# Patient Record
Sex: FEMALE | Race: WHITE | NOT HISPANIC OR LATINO
[De-identification: names, ages, dates, MRNs, and addresses within clinical notes are randomized per-mention and may not be internally consistent; named-entity substitution may affect disease eponyms.]

---

## 2017-04-22 ENCOUNTER — APPOINTMENT (OUTPATIENT)
Dept: FAMILY MEDICINE | Facility: CLINIC | Age: 57
End: 2017-04-22

## 2017-04-22 VITALS
TEMPERATURE: 97.8 F | HEART RATE: 80 BPM | HEIGHT: 67 IN | SYSTOLIC BLOOD PRESSURE: 120 MMHG | WEIGHT: 139 LBS | OXYGEN SATURATION: 98 % | BODY MASS INDEX: 21.82 KG/M2 | DIASTOLIC BLOOD PRESSURE: 70 MMHG | RESPIRATION RATE: 14 BRPM

## 2017-04-23 ENCOUNTER — FORM ENCOUNTER (OUTPATIENT)
Age: 57
End: 2017-04-23

## 2017-04-24 ENCOUNTER — APPOINTMENT (OUTPATIENT)
Dept: ULTRASOUND IMAGING | Facility: CLINIC | Age: 57
End: 2017-04-24

## 2017-04-24 ENCOUNTER — OUTPATIENT (OUTPATIENT)
Dept: OUTPATIENT SERVICES | Facility: HOSPITAL | Age: 57
LOS: 1 days | End: 2017-04-24
Payer: COMMERCIAL

## 2017-04-24 DIAGNOSIS — Z00.8 ENCOUNTER FOR OTHER GENERAL EXAMINATION: ICD-10-CM

## 2017-04-24 PROCEDURE — 76700 US EXAM ABDOM COMPLETE: CPT

## 2017-04-24 PROCEDURE — 76856 US EXAM PELVIC COMPLETE: CPT

## 2017-04-25 LAB
ALBUMIN SERPL ELPH-MCNC: 4.6 G/DL
ALP BLD-CCNC: 60 U/L
ALT SERPL-CCNC: 11 U/L
ANION GAP SERPL CALC-SCNC: 12 MMOL/L
APPEARANCE: CLEAR
AST SERPL-CCNC: 14 U/L
BASOPHILS # BLD AUTO: 0.04 K/UL
BASOPHILS NFR BLD AUTO: 0.6 %
BILIRUB SERPL-MCNC: 0.3 MG/DL
BILIRUBIN URINE: NEGATIVE
BLOOD URINE: NEGATIVE
BUN SERPL-MCNC: 16 MG/DL
CALCIUM SERPL-MCNC: 9.9 MG/DL
CHLORIDE SERPL-SCNC: 104 MMOL/L
CO2 SERPL-SCNC: 24 MMOL/L
COLOR: YELLOW
CREAT SERPL-MCNC: 0.7 MG/DL
EOSINOPHIL # BLD AUTO: 0.07 K/UL
EOSINOPHIL NFR BLD AUTO: 1 %
GLUCOSE QUALITATIVE U: NORMAL MG/DL
GLUCOSE SERPL-MCNC: 98 MG/DL
HBA1C MFR BLD HPLC: 5.3 %
HCT VFR BLD CALC: 42 %
HGB BLD-MCNC: 13.5 G/DL
IMM GRANULOCYTES NFR BLD AUTO: 0.3 %
KETONES URINE: NEGATIVE
LEUKOCYTE ESTERASE URINE: NEGATIVE
LYMPHOCYTES # BLD AUTO: 1.75 K/UL
LYMPHOCYTES NFR BLD AUTO: 25.2 %
MAN DIFF?: NORMAL
MCHC RBC-ENTMCNC: 30.3 PG
MCHC RBC-ENTMCNC: 32.1 GM/DL
MCV RBC AUTO: 94.4 FL
MONOCYTES # BLD AUTO: 0.51 K/UL
MONOCYTES NFR BLD AUTO: 7.3 %
NEUTROPHILS # BLD AUTO: 4.56 K/UL
NEUTROPHILS NFR BLD AUTO: 65.6 %
NITRITE URINE: NEGATIVE
PH URINE: 7
PLATELET # BLD AUTO: 446 K/UL
POTASSIUM SERPL-SCNC: 4.9 MMOL/L
PROT SERPL-MCNC: 7 G/DL
PROTEIN URINE: NEGATIVE MG/DL
RBC # BLD: 4.45 M/UL
RBC # FLD: 13.5 %
SODIUM SERPL-SCNC: 140 MMOL/L
SPECIFIC GRAVITY URINE: 1
TSH SERPL-ACNC: 1.16 UIU/ML
UROBILINOGEN URINE: NORMAL MG/DL
WBC # FLD AUTO: 6.95 K/UL

## 2017-05-15 ENCOUNTER — RX RENEWAL (OUTPATIENT)
Age: 57
End: 2017-05-15

## 2017-05-16 ENCOUNTER — TRANSCRIPTION ENCOUNTER (OUTPATIENT)
Age: 57
End: 2017-05-16

## 2017-08-02 ENCOUNTER — APPOINTMENT (OUTPATIENT)
Dept: FAMILY MEDICINE | Facility: CLINIC | Age: 57
End: 2017-08-02
Payer: COMMERCIAL

## 2017-08-02 VITALS
SYSTOLIC BLOOD PRESSURE: 102 MMHG | DIASTOLIC BLOOD PRESSURE: 64 MMHG | HEIGHT: 67 IN | BODY MASS INDEX: 23.78 KG/M2 | RESPIRATION RATE: 12 BRPM | WEIGHT: 151.5 LBS | HEART RATE: 73 BPM | OXYGEN SATURATION: 98 %

## 2017-08-02 PROCEDURE — 36415 COLL VENOUS BLD VENIPUNCTURE: CPT

## 2017-08-02 PROCEDURE — 99214 OFFICE O/P EST MOD 30 MIN: CPT | Mod: 25

## 2017-08-03 LAB
ALBUMIN SERPL ELPH-MCNC: 4.4 G/DL
ANION GAP SERPL CALC-SCNC: 17 MMOL/L
BASOPHILS # BLD AUTO: 0.05 K/UL
BASOPHILS NFR BLD AUTO: 0.6 %
BUN SERPL-MCNC: 17 MG/DL
CALCIUM SERPL-MCNC: 9.8 MG/DL
CHLORIDE SERPL-SCNC: 103 MMOL/L
CO2 SERPL-SCNC: 21 MMOL/L
CREAT SERPL-MCNC: 0.64 MG/DL
EOSINOPHIL # BLD AUTO: 0.08 K/UL
EOSINOPHIL NFR BLD AUTO: 1 %
GLUCOSE SERPL-MCNC: 79 MG/DL
HBA1C MFR BLD HPLC: 5.4 %
HCT VFR BLD CALC: 43 %
HGB BLD-MCNC: 14.2 G/DL
IMM GRANULOCYTES NFR BLD AUTO: 0.3 %
LYMPHOCYTES # BLD AUTO: 2.21 K/UL
LYMPHOCYTES NFR BLD AUTO: 28.5 %
MAN DIFF?: NORMAL
MCHC RBC-ENTMCNC: 31.3 PG
MCHC RBC-ENTMCNC: 33 GM/DL
MCV RBC AUTO: 94.7 FL
MONOCYTES # BLD AUTO: 0.58 K/UL
MONOCYTES NFR BLD AUTO: 7.5 %
NEUTROPHILS # BLD AUTO: 4.81 K/UL
NEUTROPHILS NFR BLD AUTO: 62.1 %
PHOSPHATE SERPL-MCNC: 3.1 MG/DL
PLATELET # BLD AUTO: 429 K/UL
POTASSIUM SERPL-SCNC: 4.7 MMOL/L
RBC # BLD: 4.54 M/UL
RBC # FLD: 13.3 %
SODIUM SERPL-SCNC: 141 MMOL/L
WBC # FLD AUTO: 7.75 K/UL

## 2017-08-04 ENCOUNTER — TRANSCRIPTION ENCOUNTER (OUTPATIENT)
Age: 57
End: 2017-08-04

## 2017-08-31 ENCOUNTER — APPOINTMENT (OUTPATIENT)
Dept: FAMILY MEDICINE | Facility: CLINIC | Age: 57
End: 2017-08-31
Payer: COMMERCIAL

## 2017-08-31 VITALS
DIASTOLIC BLOOD PRESSURE: 76 MMHG | WEIGHT: 155.38 LBS | SYSTOLIC BLOOD PRESSURE: 120 MMHG | BODY MASS INDEX: 24.39 KG/M2 | HEIGHT: 67 IN

## 2017-08-31 PROCEDURE — 11056 PARNG/CUTG B9 HYPRKR LES 2-4: CPT

## 2017-08-31 PROCEDURE — 99213 OFFICE O/P EST LOW 20 MIN: CPT | Mod: 25

## 2017-08-31 RX ORDER — KETOCONAZOLE 20 MG/G
2 CREAM TOPICAL
Qty: 15 | Refills: 0 | Status: DISCONTINUED | COMMUNITY
Start: 2016-10-04 | End: 2017-08-31

## 2017-08-31 RX ORDER — UREA 40 G/100G
40 CREAM TOPICAL
Qty: 85 | Refills: 0 | Status: DISCONTINUED | COMMUNITY
Start: 2017-03-09 | End: 2017-08-31

## 2017-09-07 ENCOUNTER — APPOINTMENT (OUTPATIENT)
Dept: FAMILY MEDICINE | Facility: CLINIC | Age: 57
End: 2017-09-07
Payer: COMMERCIAL

## 2017-09-07 ENCOUNTER — LABORATORY RESULT (OUTPATIENT)
Age: 57
End: 2017-09-07

## 2017-09-07 VITALS
WEIGHT: 156 LBS | BODY MASS INDEX: 24.48 KG/M2 | SYSTOLIC BLOOD PRESSURE: 122 MMHG | HEIGHT: 67 IN | DIASTOLIC BLOOD PRESSURE: 70 MMHG

## 2017-09-07 PROCEDURE — 11042 DBRDMT SUBQ TIS 1ST 20SQCM/<: CPT

## 2017-09-07 PROCEDURE — 99213 OFFICE O/P EST LOW 20 MIN: CPT | Mod: 25

## 2017-09-12 LAB — CORE LAB BIOPSY: NORMAL

## 2017-09-21 ENCOUNTER — APPOINTMENT (OUTPATIENT)
Dept: FAMILY MEDICINE | Facility: CLINIC | Age: 57
End: 2017-09-21
Payer: COMMERCIAL

## 2017-09-21 VITALS
WEIGHT: 155.38 LBS | HEART RATE: 107 BPM | SYSTOLIC BLOOD PRESSURE: 128 MMHG | HEIGHT: 67 IN | BODY MASS INDEX: 24.39 KG/M2 | OXYGEN SATURATION: 99 % | DIASTOLIC BLOOD PRESSURE: 80 MMHG

## 2017-09-21 PROCEDURE — 99213 OFFICE O/P EST LOW 20 MIN: CPT | Mod: 25

## 2017-09-21 PROCEDURE — 17110 DESTRUCTION B9 LES UP TO 14: CPT

## 2017-10-12 ENCOUNTER — APPOINTMENT (OUTPATIENT)
Dept: FAMILY MEDICINE | Facility: CLINIC | Age: 57
End: 2017-10-12

## 2018-01-04 ENCOUNTER — APPOINTMENT (OUTPATIENT)
Dept: FAMILY MEDICINE | Facility: CLINIC | Age: 58
End: 2018-01-04

## 2018-01-24 ENCOUNTER — APPOINTMENT (OUTPATIENT)
Dept: FAMILY MEDICINE | Facility: CLINIC | Age: 58
End: 2018-01-24
Payer: COMMERCIAL

## 2018-01-24 VITALS
HEART RATE: 80 BPM | BODY MASS INDEX: 24.72 KG/M2 | DIASTOLIC BLOOD PRESSURE: 80 MMHG | HEIGHT: 67 IN | TEMPERATURE: 97.9 F | OXYGEN SATURATION: 98 % | WEIGHT: 157.5 LBS | SYSTOLIC BLOOD PRESSURE: 122 MMHG

## 2018-01-24 PROCEDURE — 90688 IIV4 VACCINE SPLT 0.5 ML IM: CPT

## 2018-01-24 PROCEDURE — G0008: CPT

## 2018-01-24 PROCEDURE — 99213 OFFICE O/P EST LOW 20 MIN: CPT | Mod: 25

## 2018-01-24 RX ORDER — HYDROCORTISONE 25 MG/G
2.5 CREAM TOPICAL DAILY
Qty: 1 | Refills: 3 | Status: DISCONTINUED | COMMUNITY
Start: 2017-08-02 | End: 2018-01-24

## 2018-05-23 ENCOUNTER — APPOINTMENT (OUTPATIENT)
Dept: FAMILY MEDICINE | Facility: CLINIC | Age: 58
End: 2018-05-23
Payer: COMMERCIAL

## 2018-05-23 VITALS
HEIGHT: 67 IN | DIASTOLIC BLOOD PRESSURE: 82 MMHG | WEIGHT: 159.38 LBS | SYSTOLIC BLOOD PRESSURE: 124 MMHG | BODY MASS INDEX: 25.01 KG/M2

## 2018-05-23 PROCEDURE — 99214 OFFICE O/P EST MOD 30 MIN: CPT

## 2018-05-23 NOTE — HISTORY OF PRESENT ILLNESS
[FreeTextEntry8] : Patient diagnosed with nontoxic multinodular goiter by Dr. Valdez on the longer accepts her insurance will order a followup thyroid ultrasound.\par \par Recent mammogram and ultrasound show multiple breast cysts. Patient requests consultation with breast surgeon. Her current surgeon. No longer accepts her insurance \par \par Hot flashes continue in spite of Effexor\par \par Occasional right upper quadrant pain persists for least one year unchanged. No weight loss

## 2018-05-23 NOTE — PHYSICAL EXAM
[No Acute Distress] : no acute distress [Clear to Auscultation] : lungs were clear to auscultation bilaterally [Regular Rhythm] : with a regular rhythm [No Carotid Bruits] : no carotid bruits [Soft] : abdomen soft [No HSM] : no HSM

## 2018-05-23 NOTE — ASSESSMENT
[FreeTextEntry1] : Patient remained stable. Appropriate referrals made see me for a complete physical in the fall

## 2018-06-27 ENCOUNTER — NON-APPOINTMENT (OUTPATIENT)
Age: 58
End: 2018-06-27

## 2018-06-27 ENCOUNTER — APPOINTMENT (OUTPATIENT)
Dept: FAMILY MEDICINE | Facility: CLINIC | Age: 58
End: 2018-06-27
Payer: COMMERCIAL

## 2018-06-27 VITALS
WEIGHT: 154.38 LBS | OXYGEN SATURATION: 99 % | HEIGHT: 67 IN | HEART RATE: 90 BPM | DIASTOLIC BLOOD PRESSURE: 80 MMHG | SYSTOLIC BLOOD PRESSURE: 132 MMHG | BODY MASS INDEX: 24.23 KG/M2

## 2018-06-27 PROCEDURE — 93000 ELECTROCARDIOGRAM COMPLETE: CPT

## 2018-06-27 PROCEDURE — 99214 OFFICE O/P EST MOD 30 MIN: CPT | Mod: 25

## 2018-06-27 RX ORDER — VENLAFAXINE HYDROCHLORIDE 37.5 MG/1
37.5 CAPSULE, EXTENDED RELEASE ORAL
Qty: 30 | Refills: 3 | Status: DISCONTINUED | COMMUNITY
Start: 2017-08-02 | End: 2018-06-27

## 2018-06-27 RX ORDER — AMMONIUM LACTATE 12 %
12 CREAM (GRAM) TOPICAL TWICE DAILY
Qty: 1 | Refills: 3 | Status: DISCONTINUED | COMMUNITY
Start: 2017-09-07 | End: 2018-06-27

## 2018-06-27 NOTE — ASSESSMENT
[FreeTextEntry1] : [place] Ricardo type shingles. Treat with prednisone and famciclovir. Recheck 48 hours. Patient insisted on EKG to rule out angina

## 2018-06-27 NOTE — HISTORY OF PRESENT ILLNESS
[FreeTextEntry8] : 3 days of left-sided occipital pain radiating to the side of head left forehead and left malar area. Erythematous rash, left forehead, stopped at the midline. The pain is constant. Patient taking gabapentin for vulvodynia. No rash tip of nose left eye completely normal. Not painful

## 2018-06-29 ENCOUNTER — APPOINTMENT (OUTPATIENT)
Dept: FAMILY MEDICINE | Facility: CLINIC | Age: 58
End: 2018-06-29
Payer: COMMERCIAL

## 2018-06-29 VITALS
HEIGHT: 67 IN | OXYGEN SATURATION: 99 % | HEART RATE: 121 BPM | BODY MASS INDEX: 24.09 KG/M2 | TEMPERATURE: 98.3 F | DIASTOLIC BLOOD PRESSURE: 80 MMHG | SYSTOLIC BLOOD PRESSURE: 124 MMHG | WEIGHT: 153.5 LBS

## 2018-06-29 PROCEDURE — 99213 OFFICE O/P EST LOW 20 MIN: CPT | Mod: 25

## 2018-06-29 PROCEDURE — 17110 DESTRUCTION B9 LES UP TO 14: CPT

## 2018-06-29 NOTE — HISTORY OF PRESENT ILLNESS
[de-identified] : Classic shingles rash, left T1 distribution tip of nose is spared. Orbital erythema cornea normal. Some photophobia, but no eye pain, currently on Famvir and prednisone. Pain is minimal controlled with gabapentin

## 2018-06-29 NOTE — PLAN
[FreeTextEntry1] : Continue current meds patient request. Ophthalmologic: Evaluation referred to her ophthalmologist will be seen tomorrow.\par \par Wart left shoulder Approximately 5 mm x 3 mm. Cryotherapy x15 seconds good freeze ball. Wound care discussed

## 2018-07-19 ENCOUNTER — APPOINTMENT (OUTPATIENT)
Dept: SURGERY | Facility: CLINIC | Age: 58
End: 2018-07-19

## 2018-07-26 ENCOUNTER — APPOINTMENT (OUTPATIENT)
Dept: FAMILY MEDICINE | Facility: CLINIC | Age: 58
End: 2018-07-26
Payer: COMMERCIAL

## 2018-07-26 VITALS
TEMPERATURE: 97.5 F | DIASTOLIC BLOOD PRESSURE: 80 MMHG | BODY MASS INDEX: 24.33 KG/M2 | HEART RATE: 87 BPM | RESPIRATION RATE: 16 BRPM | OXYGEN SATURATION: 98 % | WEIGHT: 155 LBS | HEIGHT: 67 IN | SYSTOLIC BLOOD PRESSURE: 110 MMHG

## 2018-07-26 PROCEDURE — 99213 OFFICE O/P EST LOW 20 MIN: CPT

## 2018-07-26 NOTE — PHYSICAL EXAM
[No Acute Distress] : no acute distress [Normal Sclera/Conjunctiva] : normal sclera/conjunctiva [de-identified] : Faint morbilliform rash in left T1 distribution

## 2018-07-26 NOTE — HISTORY OF PRESENT ILLNESS
[de-identified] : Postherpetic neuralgia. Shingles rash has resolved about 90% and T1 distribution. Patient has occasional pain occasional itching has photophobia. Has seen the ophthalmologist twice. Examination of left eye and cornea. Today is within normal limits. Patient taking gabapentin 400 mg 3 times a day with some drowsiness. Refuses  Lyrica , which has had significant side effects in the past

## 2018-07-26 NOTE — ASSESSMENT
[FreeTextEntry1] : Postherpetic neuralgia. Will increase dose of gabapentin to 600 mg q.i.d. and lidocaine. Topical cream use of eye patch . Followup in one month or p.r.n.

## 2018-09-24 ENCOUNTER — RX RENEWAL (OUTPATIENT)
Age: 58
End: 2018-09-24

## 2018-10-17 ENCOUNTER — APPOINTMENT (OUTPATIENT)
Dept: FAMILY MEDICINE | Facility: CLINIC | Age: 58
End: 2018-10-17
Payer: COMMERCIAL

## 2018-10-17 VITALS
HEIGHT: 67 IN | OXYGEN SATURATION: 98 % | DIASTOLIC BLOOD PRESSURE: 70 MMHG | BODY MASS INDEX: 24.09 KG/M2 | TEMPERATURE: 98.4 F | SYSTOLIC BLOOD PRESSURE: 100 MMHG | WEIGHT: 153.5 LBS | HEART RATE: 96 BPM

## 2018-10-17 PROCEDURE — 99213 OFFICE O/P EST LOW 20 MIN: CPT

## 2018-10-17 RX ORDER — FAMCICLOVIR 500 MG/1
500 TABLET, FILM COATED ORAL 3 TIMES DAILY
Qty: 15 | Refills: 0 | Status: DISCONTINUED | COMMUNITY
Start: 2018-06-27 | End: 2018-10-17

## 2018-10-17 RX ORDER — PREDNISONE 20 MG/1
20 TABLET ORAL
Qty: 5 | Refills: 0 | Status: DISCONTINUED | COMMUNITY
Start: 2018-06-27 | End: 2018-10-17

## 2018-10-17 NOTE — PHYSICAL EXAM
[No Acute Distress] : no acute distress [Normal Oropharynx] : the oropharynx was normal [No Lymphadenopathy] : no lymphadenopathy [Clear to Auscultation] : lungs were clear to auscultation bilaterally [Regular Rhythm] : with a regular rhythm

## 2018-10-17 NOTE — REVIEW OF SYSTEMS
[Shortness Of Breath] : no shortness of breath [Wheezing] : no wheezing [Cough] : cough [Dyspnea on Exertion] : no dyspnea on exertion [Negative] : Gastrointestinal [FreeTextEntry8] : see H&P

## 2018-10-17 NOTE — HISTORY OF PRESENT ILLNESS
[FreeTextEntry8] : Dry cough for 2 weeks of shortness of breath. No fever. Cough is mild and does not awaken from sleep.\par \par Abnormal vaginal bleeding. Has appointment with gynecologist tomorrow.\par \par Postherpetic neuralgia, left V1 is basically resolved

## 2018-10-18 ENCOUNTER — APPOINTMENT (OUTPATIENT)
Dept: UROLOGY | Facility: CLINIC | Age: 58
End: 2018-10-18
Payer: COMMERCIAL

## 2018-10-18 VITALS
WEIGHT: 152 LBS | OXYGEN SATURATION: 99 % | HEIGHT: 67 IN | DIASTOLIC BLOOD PRESSURE: 82 MMHG | BODY MASS INDEX: 23.86 KG/M2 | TEMPERATURE: 97.2 F | HEART RATE: 112 BPM | SYSTOLIC BLOOD PRESSURE: 142 MMHG

## 2018-10-18 LAB
BILIRUB UR QL STRIP: NORMAL
CLARITY UR: CLEAR
COLLECTION METHOD: NORMAL
GLUCOSE UR-MCNC: NORMAL
HCG UR QL: 0.2 EU/DL
HGB UR QL STRIP.AUTO: NORMAL
KETONES UR-MCNC: 15
LEUKOCYTE ESTERASE UR QL STRIP: NORMAL
NITRITE UR QL STRIP: NORMAL
PH UR STRIP: 7
PROT UR STRIP-MCNC: NORMAL
SP GR UR STRIP: 1.02

## 2018-10-18 PROCEDURE — 51798 US URINE CAPACITY MEASURE: CPT

## 2018-10-18 PROCEDURE — 99205 OFFICE O/P NEW HI 60 MIN: CPT | Mod: 25

## 2018-10-18 PROCEDURE — 81003 URINALYSIS AUTO W/O SCOPE: CPT | Mod: QW

## 2018-10-24 LAB
APPEARANCE: CLEAR
BACTERIA UR CULT: NORMAL
BACTERIA: NEGATIVE
BILIRUBIN URINE: NEGATIVE
BLOOD URINE: NEGATIVE
COLOR: YELLOW
CORE LAB FLUID CYTOLOGY: NORMAL
GLUCOSE QUALITATIVE U: NEGATIVE MG/DL
KETONES URINE: ABNORMAL
LEUKOCYTE ESTERASE URINE: NEGATIVE
MICROSCOPIC-UA: NORMAL
NITRITE URINE: NEGATIVE
PH URINE: 7.5
PROTEIN URINE: NEGATIVE MG/DL
RED BLOOD CELLS URINE: 1 /HPF
SPECIFIC GRAVITY URINE: 1.01
SQUAMOUS EPITHELIAL CELLS: 1 /HPF
UROBILINOGEN URINE: NEGATIVE MG/DL
WHITE BLOOD CELLS URINE: 1 /HPF

## 2018-11-05 ENCOUNTER — APPOINTMENT (OUTPATIENT)
Dept: ULTRASOUND IMAGING | Facility: CLINIC | Age: 58
End: 2018-11-05
Payer: COMMERCIAL

## 2018-11-07 ENCOUNTER — FORM ENCOUNTER (OUTPATIENT)
Age: 58
End: 2018-11-07

## 2018-11-08 ENCOUNTER — OUTPATIENT (OUTPATIENT)
Dept: OUTPATIENT SERVICES | Facility: HOSPITAL | Age: 58
LOS: 1 days | End: 2018-11-08
Payer: COMMERCIAL

## 2018-11-08 ENCOUNTER — APPOINTMENT (OUTPATIENT)
Dept: ULTRASOUND IMAGING | Facility: CLINIC | Age: 58
End: 2018-11-08
Payer: COMMERCIAL

## 2018-11-08 DIAGNOSIS — N93.9 ABNORMAL UTERINE AND VAGINAL BLEEDING, UNSPECIFIED: ICD-10-CM

## 2018-11-08 DIAGNOSIS — Z00.8 ENCOUNTER FOR OTHER GENERAL EXAMINATION: ICD-10-CM

## 2018-11-08 DIAGNOSIS — N94.819 VULVODYNIA, UNSPECIFIED: ICD-10-CM

## 2018-11-08 PROCEDURE — 76856 US EXAM PELVIC COMPLETE: CPT

## 2018-11-08 PROCEDURE — 76856 US EXAM PELVIC COMPLETE: CPT | Mod: 26

## 2019-03-26 ENCOUNTER — FORM ENCOUNTER (OUTPATIENT)
Age: 59
End: 2019-03-26

## 2019-03-27 ENCOUNTER — APPOINTMENT (OUTPATIENT)
Dept: ULTRASOUND IMAGING | Facility: CLINIC | Age: 59
End: 2019-03-27
Payer: COMMERCIAL

## 2019-03-27 ENCOUNTER — OUTPATIENT (OUTPATIENT)
Dept: OUTPATIENT SERVICES | Facility: HOSPITAL | Age: 59
LOS: 1 days | End: 2019-03-27
Payer: COMMERCIAL

## 2019-03-27 DIAGNOSIS — Z00.8 ENCOUNTER FOR OTHER GENERAL EXAMINATION: ICD-10-CM

## 2019-03-27 DIAGNOSIS — E04.9 NONTOXIC GOITER, UNSPECIFIED: ICD-10-CM

## 2019-03-27 PROCEDURE — 76536 US EXAM OF HEAD AND NECK: CPT

## 2019-03-27 PROCEDURE — 76536 US EXAM OF HEAD AND NECK: CPT | Mod: 26

## 2019-04-25 ENCOUNTER — APPOINTMENT (OUTPATIENT)
Dept: FAMILY MEDICINE | Facility: CLINIC | Age: 59
End: 2019-04-25
Payer: COMMERCIAL

## 2019-04-25 VITALS
BODY MASS INDEX: 23.86 KG/M2 | HEART RATE: 92 BPM | HEIGHT: 67 IN | DIASTOLIC BLOOD PRESSURE: 80 MMHG | RESPIRATION RATE: 16 BRPM | SYSTOLIC BLOOD PRESSURE: 112 MMHG | OXYGEN SATURATION: 98 % | WEIGHT: 152 LBS

## 2019-04-25 PROCEDURE — 99214 OFFICE O/P EST MOD 30 MIN: CPT

## 2019-04-25 RX ORDER — LIDOCAINE AND PRILOCAINE 25; 25 MG/G; MG/G
2.5-2.5 CREAM TOPICAL
Qty: 2 | Refills: 3 | Status: DISCONTINUED | COMMUNITY
Start: 2018-07-26 | End: 2019-04-25

## 2019-04-25 RX ORDER — ASCORBIC ACID 500 MG
TABLET ORAL
Refills: 0 | Status: COMPLETED | COMMUNITY

## 2019-04-25 RX ORDER — CYCLOBENZAPRINE HYDROCHLORIDE 10 MG/1
10 TABLET, FILM COATED ORAL 3 TIMES DAILY
Qty: 90 | Refills: 2 | Status: DISCONTINUED | COMMUNITY
Start: 2018-10-18 | End: 2019-04-25

## 2019-04-25 RX ORDER — UBIDECARENONE/VIT E ACET 100MG-5
CAPSULE ORAL
Refills: 0 | Status: COMPLETED | COMMUNITY

## 2019-04-25 RX ORDER — MONTELUKAST 10 MG/1
10 TABLET, FILM COATED ORAL
Qty: 10 | Refills: 3 | Status: COMPLETED | COMMUNITY
Start: 2018-11-12 | End: 2019-04-25

## 2019-04-25 NOTE — HISTORY OF PRESENT ILLNESS
[de-identified] : Complains of dyspnea on exertion. No wheezing. No cough. No cardiac history. Occurs intermittently.\par \par Has been evaluated for vulvodynia again did not complete workup. Was told. Urinalysis was contaminated however, urinalysis is normal. She complains of frequent urination\par \par Postherpetic neuralgia has resolved

## 2019-04-25 NOTE — ASSESSMENT
[FreeTextEntry1] : Dyspnea on exertion, referred to cardiology.\par \par Nontoxic goiter, thyroid nonpalpable.\par \par Resolved. Trigeminal neuralgia.\par \par Chronic vulvodynia\par \par Check labs

## 2019-04-25 NOTE — PHYSICAL EXAM
[No Acute Distress] : no acute distress [Thyroid Normal, No Nodules] : the thyroid was normal and there were no nodules present [No Lymphadenopathy] : no lymphadenopathy [Clear to Auscultation] : lungs were clear to auscultation bilaterally [Regular Rhythm] : with a regular rhythm [No Murmur] : no murmur heard [No Edema] : there was no peripheral edema

## 2019-04-26 ENCOUNTER — TRANSCRIPTION ENCOUNTER (OUTPATIENT)
Age: 59
End: 2019-04-26

## 2019-04-26 LAB
25(OH)D3 SERPL-MCNC: 26.8 NG/ML
ALBUMIN SERPL ELPH-MCNC: 4.5 G/DL
ALP BLD-CCNC: 70 U/L
ALT SERPL-CCNC: 35 U/L
ANION GAP SERPL CALC-SCNC: 11 MMOL/L
APPEARANCE: CLEAR
AST SERPL-CCNC: 25 U/L
BASOPHILS # BLD AUTO: 0.06 K/UL
BASOPHILS NFR BLD AUTO: 0.6 %
BILIRUB SERPL-MCNC: 0.5 MG/DL
BILIRUBIN URINE: NEGATIVE
BLOOD URINE: NEGATIVE
BUN SERPL-MCNC: 16 MG/DL
CALCIUM SERPL-MCNC: 9.5 MG/DL
CHLORIDE SERPL-SCNC: 102 MMOL/L
CHOLEST SERPL-MCNC: 164 MG/DL
CHOLEST/HDLC SERPL: 2.7 RATIO
CO2 SERPL-SCNC: 25 MMOL/L
COLOR: NORMAL
CREAT SERPL-MCNC: 0.66 MG/DL
EOSINOPHIL # BLD AUTO: 0.05 K/UL
EOSINOPHIL NFR BLD AUTO: 0.5 %
ESTIMATED AVERAGE GLUCOSE: 103 MG/DL
GLUCOSE QUALITATIVE U: NEGATIVE
GLUCOSE SERPL-MCNC: 93 MG/DL
HBA1C MFR BLD HPLC: 5.2 %
HCT VFR BLD CALC: 44.8 %
HDLC SERPL-MCNC: 61 MG/DL
HGB BLD-MCNC: 14.4 G/DL
IMM GRANULOCYTES NFR BLD AUTO: 0.3 %
KETONES URINE: NORMAL
LDLC SERPL CALC-MCNC: 95 MG/DL
LEUKOCYTE ESTERASE URINE: NEGATIVE
LYMPHOCYTES # BLD AUTO: 2.17 K/UL
LYMPHOCYTES NFR BLD AUTO: 22.2 %
MAN DIFF?: NORMAL
MCHC RBC-ENTMCNC: 30.4 PG
MCHC RBC-ENTMCNC: 32.1 GM/DL
MCV RBC AUTO: 94.7 FL
MONOCYTES # BLD AUTO: 0.56 K/UL
MONOCYTES NFR BLD AUTO: 5.7 %
NEUTROPHILS # BLD AUTO: 6.91 K/UL
NEUTROPHILS NFR BLD AUTO: 70.7 %
NITRITE URINE: NEGATIVE
PH URINE: 5.5
PLATELET # BLD AUTO: 442 K/UL
POTASSIUM SERPL-SCNC: 4.2 MMOL/L
PROT SERPL-MCNC: 6.8 G/DL
PROTEIN URINE: NEGATIVE
RBC # BLD: 4.73 M/UL
RBC # FLD: 13 %
SODIUM SERPL-SCNC: 138 MMOL/L
SPECIFIC GRAVITY URINE: 1.02
TRIGL SERPL-MCNC: 41 MG/DL
TSH SERPL-ACNC: 2.47 UIU/ML
UROBILINOGEN URINE: NORMAL
WBC # FLD AUTO: 9.78 K/UL

## 2019-07-18 ENCOUNTER — APPOINTMENT (OUTPATIENT)
Dept: SURGERY | Facility: CLINIC | Age: 59
End: 2019-07-18
Payer: COMMERCIAL

## 2019-07-18 VITALS
WEIGHT: 160 LBS | HEIGHT: 67 IN | BODY MASS INDEX: 25.11 KG/M2 | RESPIRATION RATE: 16 BRPM | DIASTOLIC BLOOD PRESSURE: 80 MMHG | SYSTOLIC BLOOD PRESSURE: 131 MMHG | HEART RATE: 80 BPM | OXYGEN SATURATION: 97 %

## 2019-07-18 PROCEDURE — 99204 OFFICE O/P NEW MOD 45 MIN: CPT

## 2019-07-18 NOTE — HISTORY OF PRESENT ILLNESS
[FreeTextEntry1] : I had the pleasure of seeing STARLA HADDAD  in the office today for a new breast evaluation secondary to imaging demonstrating cyst vs nodules with recommendation of short term follow up imaging.\par \par She reports bilateral breast pain ( L>R).  She saw Dr. Jarrell (cardiologist) and underwent echocardiogram and states the test came back within normal limits.\par \par She denies dominant breast mass, skin changes or nipple discharge. She denies headaches, blurry vision, chest pain, SOB, abdominal pain, joint aches or difficult walking.\par \par G0.  Menses began at age 15.\par She denies personal history of malignancy.\par Family history is significant for maternal great aunt diagnosed with breast cancer at age 40, maternal cousin diagnosed with breast cancer at age 29, maternal cousin diagnosed with cancer at age 35 and maternal grandmother diagnosed with breast cancer at age 80.  Maternal grandmother diagnosed with colon cancer at age 88.\par \par Imaging:  [facility]\par 5/16/2019  MAMMO SCREENING ROGERIO BILAT\par Impression:  No mammographic evidence of malignancy.  Recommend routine screening.  BIRADS 2 benign\par \par 5/16/2019  BREAST ULTRASOUND BILATERAL COMPLETE\par Impression:  Probable benign findings bilaterally, right breast 8:00, left 5:00 and 5:30, as well as 11:00, for which short term interval follow up bilateral ultrasound is recommended in 6 months time.  BIRADS 3 Probably benign.\par \par We reviewed and discussed clinical breast exam and recent imaging.  She understands her clinical breast exam today is benign.  Imaging demonstrated multiple bilateral cysts and cysts vs nodules.  The three cyst vs nodules are probably benign but recommendation is for short term follow up bilateral breast ultrasound due in November 2019.  Script was provided and she is to return in 6 months if imaging is stable and benign.  She understands and agrees to plan.  All questions answered.

## 2019-07-18 NOTE — PHYSICAL EXAM

## 2019-07-18 NOTE — ASSESSMENT
[FreeTextEntry1] : 60 yo female with fibrocystic breast disease presents with BIRADS 3 ultrasound.  Ultrasound demonstrated bilateral breast cyst vs nodules and recommendation is for short term follow up ultrasound in November.  Internal review will also be requested.  We also discussed genetic testing due to strong family history of breast cancer and at this time she would like more time to think about it.  We also discussed lifestyle modifications for mastodynia.  Recommendation for follow up in 6 months as long as imaging is stable and benign. \par 1.  Lifestyle modifications for mastodynia\par 2.  Bilateral breast ultrasound due November 2019 to follow up cyst vs nodules\par 3.  Screening mammogram/sonogram 5/2020\par 4.  Followup in 6 months

## 2019-12-20 ENCOUNTER — APPOINTMENT (OUTPATIENT)
Dept: FAMILY MEDICINE | Facility: CLINIC | Age: 59
End: 2019-12-20
Payer: COMMERCIAL

## 2019-12-20 VITALS — TEMPERATURE: 98 F

## 2019-12-20 VITALS
HEART RATE: 79 BPM | DIASTOLIC BLOOD PRESSURE: 62 MMHG | BODY MASS INDEX: 24.96 KG/M2 | OXYGEN SATURATION: 99 % | SYSTOLIC BLOOD PRESSURE: 110 MMHG | WEIGHT: 159 LBS | HEIGHT: 67 IN | RESPIRATION RATE: 16 BRPM

## 2019-12-20 PROCEDURE — 99213 OFFICE O/P EST LOW 20 MIN: CPT | Mod: 25

## 2019-12-20 PROCEDURE — 90686 IIV4 VACC NO PRSV 0.5 ML IM: CPT

## 2019-12-20 PROCEDURE — 17110 DESTRUCTION B9 LES UP TO 14: CPT

## 2019-12-20 PROCEDURE — G0008: CPT

## 2019-12-20 NOTE — HISTORY OF PRESENT ILLNESS
[FreeTextEntry8] : Irritation and occasional spotting of blood intertriginous area of the rostral [place] physical exam shows some erythema skin is intact\par \par Mild pruritus of the labium majora patient has some urinary incontinence recommend Desitin and over-the-counter hydrocortisone 10\par \par Seborrheic keratosis left cleavage alcohol wipe lido and epi cautery and curettage to clean base

## 2020-01-16 ENCOUNTER — APPOINTMENT (OUTPATIENT)
Dept: SURGERY | Facility: CLINIC | Age: 60
End: 2020-01-16

## 2020-01-31 ENCOUNTER — APPOINTMENT (OUTPATIENT)
Dept: FAMILY MEDICINE | Facility: CLINIC | Age: 60
End: 2020-01-31
Payer: COMMERCIAL

## 2020-01-31 VITALS
HEART RATE: 96 BPM | WEIGHT: 159 LBS | BODY MASS INDEX: 24.96 KG/M2 | OXYGEN SATURATION: 98 % | RESPIRATION RATE: 16 BRPM | DIASTOLIC BLOOD PRESSURE: 80 MMHG | SYSTOLIC BLOOD PRESSURE: 110 MMHG | HEIGHT: 67 IN

## 2020-01-31 LAB
BILIRUB UR QL STRIP: NEGATIVE
CLARITY UR: CLEAR
COLLECTION METHOD: NORMAL
GLUCOSE UR-MCNC: NEGATIVE
HCG UR QL: 0.2 EU/DL
HGB UR QL STRIP.AUTO: NEGATIVE
KETONES UR-MCNC: NEGATIVE
LEUKOCYTE ESTERASE UR QL STRIP: NORMAL
NITRITE UR QL STRIP: NEGATIVE
PH UR STRIP: 6
PROT UR STRIP-MCNC: NEGATIVE
SP GR UR STRIP: 1

## 2020-01-31 PROCEDURE — 81003 URINALYSIS AUTO W/O SCOPE: CPT | Mod: QW

## 2020-01-31 PROCEDURE — 99213 OFFICE O/P EST LOW 20 MIN: CPT | Mod: 25

## 2020-01-31 NOTE — PLAN
[FreeTextEntry1] : Probable vaginal atrophy patient has GYN appointment tomorrow reconsider estrogen topical

## 2020-01-31 NOTE — HISTORY OF PRESENT ILLNESS
[FreeTextEntry8] : 2 days of pink staining on toilet paper with vaginal irritation.  No urgency chronic dysuria.  Patient offered estrogen vaginal cream in the past she refused due to intolerance has appointment with GYN tomorrow\par \par Urinalysis trace leukocytes no blood

## 2020-02-02 LAB
APPEARANCE: CLEAR
BACTERIA UR CULT: NORMAL
BILIRUBIN URINE: NEGATIVE
BLOOD URINE: NEGATIVE
COLOR: COLORLESS
GLUCOSE QUALITATIVE U: NEGATIVE
KETONES URINE: NEGATIVE
LEUKOCYTE ESTERASE URINE: NEGATIVE
NITRITE URINE: NEGATIVE
PH URINE: 5.5
PROTEIN URINE: NEGATIVE
SPECIFIC GRAVITY URINE: 1
UROBILINOGEN URINE: NORMAL

## 2020-02-21 ENCOUNTER — APPOINTMENT (OUTPATIENT)
Dept: FAMILY MEDICINE | Facility: CLINIC | Age: 60
End: 2020-02-21
Payer: COMMERCIAL

## 2020-02-21 VITALS
SYSTOLIC BLOOD PRESSURE: 136 MMHG | DIASTOLIC BLOOD PRESSURE: 80 MMHG | OXYGEN SATURATION: 96 % | BODY MASS INDEX: 24.96 KG/M2 | HEIGHT: 67 IN | RESPIRATION RATE: 16 BRPM | WEIGHT: 159 LBS | HEART RATE: 100 BPM

## 2020-02-21 DIAGNOSIS — R19.5 OTHER FECAL ABNORMALITIES: ICD-10-CM

## 2020-02-21 DIAGNOSIS — N94.818 OTHER VULVODYNIA: ICD-10-CM

## 2020-02-21 DIAGNOSIS — R05 COUGH: ICD-10-CM

## 2020-02-21 DIAGNOSIS — Z92.29 PERSONAL HISTORY OF OTHER DRUG THERAPY: ICD-10-CM

## 2020-02-21 DIAGNOSIS — B07.0 PLANTAR WART: ICD-10-CM

## 2020-02-21 DIAGNOSIS — J06.9 ACUTE UPPER RESPIRATORY INFECTION, UNSPECIFIED: ICD-10-CM

## 2020-02-21 DIAGNOSIS — N92.0 EXCESSIVE AND FREQUENT MENSTRUATION WITH REGULAR CYCLE: ICD-10-CM

## 2020-02-21 DIAGNOSIS — Z87.898 PERSONAL HISTORY OF OTHER SPECIFIED CONDITIONS: ICD-10-CM

## 2020-02-21 PROCEDURE — 99214 OFFICE O/P EST MOD 30 MIN: CPT

## 2020-02-21 NOTE — ASSESSMENT
[FreeTextEntry1] : Possible endometrial cancer will obtain transabdominal uterine ultrasound.  If thickened stripe must follow-up with gynecologist.  If normal then MRI of pelvis ordered to evaluate right lower quadrant discomfort and cervix

## 2020-02-21 NOTE — HISTORY OF PRESENT ILLNESS
[de-identified] : 3 episodes of vaginal spotting in the past year.  Patient has seen 2 different gynecologists.  Will not allow internal exam due to dysparunia.  She is  0 para 0 has not had a Pap test in 15 years however has not been sexually active either continues to have some vague right lower quadrant discomfort

## 2020-02-24 ENCOUNTER — FORM ENCOUNTER (OUTPATIENT)
Age: 60
End: 2020-02-24

## 2020-02-25 ENCOUNTER — APPOINTMENT (OUTPATIENT)
Dept: ULTRASOUND IMAGING | Facility: CLINIC | Age: 60
End: 2020-02-25
Payer: COMMERCIAL

## 2020-02-25 ENCOUNTER — OUTPATIENT (OUTPATIENT)
Dept: OUTPATIENT SERVICES | Facility: HOSPITAL | Age: 60
LOS: 1 days | End: 2020-02-25
Payer: COMMERCIAL

## 2020-02-25 DIAGNOSIS — N93.9 ABNORMAL UTERINE AND VAGINAL BLEEDING, UNSPECIFIED: ICD-10-CM

## 2020-02-25 PROCEDURE — 76856 US EXAM PELVIC COMPLETE: CPT | Mod: 26

## 2020-02-25 PROCEDURE — 76856 US EXAM PELVIC COMPLETE: CPT

## 2020-02-27 ENCOUNTER — FORM ENCOUNTER (OUTPATIENT)
Age: 60
End: 2020-02-27

## 2020-02-28 ENCOUNTER — APPOINTMENT (OUTPATIENT)
Dept: ULTRASOUND IMAGING | Facility: CLINIC | Age: 60
End: 2020-02-28
Payer: COMMERCIAL

## 2020-02-28 ENCOUNTER — APPOINTMENT (OUTPATIENT)
Dept: FAMILY MEDICINE | Facility: CLINIC | Age: 60
End: 2020-02-28
Payer: COMMERCIAL

## 2020-02-28 ENCOUNTER — OUTPATIENT (OUTPATIENT)
Dept: OUTPATIENT SERVICES | Facility: HOSPITAL | Age: 60
LOS: 1 days | End: 2020-02-28
Payer: COMMERCIAL

## 2020-02-28 VITALS
OXYGEN SATURATION: 96 % | HEART RATE: 90 BPM | SYSTOLIC BLOOD PRESSURE: 130 MMHG | WEIGHT: 150.38 LBS | BODY MASS INDEX: 23.6 KG/M2 | DIASTOLIC BLOOD PRESSURE: 70 MMHG | HEIGHT: 67 IN

## 2020-02-28 DIAGNOSIS — Z00.8 ENCOUNTER FOR OTHER GENERAL EXAMINATION: ICD-10-CM

## 2020-02-28 PROCEDURE — 76705 ECHO EXAM OF ABDOMEN: CPT

## 2020-02-28 PROCEDURE — 76705 ECHO EXAM OF ABDOMEN: CPT | Mod: 26

## 2020-02-28 PROCEDURE — 99214 OFFICE O/P EST MOD 30 MIN: CPT

## 2020-02-28 NOTE — HISTORY OF PRESENT ILLNESS
[FreeTextEntry8] : 2 days of nausea lack of appetite 3 loose stools per day and right lower quadrant pain.  Drinking only liquids no chills or sweats\par \par No apparent distress abdomen is soft however tenderness over McBurney's point no rebound\par \par Possible early appendicitis.  CBC sed rate and Ultrasound of the Appendix Ordered stat\par \par Patient also has thickened endometrial stripe with abnormal vaginal bleeding.  She is following up with gynecologist

## 2020-02-29 LAB
BASOPHILS # BLD AUTO: 0.09 K/UL
BASOPHILS NFR BLD AUTO: 0.8 %
EOSINOPHIL # BLD AUTO: 0.04 K/UL
EOSINOPHIL NFR BLD AUTO: 0.4 %
ERYTHROCYTE [SEDIMENTATION RATE] IN BLOOD BY WESTERGREN METHOD: 2 MM/HR
HCT VFR BLD CALC: 49 %
HGB BLD-MCNC: 15.8 G/DL
IMM GRANULOCYTES NFR BLD AUTO: 0.3 %
LYMPHOCYTES # BLD AUTO: 2.14 K/UL
LYMPHOCYTES NFR BLD AUTO: 19.8 %
MAN DIFF?: NORMAL
MCHC RBC-ENTMCNC: 30.2 PG
MCHC RBC-ENTMCNC: 32.2 GM/DL
MCV RBC AUTO: 93.7 FL
MONOCYTES # BLD AUTO: 0.82 K/UL
MONOCYTES NFR BLD AUTO: 7.6 %
NEUTROPHILS # BLD AUTO: 7.71 K/UL
NEUTROPHILS NFR BLD AUTO: 71.1 %
PLATELET # BLD AUTO: 546 K/UL
RBC # BLD: 5.23 M/UL
RBC # FLD: 12.5 %
WBC # FLD AUTO: 10.83 K/UL

## 2020-03-04 ENCOUNTER — FORM ENCOUNTER (OUTPATIENT)
Age: 60
End: 2020-03-04

## 2020-03-05 ENCOUNTER — OUTPATIENT (OUTPATIENT)
Dept: OUTPATIENT SERVICES | Facility: HOSPITAL | Age: 60
LOS: 1 days | End: 2020-03-05
Payer: SELF-PAY

## 2020-03-05 ENCOUNTER — APPOINTMENT (OUTPATIENT)
Dept: MRI IMAGING | Facility: CLINIC | Age: 60
End: 2020-03-05
Payer: COMMERCIAL

## 2020-03-05 DIAGNOSIS — Z00.8 ENCOUNTER FOR OTHER GENERAL EXAMINATION: ICD-10-CM

## 2020-03-05 DIAGNOSIS — N39.9 DISORDER OF URINARY SYSTEM, UNSPECIFIED: ICD-10-CM

## 2020-03-05 PROCEDURE — 72197 MRI PELVIS W/O & W/DYE: CPT | Mod: 26

## 2020-03-05 PROCEDURE — A9585: CPT

## 2020-03-05 PROCEDURE — 72197 MRI PELVIS W/O & W/DYE: CPT

## 2020-03-10 ENCOUNTER — APPOINTMENT (OUTPATIENT)
Dept: SURGERY | Facility: CLINIC | Age: 60
End: 2020-03-10

## 2020-03-10 ENCOUNTER — APPOINTMENT (OUTPATIENT)
Dept: BREAST CENTER | Facility: CLINIC | Age: 60
End: 2020-03-10
Payer: COMMERCIAL

## 2020-03-10 VITALS
TEMPERATURE: 97.4 F | HEIGHT: 67 IN | WEIGHT: 150 LBS | SYSTOLIC BLOOD PRESSURE: 126 MMHG | DIASTOLIC BLOOD PRESSURE: 76 MMHG | BODY MASS INDEX: 23.54 KG/M2 | HEART RATE: 68 BPM | OXYGEN SATURATION: 99 %

## 2020-03-10 PROCEDURE — 99214 OFFICE O/P EST MOD 30 MIN: CPT

## 2020-03-11 NOTE — ASSESSMENT
[FreeTextEntry1] : 60 yo female with fibrocystic breast disease presents with BIRADS 3 ultrasound.  Ultrasound demonstrated bilateral breast cyst vs nodules and recommendation is for short term follow up ultrasound in November.  Followup  imaging demonstrated stable probable benign left breast nodules.  Her clinical breast exam is benign.  Recommendation for follow up in 6 months and continue annual screening mammogram and sonogram.  \par 1.  Screening mammogram/sonogram 5/2020\par 2.  Followup in 6 months

## 2020-03-11 NOTE — HISTORY OF PRESENT ILLNESS
[FreeTextEntry1] : I had the pleasure of seeing STARLA HADDAD  in the office today review short term follow up imaging secondary to imaging demonstrating cyst vs nodules.\par \par She reports bilateral breast pain ( L>R).  She saw Dr. Jarrell (cardiologist) and underwent echocardiogram and states the test came back within normal limits.\par \par She denies dominant breast mass, skin changes or nipple discharge. She denies headaches, blurry vision, chest pain, SOB, abdominal pain, joint aches or difficult walking.\par \par G0.  Menses began at age 15.\par She denies personal history of malignancy.\par Family history is significant for maternal great aunt diagnosed with breast cancer at age 40, maternal cousin diagnosed with breast cancer at age 29, maternal cousin diagnosed with cancer at age 35 and maternal grandmother diagnosed with breast cancer at age 80.  Maternal grandmother diagnosed with colon cancer at age 88.\par \par Imaging:  [facility]\par 5/16/2019  MAMMO SCREENING ROGERIO BILAT\par Impression:  No mammographic evidence of malignancy.  Recommend routine screening.  BIRADS 2 benign\par \par 5/16/2019  BREAST ULTRASOUND BILATERAL COMPLETE\par Impression:  Probable benign findings bilaterally, right breast 8:00, left 5:00 and 5:30, as well as 11:00, for which short term interval follow up bilateral ultrasound is recommended in 6 months time.  BIRADS 3 Probably benign.\par \par 12/17/2019  Breast ultrasound bilateral complete\par Impression:  Stable probable benign left breast nodules.  Left beast cysts.  Right breast cysts.  Otherwise negative bilateral breast ultrasound.  Recommendation:  Six month follow up bilateral beast ultrasound and mammogram are recommended to correspond to the patients routine annual exam.  BIRADS 3 Probably benign findings.\par \par We reviewed and discussed clinical breast exam and recent followup imaging.  She understands imaging demonstrated stable probable benign left breast nodules.  Her clinical breast exam is benign.  Recommendation for follow up in 6 months and continue annual screening mammogram and sonogram.  She understands and agrees to plan.  All questions answered.

## 2020-03-11 NOTE — PHYSICAL EXAM
[Normocephalic] : normocephalic [Atraumatic] : atraumatic [EOMI] : extra ocular movement intact [PERRL] : pupils equal, round and reactive to light [Sclera nonicteric] : sclera nonicteric [Supple] : supple [No Supraclavicular Adenopathy] : no supraclavicular adenopathy [Examined in the supine and seated position] : examined in the supine and seated position [No dominant masses] : no dominant masses in right breast  [No dominant masses] : no dominant masses left breast [No Nipple Retraction] : no left nipple retraction [No Nipple Discharge] : no left nipple discharge [Breast Nipple Inversion] : nipples not inverted [Breast Nipple Retraction] : nipples not retracted [Breast Nipple Flattening] : nipples not flattened [Breast Nipple Fissures] : nipples not fissured [Breast Abnormal Lactation (Galactorrhea)] : no galactorrhea [Breast Abnormal Secretion Bloody Fluid] : no bloody discharge [Breast Abnormal Secretion Serous Fluid] : no serous discharge [Breast Abnormal Secretion Opalescent Fluid] : no milky discharge [No Axillary Lymphadenopathy] : no left axillary lymphadenopathy [No Edema] : no edema [No Rashes] : no rashes [No Ulceration] : no ulceration [de-identified] : No supraclavicular or axillary adenopathy. No dominant masses, normal to palpation. Everted nipple without discharge. No skin changes.\par \par  [de-identified] : No supraclavicular or axillary adenopathy. No dominant masses, normal to palpation. Everted nipple without discharge. No skin changes.\par \par

## 2020-03-16 ENCOUNTER — APPOINTMENT (OUTPATIENT)
Dept: OBGYN | Facility: CLINIC | Age: 60
End: 2020-03-16

## 2020-03-25 ENCOUNTER — APPOINTMENT (OUTPATIENT)
Dept: OBGYN | Facility: CLINIC | Age: 60
End: 2020-03-25
Payer: COMMERCIAL

## 2020-03-25 VITALS
SYSTOLIC BLOOD PRESSURE: 122 MMHG | BODY MASS INDEX: 22.91 KG/M2 | WEIGHT: 146 LBS | HEIGHT: 67 IN | DIASTOLIC BLOOD PRESSURE: 80 MMHG

## 2020-03-25 DIAGNOSIS — Z78.9 OTHER SPECIFIED HEALTH STATUS: ICD-10-CM

## 2020-03-25 PROCEDURE — 99205 OFFICE O/P NEW HI 60 MIN: CPT

## 2020-03-25 NOTE — DISCUSSION/SUMMARY
[FreeTextEntry1] : 59 year old postmenopausal  0, whose last menstrual period was 2017, is here as a new patient.\par \par Ms. HADDAD reports some spotting that occurred this past weekend.  She admits that she had some brown discharge in 2018 and some pink-tinged staining in 2019 but never sought medical attention.\par \par We discussed the issues associated with postmenopausal bleeding including the possibility of a cervical and/or uterine malignancy.  Ms. HADDAD admits that due to debilitating vulvodynia, she has not been able to tolerate a pelvic/speculum exam in over 30 years.  Her last Pap smear was in .  She cannot have intercourse or any "form of touch without suffering for months later."  She has seen Dr. Langley in the past but did not have a "good experience."  \par \par We spoke about the role of a transvaginal ultrasound to check the endometrial thickness.  She verbalized understanding but declined as she had a transabdominal sonogram on 2020, the limitations of which were explained.  The imaging revealed a uterus that was 8.9 x 4.0 x 5.6 cm.  The endometrium was thickened at 13 mm and the myometrium was homogeneous with a left fundal fibroid measuring 3.9 x 2.5 x 4.4 cm.  There was also a 2.3 x 2.0 x 2.7 cm right sided leiomyoma.    The right ovary measured 1.7 x 1.5 x 2.1 cm and the left ovary measured 1.7 x 2.7 x 2.2 cm.  There were no adnexal masses or any free fluid appreciated.\par \par She underwent a pelvic MRI on 3/05/2020 which demonstrated a 9.1 x 5.2 x 3.4 cm uterus with two pedunculated fibroids that were 3.2 and 2.5 cm, respectively.  The endometrium was 10 mm, thickened, with minimal heterogeneous enhancement.  The right ovary is "within normal limits with apparent pedunculated 2.5 cm simple cyst with proteinaceous material and/or chronic hemorrhage noted in retrospect on prior ultrasound 2018."  We talked about the significance, implications, limitations of the findings.\par \par The importance of a Pap smear and endometrial sampling was underscored.  She refused examination today and is desirous of a definitive procedure.  Contact information for gynecological oncologists, Dr. Rodrigues and alberto Carrasquillo\par \par The patient had inquiries about management of vulvodynia.  Over 60 minutes was spent in face-to-face consultation regarding her issues.\par \par All of her concerns were addressed, questions answered and reassurance was given.

## 2020-03-25 NOTE — HISTORY OF PRESENT ILLNESS
[___ Year(s) Ago] : [unfilled] year(s) ago [Good] : being in good health [Healthy Diet] : a healthy diet [Regular Exercise] : regular exercise [Last Pap Smear ___] : last Papanicolaou cytology done [unfilled] [Last Mammogram ___] : last mammogram done [unfilled] [Last Colonoscopy ___] : last colonoscopy done [unfilled] [Postmenopausal] : is postmenopausal [Up to Date] : up to date with ~his/her~ STD screening [Hot Flashes] : hot flashes [Anxiety] : anxiety [Definite:  ___ (Date)] : the last menstrual period was [unfilled] [Menarche Age: ____] : age at menarche was [unfilled] [Post-Menopause, No Sxs] : post-menopausal, currently without symptoms [Monogamous] : is monogamous [Male ___] : [unfilled] male [Burning] : burning [Stinging] : stinging [Soreness] : soreness [Weight Concerns] : no concerns with her weight [Menstrual Problems] : reports normal menses [Fever] : no fever [Nausea] : no nausea [Vomiting] : no vomiting [Diarrhea] : no diarrhea [Vaginal Bleeding] : no vaginal bleeding [Pelvic Pressure] : no pelvic pressure [Dysuria] : no dysuria [Itching] : no itching [Mass] : no mass [Lesion] : no lesion [Discharge] : no discharge [Localized Pain] : no localized pain [Mass (___cm)] : no palpable mass [Diffused Pain] : no diffused pain [Nipple Discharge] : no nipple discharge [Skin Color Change] : no skin color change [Night Sweats] : no night sweats [Vaginal Itching] : no vaginal itching [Dyspareunia] : no dyspareunia [Mood Changes] : no mood changes [Experiencing Menopausal Sxs] : not experiencing menopausal symptoms [Sexually Active] : is not sexually active [Contraception] : does not use contraception

## 2020-06-02 ENCOUNTER — APPOINTMENT (OUTPATIENT)
Dept: GYNECOLOGIC ONCOLOGY | Facility: CLINIC | Age: 60
End: 2020-06-02
Payer: COMMERCIAL

## 2020-06-02 VITALS
HEIGHT: 67 IN | WEIGHT: 144 LBS | BODY MASS INDEX: 22.6 KG/M2 | DIASTOLIC BLOOD PRESSURE: 78 MMHG | SYSTOLIC BLOOD PRESSURE: 116 MMHG

## 2020-06-02 DIAGNOSIS — R92.8 OTHER ABNORMAL AND INCONCLUSIVE FINDINGS ON DIAGNOSTIC IMAGING OF BREAST: ICD-10-CM

## 2020-06-02 DIAGNOSIS — Z80.52 FAMILY HISTORY OF MALIGNANT NEOPLASM OF BLADDER: ICD-10-CM

## 2020-06-02 PROCEDURE — 99204 OFFICE O/P NEW MOD 45 MIN: CPT

## 2020-06-02 NOTE — HISTORY OF PRESENT ILLNESS
[FreeTextEntry1] : Ms. Ruvalcaba is a nulligravida postmenopausal 60 year old female, with LMP reportedly in 12/2017, referred by Dr. Kincaid for postmenopausal bleeding.  \par Until recently she has not seen a gynecologist in 27 years.  She has significant history of severe vulvodynia and resultant refusal of pelvic exams.\par She notes intermittent postmenopausal spotting for greater than 2 years. Occurred once in 2018, once in 2019 then returned in Jan 2020 and is now occurring almost every day. \par \par 11/8/18- Pelvic US- transabdominal views only\par   Uterus 7.2 x 4.3 x 4.3 cm, with 2 fibroids noted\par   EMS- 5mm\par   Bilateral ovaries WNL\par \par 2/25/2020- Pelvic US- TA views only\par   Uterus- 8.9 x 4 x 5.6 cm, the myometrium is homogenous.  There is a left fundal leiomyoma measuring 3.9 x 2.5 x 4.4 cm, and another right-sided leiomyoma measures 2.3 x 2 x 2.7 cm\par   EMS- 13 mm\par   RTO/LTO- WNL\par \par 3/5/2020- Pelvic MRI\par   Uterus- 9.1 x 5.2 x 3.4 cm, several leiomyomata including 2 pedunculated left- sided leiomyoma to measuring 3.2 x and 2.5 cm, respectively as well as a fundal subserosal leiomyoma measuring 2.8 cm\par   EMS- 10 mm, thickened with minimal heterogenous enhancement\par   RTO- WNL with a apparent pedunculated 2.5 cm simple cyst with proteinaceous material and/or chronic hemorrhage noted in retrospect on prior US 11/8/18.  \par   LTO- WNL\par   No pelvic lymphadenopathy \par  \par PMH: Vulvodynia (gabapentin), Anxiety \par PSH: lumpectomy, multiple vulvar surgeries due to vulvodynia (Bartholin gland, hymenal tissue/scar tissue removed) under care of Dr. Jefferson Downs at [place]. \par Family history of cancer: father- mesothelioma, brother- bladder cancer, maternal grandmother with breast and colon cancer, maternal great aunt with breast, maternal second cousin with breast cancer.\par She has not had any genetic testing.\par  \par She reports chronic right hip pain.  \par She denies pelvic pain/pressure. She denies bloating, abdominal distention or change in bowel or urinary habits.  She denies recent weight changes.  She denies nausea/vomiting.  \par She has vaginal bleeding, but denies all other associated signs and symptoms.  \par She is here alone today to discuss further management. \par \par HM:\par Pap- n/a\par Mammo/ SBE/ CBE- followed by Dr. Briscoe (diffuse cystic mastopathy)\par EGD- 10/2016- hiatal hernia\par Colonoscopy/EGD- 10/2016- negative per patient\par \par Referred by (GYN) Dr. Kincaid \par PCP: Dr. Enciso

## 2020-06-02 NOTE — PROCEDURE
[Postmenopausal Bleeding] : postmenopausal bleeding [Endometrial Biopsy] : an endometrial biopsy [Verbal Consent] : verbal consent was obtained prior to the procedure and is detailed in the patient's record [Patient] : the patient [Site Verification] : site verification performed. [Betadine] : betadine [Time Out] : Time Out Procedure:The following was confirmed prior to the procedure: Correct patient identity, correct site, agreement on the procedure to be done and correct patient position. [Specimen: ___] : Specimen: [unfilled] [Yes] : the specimen was sent to pathology [No Complications] : none [Post procedure instructions and information given] : post procedure instructions and information given and reviewed with patient. [Tolerated Well] : the patient tolerated the procedure well [1] : 1 [FreeTextEntry1] : tenaculum and #11 blade used however was unable to enter cervical canal; patient actually tolerated entire exam very well.

## 2020-06-02 NOTE — REVIEW OF SYSTEMS
[Negative] : Musculoskeletal [Abn Vag Bleeding] : abnormal vaginal bleeding [FreeTextEntry3] : anxiety [FreeTextEntry4] : vulvodynia

## 2020-06-02 NOTE — PHYSICAL EXAM
[Normal] : Bimanual Exam: Normal [de-identified] : no lesions, normal introitus [de-identified] : extraslim speculum used [de-identified] : nulliparous epithelialized os; no blood in vault, no cervical lesions, PAP performed.

## 2020-06-02 NOTE — PAST MEDICAL HISTORY
[Postmenopausal] : The patient is postmenopausal [Menopause Age____] : age at menopause was [unfilled] [Total Preg ___] : G[unfilled] [Menarche Age ____] : age at menarche was [unfilled]

## 2020-06-02 NOTE — OB HISTORY
[Total Preg ___] : : [unfilled] [Menarche Age ____] : age at menarche was [unfilled] [Menopause  Age ____] : menopause occurred at age [unfilled]

## 2020-06-02 NOTE — LETTER BODY
[Dear  ___] : Dear  [unfilled], [I had the pleasure of evaluating your patient, [unfilled] for ___] : I had the pleasure of evaluating your patient, [unfilled] for [unfilled]. [Attached please find my note.] : Attached please find my note. [FreeTextEntry2] : She will be scheduled for surgery in the near future and I will keep you updated on her progress. Thank you very much for referring this [facility] patient.\par \par  [FreeTextEntry1] : pap/hpv

## 2020-06-03 LAB — HPV HIGH+LOW RISK DNA PNL CVX: NOT DETECTED

## 2020-06-04 LAB — CYTOLOGY CVX/VAG DOC THIN PREP: ABNORMAL

## 2020-06-08 ENCOUNTER — APPOINTMENT (OUTPATIENT)
Dept: DISASTER EMERGENCY | Facility: CLINIC | Age: 60
End: 2020-06-08

## 2020-06-09 ENCOUNTER — TRANSCRIPTION ENCOUNTER (OUTPATIENT)
Age: 60
End: 2020-06-09

## 2020-06-09 LAB — SARS-COV-2 N GENE NPH QL NAA+PROBE: NOT DETECTED

## 2020-06-10 ENCOUNTER — APPOINTMENT (OUTPATIENT)
Dept: GYNECOLOGIC ONCOLOGY | Facility: HOSPITAL | Age: 60
End: 2020-06-10

## 2020-06-10 ENCOUNTER — RESULT REVIEW (OUTPATIENT)
Age: 60
End: 2020-06-10

## 2020-06-10 ENCOUNTER — OUTPATIENT (OUTPATIENT)
Dept: OUTPATIENT SERVICES | Facility: HOSPITAL | Age: 60
LOS: 1 days | End: 2020-06-10
Payer: COMMERCIAL

## 2020-06-10 VITALS — TEMPERATURE: 100 F

## 2020-06-10 VITALS
RESPIRATION RATE: 18 BRPM | DIASTOLIC BLOOD PRESSURE: 71 MMHG | SYSTOLIC BLOOD PRESSURE: 140 MMHG | HEART RATE: 82 BPM | OXYGEN SATURATION: 99 % | TEMPERATURE: 98 F

## 2020-06-10 DIAGNOSIS — N93.9 ABNORMAL UTERINE AND VAGINAL BLEEDING, UNSPECIFIED: ICD-10-CM

## 2020-06-10 DIAGNOSIS — N88.2 STRICTURE AND STENOSIS OF CERVIX UTERI: ICD-10-CM

## 2020-06-10 DIAGNOSIS — N95.0 POSTMENOPAUSAL BLEEDING: ICD-10-CM

## 2020-06-10 DIAGNOSIS — N90.7 VULVAR CYST: Chronic | ICD-10-CM

## 2020-06-10 DIAGNOSIS — Z98.890 OTHER SPECIFIED POSTPROCEDURAL STATES: Chronic | ICD-10-CM

## 2020-06-10 LAB
BASOPHILS # BLD AUTO: 0.07 K/UL — SIGNIFICANT CHANGE UP (ref 0–0.2)
BASOPHILS NFR BLD AUTO: 0.8 % — SIGNIFICANT CHANGE UP (ref 0–2)
EOSINOPHIL # BLD AUTO: 0.04 K/UL — SIGNIFICANT CHANGE UP (ref 0–0.5)
EOSINOPHIL NFR BLD AUTO: 0.5 % — SIGNIFICANT CHANGE UP (ref 0–6)
HCT VFR BLD CALC: 44.6 % — SIGNIFICANT CHANGE UP (ref 34.5–45)
HGB BLD-MCNC: 14.6 G/DL — SIGNIFICANT CHANGE UP (ref 11.5–15.5)
IMM GRANULOCYTES NFR BLD AUTO: 0.2 % — SIGNIFICANT CHANGE UP (ref 0–1.5)
LYMPHOCYTES # BLD AUTO: 1.94 K/UL — SIGNIFICANT CHANGE UP (ref 1–3.3)
LYMPHOCYTES # BLD AUTO: 23.2 % — SIGNIFICANT CHANGE UP (ref 13–44)
MCHC RBC-ENTMCNC: 30.4 PG — SIGNIFICANT CHANGE UP (ref 27–34)
MCHC RBC-ENTMCNC: 32.7 GM/DL — SIGNIFICANT CHANGE UP (ref 32–36)
MCV RBC AUTO: 92.9 FL — SIGNIFICANT CHANGE UP (ref 80–100)
MONOCYTES # BLD AUTO: 0.56 K/UL — SIGNIFICANT CHANGE UP (ref 0–0.9)
MONOCYTES NFR BLD AUTO: 6.7 % — SIGNIFICANT CHANGE UP (ref 2–14)
NEUTROPHILS # BLD AUTO: 5.73 K/UL — SIGNIFICANT CHANGE UP (ref 1.8–7.4)
NEUTROPHILS NFR BLD AUTO: 68.6 % — SIGNIFICANT CHANGE UP (ref 43–77)
NRBC # BLD: 0 /100 WBCS — SIGNIFICANT CHANGE UP (ref 0–0)
PLATELET # BLD AUTO: 306 K/UL — SIGNIFICANT CHANGE UP (ref 150–400)
RBC # BLD: 4.8 M/UL — SIGNIFICANT CHANGE UP (ref 3.8–5.2)
RBC # FLD: 12.7 % — SIGNIFICANT CHANGE UP (ref 10.3–14.5)
WBC # BLD: 8.36 K/UL — SIGNIFICANT CHANGE UP (ref 3.8–10.5)
WBC # FLD AUTO: 8.36 K/UL — SIGNIFICANT CHANGE UP (ref 3.8–10.5)

## 2020-06-10 PROCEDURE — 85027 COMPLETE CBC AUTOMATED: CPT

## 2020-06-10 PROCEDURE — 58558 HYSTEROSCOPY BIOPSY: CPT

## 2020-06-10 PROCEDURE — 88305 TISSUE EXAM BY PATHOLOGIST: CPT | Mod: 26

## 2020-06-10 PROCEDURE — 88342 IMHCHEM/IMCYTCHM 1ST ANTB: CPT

## 2020-06-10 PROCEDURE — 88342 IMHCHEM/IMCYTCHM 1ST ANTB: CPT | Mod: 26

## 2020-06-10 PROCEDURE — 88341 IMHCHEM/IMCYTCHM EA ADD ANTB: CPT | Mod: 26

## 2020-06-10 PROCEDURE — 88305 TISSUE EXAM BY PATHOLOGIST: CPT

## 2020-06-10 PROCEDURE — 88341 IMHCHEM/IMCYTCHM EA ADD ANTB: CPT

## 2020-06-10 RX ORDER — LIDOCAINE HCL 20 MG/ML
0.2 VIAL (ML) INJECTION ONCE
Refills: 0 | Status: DISCONTINUED | OUTPATIENT
Start: 2020-06-10 | End: 2020-06-25

## 2020-06-10 RX ORDER — SODIUM CHLORIDE 9 MG/ML
3 INJECTION INTRAMUSCULAR; INTRAVENOUS; SUBCUTANEOUS EVERY 8 HOURS
Refills: 0 | Status: DISCONTINUED | OUTPATIENT
Start: 2020-06-10 | End: 2020-06-25

## 2020-06-10 NOTE — BRIEF OPERATIVE NOTE - NSICDXBRIEFPROCEDURE_GEN_ALL_CORE_FT
PROCEDURES:  Hysteroscopy with dilation and curettage of uterus 10-Wallace-2020 13:21:24  Nadia Higginbotham  Exam under anesthesia, vagina 10-Wallace-2020 13:21:00  Nadia Higginbotham

## 2020-06-10 NOTE — BRIEF OPERATIVE NOTE - OPERATION/FINDINGS
Approximately 8cm, slightly anteverted uterus. Adnexa nonpalpable bilaterally. No gross cervical or vaginal disease. Epithelized external cervical os. The uterine cavity was visualized via hysteroscopy and noted to contain copious tissue, obscuring the bilateral ostia. Good distention of the endometrial cavity was achieved with normal saline.  Dilation and curettage performed with tissue obtained.

## 2020-06-10 NOTE — ASU DISCHARGE PLAN (ADULT/PEDIATRIC) - CARE PROVIDER_API CALL
Brynn Montoya  GYNECOLOGIC ONCOLOGY  [address] [place] [id number]  Phone: [phone]  Fax: [phone]  Established Patient  Follow Up Time: 2 weeks

## 2020-06-10 NOTE — H&P PST ADULT - HISTORY OF PRESENT ILLNESS
Patient is a 60 year old menopausal female with past medical history thyroid nodules( Under surveillance). Pt reports irregular vaginal bleeding over last several months now Scheduled for D&C, diagnostic hysteroscopy with anesthesia with Dr. Montoya      ** Covid-19 PCR- negative

## 2020-06-10 NOTE — ASU DISCHARGE PLAN (ADULT/PEDIATRIC) - NURSING INSTRUCTIONS
Next dose of Tylenol will be on or after 0730PM ,today/tonight and every 6 hours afterwards for pain management, do not take any Tylenol containing products until this time. Your first dose of Tylenol was given at 0130PM. Do not exceed more than 4000mg of Tylenol in one 24 hour setting.

## 2020-06-10 NOTE — H&P PST ADULT - NSICDXPASTSURGICALHX_GEN_ALL_CORE_FT
PAST SURGICAL HISTORY:  History of lumpectomy left breast 1998 PAST SURGICAL HISTORY:  History of lumpectomy left breast 1998 ("benign")    Vulvar cyst Vulvartplast  x4

## 2020-06-10 NOTE — PRE-ANESTHESIA EVALUATION ADULT - NSANTHBMIRD_ENT_A_CORE
Patient would like to schedule appointment for IUD placement. Please call when available. We have permission to leave a voicemail if she does not answer.   No

## 2020-06-10 NOTE — H&P PST ADULT - NSICDXPASTMEDICALHX_GEN_ALL_CORE_FT
PAST MEDICAL HISTORY:  No pertinent past medical history PAST MEDICAL HISTORY:  Anxiety     Thyroid nodule Under survelliance

## 2020-06-10 NOTE — ASU DISCHARGE PLAN (ADULT/PEDIATRIC) - ASU DC SPECIAL INSTRUCTIONSFT
Please take ibuprofen and acetaminophen as needed for pain. Maintain a regular diet as tolerated, regular activity as tolerated, no heavy lifting for first two weeks.      Nothing per vagina: no intercourse, tampons or douching.      Call your provider if you experience fevers, chills, worsening abdominal pain, inability to urinate or worsening vaginal bleeding.    Follow up with Dr. Montoya in 2 weeks.

## 2020-06-13 PROBLEM — F41.9 ANXIETY DISORDER, UNSPECIFIED: Chronic | Status: ACTIVE | Noted: 2020-06-10

## 2020-06-13 PROBLEM — E04.1 NONTOXIC SINGLE THYROID NODULE: Chronic | Status: ACTIVE | Noted: 2020-06-10

## 2020-06-15 LAB
CANCER AG125 SERPL-ACNC: 14 U/ML
CEA SERPL-MCNC: 1.6 NG/ML

## 2020-06-16 ENCOUNTER — OUTPATIENT (OUTPATIENT)
Dept: OUTPATIENT SERVICES | Facility: HOSPITAL | Age: 60
LOS: 1 days | End: 2020-06-16
Payer: COMMERCIAL

## 2020-06-16 VITALS
HEART RATE: 114 BPM | HEIGHT: 67.5 IN | TEMPERATURE: 98 F | RESPIRATION RATE: 16 BRPM | OXYGEN SATURATION: 98 % | DIASTOLIC BLOOD PRESSURE: 80 MMHG | WEIGHT: 143.08 LBS | SYSTOLIC BLOOD PRESSURE: 124 MMHG

## 2020-06-16 DIAGNOSIS — C54.1 MALIGNANT NEOPLASM OF ENDOMETRIUM: ICD-10-CM

## 2020-06-16 DIAGNOSIS — N90.7 VULVAR CYST: Chronic | ICD-10-CM

## 2020-06-16 DIAGNOSIS — Z98.890 OTHER SPECIFIED POSTPROCEDURAL STATES: Chronic | ICD-10-CM

## 2020-06-16 LAB
ANION GAP SERPL CALC-SCNC: 15 MMO/L — HIGH (ref 7–14)
APPEARANCE UR: CLEAR — SIGNIFICANT CHANGE UP
BILIRUB UR-MCNC: NEGATIVE — SIGNIFICANT CHANGE UP
BLD GP AB SCN SERPL QL: NEGATIVE — SIGNIFICANT CHANGE UP
BLOOD UR QL VISUAL: SIGNIFICANT CHANGE UP
BUN SERPL-MCNC: 17 MG/DL — SIGNIFICANT CHANGE UP (ref 7–23)
CALCIUM SERPL-MCNC: 9.3 MG/DL — SIGNIFICANT CHANGE UP (ref 8.4–10.5)
CHLORIDE SERPL-SCNC: 102 MMOL/L — SIGNIFICANT CHANGE UP (ref 98–107)
CO2 SERPL-SCNC: 24 MMOL/L — SIGNIFICANT CHANGE UP (ref 22–31)
COLOR SPEC: SIGNIFICANT CHANGE UP
CREAT SERPL-MCNC: 0.71 MG/DL — SIGNIFICANT CHANGE UP (ref 0.5–1.3)
GLUCOSE SERPL-MCNC: 64 MG/DL — LOW (ref 70–99)
GLUCOSE UR-MCNC: NEGATIVE — SIGNIFICANT CHANGE UP
HBA1C BLD-MCNC: 5.4 % — SIGNIFICANT CHANGE UP (ref 4–5.6)
HCT VFR BLD CALC: 46.5 % — HIGH (ref 34.5–45)
HGB BLD-MCNC: 15 G/DL — SIGNIFICANT CHANGE UP (ref 11.5–15.5)
KETONES UR-MCNC: NEGATIVE — SIGNIFICANT CHANGE UP
LEUKOCYTE ESTERASE UR-ACNC: NEGATIVE — SIGNIFICANT CHANGE UP
MCHC RBC-ENTMCNC: 30.2 PG — SIGNIFICANT CHANGE UP (ref 27–34)
MCHC RBC-ENTMCNC: 32.3 % — SIGNIFICANT CHANGE UP (ref 32–36)
MCV RBC AUTO: 93.6 FL — SIGNIFICANT CHANGE UP (ref 80–100)
NITRITE UR-MCNC: NEGATIVE — SIGNIFICANT CHANGE UP
NRBC # FLD: 0 K/UL — SIGNIFICANT CHANGE UP (ref 0–0)
PH UR: 6 — SIGNIFICANT CHANGE UP (ref 5–8)
PLATELET # BLD AUTO: 495 K/UL — HIGH (ref 150–400)
PMV BLD: 11.8 FL — SIGNIFICANT CHANGE UP (ref 7–13)
POTASSIUM SERPL-MCNC: 3.9 MMOL/L — SIGNIFICANT CHANGE UP (ref 3.5–5.3)
POTASSIUM SERPL-SCNC: 3.9 MMOL/L — SIGNIFICANT CHANGE UP (ref 3.5–5.3)
PROT UR-MCNC: NEGATIVE — SIGNIFICANT CHANGE UP
RBC # BLD: 4.97 M/UL — SIGNIFICANT CHANGE UP (ref 3.8–5.2)
RBC # FLD: 13 % — SIGNIFICANT CHANGE UP (ref 10.3–14.5)
RH IG SCN BLD-IMP: POSITIVE — SIGNIFICANT CHANGE UP
SODIUM SERPL-SCNC: 141 MMOL/L — SIGNIFICANT CHANGE UP (ref 135–145)
SP GR SPEC: 1.01 — SIGNIFICANT CHANGE UP (ref 1–1.04)
UROBILINOGEN FLD QL: NORMAL — SIGNIFICANT CHANGE UP
WBC # BLD: 10.78 K/UL — HIGH (ref 3.8–10.5)
WBC # FLD AUTO: 10.78 K/UL — HIGH (ref 3.8–10.5)

## 2020-06-16 PROCEDURE — 93010 ELECTROCARDIOGRAM REPORT: CPT

## 2020-06-16 RX ORDER — SODIUM CHLORIDE 9 MG/ML
1000 INJECTION, SOLUTION INTRAVENOUS
Refills: 0 | Status: DISCONTINUED | OUTPATIENT
Start: 2020-06-22 | End: 2020-06-22

## 2020-06-16 NOTE — H&P PST ADULT - NSICDXPASTMEDICALHX_GEN_ALL_CORE_FT
PAST MEDICAL HISTORY:  Anxiety     Thyroid nodule Under survelliance PAST MEDICAL HISTORY:  Anxiety     Endometrial ca     Fibromyalgia     Thyroid nodule Under survelliance

## 2020-06-16 NOTE — H&P PST ADULT - HISTORY OF PRESENT ILLNESS
Patient is a 60 year old menopausal female with past medical history thyroid nodules( Under surveillance). Pt reports irregular vaginal bleeding over last several months now Scheduled for D&C, diagnostic hysteroscopy with anesthesia with Dr. Montoya      ** Covid-19 PCR- negative Pt is a 60 yr old female scheduled for Robotic Hysterectomy B/L salpingo oophorectomy Staging with Node Dissection Poss Lap Cysto with Dr Montoya tentatively 6/22/20. Pt had D&C 6/10 after c/o of abnormal vaginal bleeding for 2 months - positive for Ca. Pt continues to bleed slightly - c/o of occasional right flank pain,   COVID test appt 6/19/20

## 2020-06-16 NOTE — H&P PST ADULT - NSICDXPROBLEM_GEN_ALL_CORE_FT
PROBLEM DIAGNOSES  Problem: Endometrial ca  Assessment and Plan: Pt scheduled for surgery and preop instructions including instructions for taking Famotidine and for Chlorhexidine use in showering on the day of surgery, given verbally and with use of  written materials, and patient confirming understanding of such instructions using  teach back   method.  Pt to have COVID test 6/19/20 -

## 2020-06-16 NOTE — H&P PST ADULT - MUSCULOSKELETAL COMMENTS
Pt hx cervical stenosis - c/o of pain with certain motions - hx of right TMJ pain and "cracking " of joint when opening jaw

## 2020-06-16 NOTE — H&P PST ADULT - NSICDXPASTSURGICALHX_GEN_ALL_CORE_FT
PAST SURGICAL HISTORY:  History of lumpectomy left breast 1998 ("benign")    Vulvar cyst Vulvartplast  x4

## 2020-06-17 ENCOUNTER — APPOINTMENT (OUTPATIENT)
Dept: GYNECOLOGIC ONCOLOGY | Facility: CLINIC | Age: 60
End: 2020-06-17
Payer: COMMERCIAL

## 2020-06-17 DIAGNOSIS — N88.2 STRICTURE AND STENOSIS OF CERVIX UTERI: ICD-10-CM

## 2020-06-17 PROBLEM — C54.1 MALIGNANT NEOPLASM OF ENDOMETRIUM: Chronic | Status: ACTIVE | Noted: 2020-06-16

## 2020-06-17 PROBLEM — M79.7 FIBROMYALGIA: Chronic | Status: ACTIVE | Noted: 2020-06-16

## 2020-06-17 LAB
CULTURE RESULTS: SIGNIFICANT CHANGE UP
SPECIMEN SOURCE: SIGNIFICANT CHANGE UP

## 2020-06-17 PROCEDURE — 99215 OFFICE O/P EST HI 40 MIN: CPT | Mod: 95

## 2020-06-18 PROBLEM — N88.2 CERVICAL STENOSIS (UTERINE CERVIX): Status: RESOLVED | Noted: 2020-06-02 | Resolved: 2020-06-18

## 2020-06-19 ENCOUNTER — APPOINTMENT (OUTPATIENT)
Dept: CT IMAGING | Facility: CLINIC | Age: 60
End: 2020-06-19
Payer: COMMERCIAL

## 2020-06-19 ENCOUNTER — APPOINTMENT (OUTPATIENT)
Dept: DISASTER EMERGENCY | Facility: CLINIC | Age: 60
End: 2020-06-19

## 2020-06-19 ENCOUNTER — OUTPATIENT (OUTPATIENT)
Dept: OUTPATIENT SERVICES | Facility: HOSPITAL | Age: 60
LOS: 1 days | End: 2020-06-19
Payer: COMMERCIAL

## 2020-06-19 DIAGNOSIS — N90.7 VULVAR CYST: Chronic | ICD-10-CM

## 2020-06-19 DIAGNOSIS — Z98.890 OTHER SPECIFIED POSTPROCEDURAL STATES: Chronic | ICD-10-CM

## 2020-06-19 DIAGNOSIS — C54.1 MALIGNANT NEOPLASM OF ENDOMETRIUM: ICD-10-CM

## 2020-06-19 LAB — SARS-COV-2 N GENE NPH QL NAA+PROBE: NOT DETECTED

## 2020-06-19 PROCEDURE — 74177 CT ABD & PELVIS W/CONTRAST: CPT

## 2020-06-19 PROCEDURE — 74177 CT ABD & PELVIS W/CONTRAST: CPT | Mod: 26

## 2020-06-19 NOTE — ASU PATIENT PROFILE, ADULT - BLOOD TRANSFUSION, PREVIOUS, PROFILE
no H/O cystoscopy  10 years ago  History of lobectomy of lung  left upper lobe 2015  History of lung surgery  wedge removed 2013

## 2020-06-21 ENCOUNTER — TRANSCRIPTION ENCOUNTER (OUTPATIENT)
Age: 60
End: 2020-06-21

## 2020-06-22 ENCOUNTER — APPOINTMENT (OUTPATIENT)
Dept: GYNECOLOGIC ONCOLOGY | Facility: HOSPITAL | Age: 60
End: 2020-06-22

## 2020-06-22 ENCOUNTER — INPATIENT (INPATIENT)
Facility: HOSPITAL | Age: 60
LOS: 0 days | Discharge: HOME | End: 2020-06-23
Attending: OBSTETRICS & GYNECOLOGY | Admitting: OBSTETRICS & GYNECOLOGY
Payer: COMMERCIAL

## 2020-06-22 ENCOUNTER — RESULT REVIEW (OUTPATIENT)
Age: 60
End: 2020-06-22

## 2020-06-22 VITALS
HEART RATE: 81 BPM | RESPIRATION RATE: 16 BRPM | WEIGHT: 143.08 LBS | DIASTOLIC BLOOD PRESSURE: 70 MMHG | HEIGHT: 67.5 IN | OXYGEN SATURATION: 98 % | TEMPERATURE: 99 F | SYSTOLIC BLOOD PRESSURE: 120 MMHG

## 2020-06-22 DIAGNOSIS — C54.1 MALIGNANT NEOPLASM OF ENDOMETRIUM: ICD-10-CM

## 2020-06-22 DIAGNOSIS — N90.7 VULVAR CYST: Chronic | ICD-10-CM

## 2020-06-22 DIAGNOSIS — Z98.890 OTHER SPECIFIED POSTPROCEDURAL STATES: Chronic | ICD-10-CM

## 2020-06-22 LAB
BASOPHILS # BLD AUTO: 0.05 K/UL — SIGNIFICANT CHANGE UP (ref 0–0.2)
BASOPHILS NFR BLD AUTO: 0.2 % — SIGNIFICANT CHANGE UP (ref 0–2)
EOSINOPHIL # BLD AUTO: 0 K/UL — SIGNIFICANT CHANGE UP (ref 0–0.5)
EOSINOPHIL NFR BLD AUTO: 0 % — SIGNIFICANT CHANGE UP (ref 0–6)
GLUCOSE BLDC GLUCOMTR-MCNC: 96 MG/DL — SIGNIFICANT CHANGE UP (ref 70–99)
HCT VFR BLD CALC: 43.6 % — SIGNIFICANT CHANGE UP (ref 34.5–45)
HGB BLD-MCNC: 14.2 G/DL — SIGNIFICANT CHANGE UP (ref 11.5–15.5)
IMM GRANULOCYTES NFR BLD AUTO: 0.6 % — SIGNIFICANT CHANGE UP (ref 0–1.5)
LYMPHOCYTES # BLD AUTO: 0.75 K/UL — LOW (ref 1–3.3)
LYMPHOCYTES # BLD AUTO: 3.2 % — LOW (ref 13–44)
MCHC RBC-ENTMCNC: 30.3 PG — SIGNIFICANT CHANGE UP (ref 27–34)
MCHC RBC-ENTMCNC: 32.6 % — SIGNIFICANT CHANGE UP (ref 32–36)
MCV RBC AUTO: 93.2 FL — SIGNIFICANT CHANGE UP (ref 80–100)
MONOCYTES # BLD AUTO: 0.69 K/UL — SIGNIFICANT CHANGE UP (ref 0–0.9)
MONOCYTES NFR BLD AUTO: 2.9 % — SIGNIFICANT CHANGE UP (ref 2–14)
NEUTROPHILS # BLD AUTO: 21.81 K/UL — HIGH (ref 1.8–7.4)
NEUTROPHILS NFR BLD AUTO: 93.1 % — HIGH (ref 43–77)
NRBC # FLD: 0 K/UL — SIGNIFICANT CHANGE UP (ref 0–0)
PLATELET # BLD AUTO: 378 K/UL — SIGNIFICANT CHANGE UP (ref 150–400)
PMV BLD: 11.7 FL — SIGNIFICANT CHANGE UP (ref 7–13)
RBC # BLD: 4.68 M/UL — SIGNIFICANT CHANGE UP (ref 3.8–5.2)
RBC # FLD: 13.1 % — SIGNIFICANT CHANGE UP (ref 10.3–14.5)
RH IG SCN BLD-IMP: POSITIVE — SIGNIFICANT CHANGE UP
WBC # BLD: 23.45 K/UL — HIGH (ref 3.8–10.5)
WBC # FLD AUTO: 23.45 K/UL — HIGH (ref 3.8–10.5)

## 2020-06-22 PROCEDURE — 88342 IMHCHEM/IMCYTCHM 1ST ANTB: CPT | Mod: 26

## 2020-06-22 PROCEDURE — 88331 PATH CONSLTJ SURG 1 BLK 1SPC: CPT | Mod: 26

## 2020-06-22 PROCEDURE — 88309 TISSUE EXAM BY PATHOLOGIST: CPT | Mod: 26

## 2020-06-22 PROCEDURE — 88307 TISSUE EXAM BY PATHOLOGIST: CPT | Mod: 26

## 2020-06-22 PROCEDURE — 88342 IMHCHEM/IMCYTCHM 1ST ANTB: CPT | Mod: 26,59

## 2020-06-22 PROCEDURE — 88341 IMHCHEM/IMCYTCHM EA ADD ANTB: CPT | Mod: 26

## 2020-06-22 PROCEDURE — 88305 TISSUE EXAM BY PATHOLOGIST: CPT | Mod: 26,59

## 2020-06-22 PROCEDURE — 88305 TISSUE EXAM BY PATHOLOGIST: CPT | Mod: 26

## 2020-06-22 PROCEDURE — 88112 CYTOPATH CELL ENHANCE TECH: CPT | Mod: 26

## 2020-06-22 PROCEDURE — 88341 IMHCHEM/IMCYTCHM EA ADD ANTB: CPT | Mod: 26,59

## 2020-06-22 RX ORDER — OXYCODONE HYDROCHLORIDE 5 MG/1
1 TABLET ORAL
Qty: 5 | Refills: 0
Start: 2020-06-22

## 2020-06-22 RX ORDER — HYDROMORPHONE HYDROCHLORIDE 2 MG/ML
0.5 INJECTION INTRAMUSCULAR; INTRAVENOUS; SUBCUTANEOUS
Refills: 0 | Status: DISCONTINUED | OUTPATIENT
Start: 2020-06-22 | End: 2020-06-23

## 2020-06-22 RX ORDER — GABAPENTIN 400 MG/1
400 CAPSULE ORAL EVERY 12 HOURS
Refills: 0 | Status: DISCONTINUED | OUTPATIENT
Start: 2020-06-22 | End: 2020-06-23

## 2020-06-22 RX ORDER — SODIUM CHLORIDE 9 MG/ML
1000 INJECTION, SOLUTION INTRAVENOUS
Refills: 0 | Status: DISCONTINUED | OUTPATIENT
Start: 2020-06-22 | End: 2020-06-23

## 2020-06-22 RX ORDER — GABAPENTIN 400 MG/1
1 CAPSULE ORAL
Qty: 0 | Refills: 0 | DISCHARGE

## 2020-06-22 RX ORDER — ACETAMINOPHEN 500 MG
975 TABLET ORAL EVERY 6 HOURS
Refills: 0 | Status: DISCONTINUED | OUTPATIENT
Start: 2020-06-22 | End: 2020-06-23

## 2020-06-22 RX ORDER — HEPARIN SODIUM 5000 [USP'U]/ML
5000 INJECTION INTRAVENOUS; SUBCUTANEOUS EVERY 8 HOURS
Refills: 0 | Status: DISCONTINUED | OUTPATIENT
Start: 2020-06-22 | End: 2020-06-23

## 2020-06-22 RX ORDER — IBUPROFEN 200 MG
1 TABLET ORAL
Qty: 0 | Refills: 0 | DISCHARGE

## 2020-06-22 RX ORDER — L.ACIDOPH/B.ANIMALIS/B.LONGUM 15B CELL
1 CAPSULE ORAL
Qty: 0 | Refills: 0 | DISCHARGE

## 2020-06-22 RX ORDER — IBUPROFEN 200 MG
600 TABLET ORAL EVERY 6 HOURS
Refills: 0 | Status: DISCONTINUED | OUTPATIENT
Start: 2020-06-22 | End: 2020-06-23

## 2020-06-22 RX ORDER — ACETAMINOPHEN 500 MG
2 TABLET ORAL
Qty: 0 | Refills: 0 | DISCHARGE

## 2020-06-22 RX ADMIN — HYDROMORPHONE HYDROCHLORIDE 0.5 MILLIGRAM(S): 2 INJECTION INTRAMUSCULAR; INTRAVENOUS; SUBCUTANEOUS at 22:35

## 2020-06-22 RX ADMIN — SODIUM CHLORIDE 125 MILLILITER(S): 9 INJECTION, SOLUTION INTRAVENOUS at 21:50

## 2020-06-22 NOTE — BRIEF OPERATIVE NOTE - NSICDXBRIEFPROCEDURE_GEN_ALL_CORE_FT
PROCEDURES:  Robot-assisted laparoscopic total hysterectomy with bilateral salpingo-oophorectomy (BSO), sentinel lymph node biopsy, and cystoscopy using da Jorge Si 22-Jun-2020 21:20:28  [place]

## 2020-06-22 NOTE — CHART NOTE - NSCHARTNOTEFT_GEN_A_CORE
PA GynOnc Post Op Note    Pt seen and examined at bedside. Pt still in PACU. Pt resting comfortably.  Pt denies fever, chills, chest pain, SOB, nausea, vomiting, lightheadedness, dizziness.  Pt is without compliants    T(F): 98.2 (06-22-20 @ 21:10), Max: 98.6 (06-22-20 @ 09:45)  HR: 82 (06-22-20 @ 23:15) (72 - 93)  BP: 115/79 (06-22-20 @ 23:15) (115/79 - 128/76)  RR: 16 (06-22-20 @ 23:15) (14 - 17)  SpO2: 96% (06-22-20 @ 23:15) (95% - 100%)  Wt(kg): --  I&O's Detail    22 Jun 2020 07:01  -  22 Jun 2020 23:57  --------------------------------------------------------  IN:    lactated ringers.: 500 mL  Total IN: 500 mL    OUT:  Total OUT: 0 mL    Total NET: 500 mL      Physical Exam:  Constitutional: WDWN female, NAD AxOx3  Skin: warm and dry, no breakdowns noted  Chest: s1s2+, RRR, clear to auscultation bilaterally, no w/r/r    Abdomen: soft, nondistended, no guarding, no rebound, hypoactive bowel sounds,  Appropriate tenderness noted   Incisional site:  vertical dressing clean, dry, intact.   Extremities: no lower extremity edema or calf tenderness bilaterally; intermittent compression stockings in place     LABS:                        14.2   23.45 )-----------( 378      ( 22 Jun 2020 23:10 )             43.6       a/p: This 60y female, s/p     CV: hemodynamically stable; H/H stable  PUL: adequate on RA  GI: NPO at present, tolerating   : Pt is DTV 230am    ID: afebrile, WBC stable,   DVT prophylaxis: SQ Heparin, Venodynes  Pain Management: controlled  continue IV Fluids  pt's home medications added  pt to stay overnight  d/w gyn onc team    [facility], PAC  #[id number] spectra PA GynOn Post Op Note    Pt seen and examined at bedside. Pt still in PACU. Pt resting comfortably.  Pt denies fever, chills, chest pain, SOB, nausea, vomiting, lightheadedness, dizziness.  Pt is without compliants    T(F): 98.2 (06-22-20 @ 21:10), Max: 98.6 (06-22-20 @ 09:45)  HR: 82 (06-22-20 @ 23:15) (72 - 93)  BP: 115/79 (06-22-20 @ 23:15) (115/79 - 128/76)  RR: 16 (06-22-20 @ 23:15) (14 - 17)  SpO2: 96% (06-22-20 @ 23:15) (95% - 100%)  Wt(kg): --  I&O's Detail    22 Jun 2020 07:01  -  22 Jun 2020 23:57  --------------------------------------------------------  IN:    lactated ringers.: 500 mL  Total IN: 500 mL    OUT:  Total OUT: 0 mL    Total NET: 500 mL      Physical Exam:  Constitutional: WDWN female, NAD AxOx3  Skin: warm and dry, no breakdowns noted  Chest: s1s2+, RRR, clear to auscultation bilaterally, no w/r/r    Abdomen: soft, nondistended, no guarding, no rebound, hypoactive bowel sounds,  Appropriate tenderness noted   Incisional site:  4 scope sites all clean, dry, dressing intact.   Extremities: no lower extremity edema or calf tenderness bilaterally; intermittent compression stockings in place     LABS:                        14.2   23.45 )-----------( 378      ( 22 Jun 2020 23:10 )             43.6       a/p: This 60y female, s/p Robotic Assisted TLH, BSO, SLNMD, removal of right paratubal cyst, Cystoscopy. Pt stable    CV: hemodynamically stable; H/H stable  PUL: adequate on RA  GI: NPO at present, tolerating   : Pt is DTV 230am    ID: afebrile, WBC stable,   DVT prophylaxis: SQ Heparin, Venodynes  Pain Management: controlled  continue IV Fluids  pt's home medications added  pt to stay overnight  d/w gyn onc team    [place], KEVIN  #[id number] spectra PA GynOn Post Op Note    Pt seen and examined at bedside. Pt still in PACU. Pt resting comfortably.  Pt denies fever, chills, chest pain, SOB, nausea, vomiting, lightheadedness, dizziness.  Pt is without complaints    T(F): 98.2 (06-22-20 @ 21:10), Max: 98.6 (06-22-20 @ 09:45)  HR: 82 (06-22-20 @ 23:15) (72 - 93)  BP: 115/79 (06-22-20 @ 23:15) (115/79 - 128/76)  RR: 16 (06-22-20 @ 23:15) (14 - 17)  SpO2: 96% (06-22-20 @ 23:15) (95% - 100%)  Wt(kg): --  I&O's Detail    22 Jun 2020 07:01  -  22 Jun 2020 23:57  --------------------------------------------------------  IN:    lactated ringers.: 500 mL  Total IN: 500 mL    OUT:  Total OUT: 0 mL    Total NET: 500 mL      Physical Exam:  Constitutional: WDWN female, NAD AxOx3  Skin: warm and dry, no breakdowns noted  Chest: s1s2+, RRR, clear to auscultation bilaterally, no w/r/r    Abdomen: soft, nondistended, no guarding, no rebound, hypoactive bowel sounds,  Appropriate tenderness noted   Incisional site:  4 scope sites all clean, dry, dressing intact.   Extremities: no lower extremity edema or calf tenderness bilaterally; intermittent compression stockings in place     LABS:                        14.2   23.45 )-----------( 378      ( 22 Jun 2020 23:10 )             43.6       a/p: This 60y female, s/p Robotic Assisted TLH, BSO, SLNMD, removal of right paratubal cyst, Cystoscopy. Pt stable    CV: hemodynamically stable; H/H stable  PUL: adequate on RA  GI: NPO at present, tolerating   : Pt is DTV 230am    ID: afebrile, WBC stable,   DVT prophylaxis: SQ Heparin, Venodynes  Pain Management: controlled  continue IV Fluids  pt's home medications added  pt to stay overnight  d/w gyn onc team    [place], PAC  #[id number] spectra

## 2020-06-22 NOTE — BRIEF OPERATIVE NOTE - OPERATION/FINDINGS
Exam under anesthesia: small midposition uterus, mobile  Abdominal survey: clear liver edge. No gross intraperitoneal disease. Normal-appearing appendix  Pelvis: Torsed R paratubal cyst adherent to sidewall, removed with distal portion of R tube. Diffuse dense retroperitoneal fibrosis. Pedunculated and subserosal fibroids. Normal ovaries bilaterally Exam under anesthesia: small midposition uterus, mobile  Abdominal survey: clear liver edge. No gross intraperitoneal disease. Normal-appearing appendix  Pelvis: Torsion of R paratubal cyst adherent to sidewall, removed with distal portion of R tube. Diffuse dense retroperitoneal fibrosis. Pedunculated and subserosal fibroids. Normal ovaries bilaterally

## 2020-06-22 NOTE — ASU DISCHARGE PLAN (ADULT/PEDIATRIC) - ASU DC SPECIAL INSTRUCTIONSFT
Postoperative Instructions        Pain control      For pain control, take the followin. Motrin 600mg four times a day, take with food  2. Add Tylenol 975 four times a day, alternated with motrin  3. Add oxycodone as needed for severe pain not managed well by motrin and tylenol. A prescription has been sent to your pharmacy on file.     Postoperative restrictions    Do not drive or make important decisions for 24 hours after anesthesia. You may feel drowsy for 24 hours and should have a responsible adult with you during that time. Nothing in the vagina (tampons, sexual intercourse), No tub baths, pools or hot tubs for 8 weeks (showers are ok!). No lifting anything heavier than 15 lbs, no strenuous exercise for 8 weeks after surgery. Do not pull or cut any stitches that you see around your incision.            Vaginal bleeding    Spotting and intermittent passage of blood clots per vagina is normal in first few weeks after surgery. If you are soaking 1 pad per hour, that is not normal and you should notify Dr. Montoya' office and seek medical attention right away.        Vaginal discharge    Vaginal discharge (all colors) is normal after vaginal surgery. If you’ve had vaginal surgery, you have sutures in your vagina which take 3 months to fully absorb. You may have vaginal discharge during this time. This is normal.      Signs of Infection  Some postoperative pain and discomfort is normal. However, if you experience any of the following, you may be developing an infection and should be seen by your doctor: pain that does not get better with the oral medications listed above, fever greater than 100.4F, foul smelling discharge coming from the surgical site, nausea and vomiting that does not stop (especially if you are unable to tolerate oral intake), or inability to urinate. If you experience any of these symptoms, call your doctor's office to be seen right away.    Follow Up  Call Dr. Montoya' office to schedule a postoperative appointment in 2 weeks. The results from the procedure will be discussed with you at that time.

## 2020-06-22 NOTE — BRIEF OPERATIVE NOTE - SPECIMENS
R and L pelvic sentinal lymph nodes; fibroids; distal R tube and ovary and paratubal cyst; Uterus, cervix, L tube and ovary, and proximal R fallopian tube R and L pelvic sentinel lymph nodes; fibroid; distal R tube and paratubal cyst; Uterus, cervix, tubes and ovaries

## 2020-06-22 NOTE — ASU DISCHARGE PLAN (ADULT/PEDIATRIC) - CARE PROVIDER_API CALL
Brynn Montoya  GYNECOLOGIC ONCOLOGY  [address] [place] [address]  Phone: [phone]  Fax: [phone]  Follow Up Time: 2 weeks

## 2020-06-22 NOTE — ASU DISCHARGE PLAN (ADULT/PEDIATRIC) - PROCEDURE
Robotic-Assisted Total Laparoscopic Hysterectomy, Bilateral Salpingo-oophorectomy, Sentinal Lymph Node Mapping and Dissection Robotic-Assisted Total Laparoscopic Hysterectomy, Bilateral Salpingo-oophorectomy, [place] Lymph Node Mapping and Dissection, Cystoscopy

## 2020-06-23 ENCOUNTER — TRANSCRIPTION ENCOUNTER (OUTPATIENT)
Age: 60
End: 2020-06-23

## 2020-06-23 VITALS
RESPIRATION RATE: 17 BRPM | SYSTOLIC BLOOD PRESSURE: 136 MMHG | HEART RATE: 77 BPM | OXYGEN SATURATION: 97 % | DIASTOLIC BLOOD PRESSURE: 86 MMHG

## 2020-06-23 DIAGNOSIS — C54.1 MALIGNANT NEOPLASM OF ENDOMETRIUM: ICD-10-CM

## 2020-06-23 LAB
BASOPHILS NFR SPEC: 0 % — SIGNIFICANT CHANGE UP (ref 0–2)
BLASTS # FLD: 0 % — SIGNIFICANT CHANGE UP (ref 0–0)
EOSINOPHIL NFR FLD: 0 % — SIGNIFICANT CHANGE UP (ref 0–6)
GIANT PLATELETS BLD QL SMEAR: PRESENT — SIGNIFICANT CHANGE UP
LYMPHOCYTES NFR SPEC AUTO: 2.6 % — LOW (ref 13–44)
METAMYELOCYTES # FLD: 0 % — SIGNIFICANT CHANGE UP (ref 0–1)
MONOCYTES NFR BLD: 1.8 % — LOW (ref 2–9)
MYELOCYTES NFR BLD: 0 % — SIGNIFICANT CHANGE UP (ref 0–0)
NEUTROPHIL AB SER-ACNC: 95.6 % — HIGH (ref 43–77)
NEUTS BAND # BLD: 0 % — SIGNIFICANT CHANGE UP (ref 0–6)
OTHER - HEMATOLOGY %: 0 — SIGNIFICANT CHANGE UP
PLATELET COUNT - ESTIMATE: NORMAL — SIGNIFICANT CHANGE UP
POLYCHROMASIA BLD QL SMEAR: SLIGHT — SIGNIFICANT CHANGE UP
PROMYELOCYTES # FLD: 0 % — SIGNIFICANT CHANGE UP (ref 0–0)
VARIANT LYMPHS # BLD: 0 % — SIGNIFICANT CHANGE UP

## 2020-06-23 RX ORDER — HYDROMORPHONE HYDROCHLORIDE 2 MG/ML
0.5 INJECTION INTRAMUSCULAR; INTRAVENOUS; SUBCUTANEOUS
Qty: 3 | Refills: 0
Start: 2020-06-23

## 2020-06-23 RX ORDER — OXYCODONE HYDROCHLORIDE 5 MG/1
5 TABLET ORAL EVERY 4 HOURS
Refills: 0 | Status: DISCONTINUED | OUTPATIENT
Start: 2020-06-23 | End: 2020-06-23

## 2020-06-23 RX ORDER — ONDANSETRON 8 MG/1
1 TABLET, FILM COATED ORAL
Qty: 5 | Refills: 0
Start: 2020-06-23

## 2020-06-23 RX ADMIN — GABAPENTIN 400 MILLIGRAM(S): 400 CAPSULE ORAL at 02:13

## 2020-06-23 RX ADMIN — Medication 1 MILLIGRAM(S): at 06:31

## 2020-06-23 RX ADMIN — Medication 975 MILLIGRAM(S): at 06:30

## 2020-06-23 RX ADMIN — Medication 975 MILLIGRAM(S): at 00:15

## 2020-06-23 NOTE — DISCHARGE NOTE PROVIDER - NSDCMRMEDTOKEN_GEN_ALL_CORE_FT
gabapentin 400 mg oral capsule: 1 cap(s) orally 2 times a day  HYDROmorphone 2 mg oral tablet: 0.5 tab(s) orally every 6 hours, As Needed - for severe pain MDD:4   LORazepam 1 mg oral tablet: 1 tab(s) orally once a day, As Needed  Motrin 600 mg oral tablet: 1 tab(s) orally every 6 hours  Tylenol 500 mg oral tablet: 2 tab(s) orally every 6 hours  Zofran 4 mg oral tablet: 1 tab(s) orally every 8 hours, As Needed -for nausea MDD:4

## 2020-06-23 NOTE — DISCHARGE NOTE NURSING/CASE MANAGEMENT/SOCIAL WORK - PATIENT PORTAL LINK FT
You can access the FollowMyHealth Patient Portal offered by [facility] by registering at the following website: [email or web address]. By joining [facility]’s FollowMyHealth portal, you will also be able to view your health information using other applications (apps) compatible with our system.

## 2020-06-23 NOTE — PROGRESS NOTE ADULT - SUBJECTIVE AND OBJECTIVE BOX
Gyn ONC Progress Note HD#2 POD#1    Patient seen and examined at bedside. She notes slight burning pain with urination, but overall reports that her pain (both abdominal and vulvar) is well controlled.  She is ambulating and tolerating oral intake (ate a sandwich overnight) but has not yet passed flatus.  Pt denies fever, chills, chest pain, SOB, nausea, vomiting, lightheadedness, dizziness.      Objective:  T(F): 97.6 (06-23-20 @ 00:00), Max: 98.6 (06-22-20 @ 09:45)  HR: 77 (06-23-20 @ 01:38) (72 - 98)  BP: 136/86 (06-23-20 @ 01:38) (115/79 - 136/86)  RR: 17 (06-23-20 @ 01:38) (14 - 17)  SpO2: 97% (06-23-20 @ 01:38) (95% - 100%)      I&O's Summary  22 Jun 2020 07:01  -  23 Jun 2020 07:00  --------------------------------------------------------  IN: 740 mL / OUT: 250 mL / NET: 490 mL      CAPILLARY BLOOD GLUCOSE  POCT Blood Glucose.: 96 mg/dL (22 Jun 2020 10:06)      MEDICATIONS  (STANDING):  acetaminophen   Tablet .. 975 milliGRAM(s) Oral every 6 hours  gabapentin 400 milliGRAM(s) Oral every 12 hours  heparin   Injectable 5000 Unit(s) SubCutaneous every 8 hours  lactated ringers. 1000 milliLiter(s) (125 mL/Hr) IV Continuous <Continuous>    MEDICATIONS  (PRN):  ibuprofen  Tablet. 600 milliGRAM(s) Oral every 6 hours PRN Mild Pain (1 - 3)  oxyCODONE    IR 5 milliGRAM(s) Oral every 4 hours PRN Moderate Pain (4 - 6)      Physical Exam:  Constitutional: NAD, A+O x3  CV: RRR  Lungs: Clear to auscultation bilaterally  Abdomen: Soft, nondistended, no guarding, no rebound tenderness. +Bowel sounds.   Incision: 4 port site incisions clean, dry, intact  Extremities: No lower extremity edema or calf tenderness bilaterally; Venodynes in place

## 2020-06-23 NOTE — PROGRESS NOTE ADULT - ASSESSMENT
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 Ms. Ruvalcaba is currently stable, with adequate pain control and recovering well postoperatively, meeting all appropriate milestones.

## 2020-06-23 NOTE — DISCHARGE NOTE PROVIDER - NSDCFUADDINST_GEN_ALL_CORE_FT
Please take ibuprofen and acetaminophen as needed for pain. A prescription for Dilaudid was sent to your pharmacy for severe breakthrough pain, you may take 1/2 tablet (1mg) every 6 hours, as needed. A prescription for Zofran was also sent to your pharmacy for nausea, as needed.     Maintain a regular diet as tolerated, regular activity as tolerated, no heavy lifting for 8 weeks.    Nothing per vagina for 8 weeks: no intercourse, tampons or douching.      Call Dr. Montoya if you experience fevers, chills, worsening abdominal pain, inability to urinate or worsening vaginal bleeding.    Follow up with Dr. Montoya at your scheduled appointment on July 14th at 11:30AM.

## 2020-06-23 NOTE — DISCHARGE NOTE PROVIDER - HOSPITAL COURSE
59 y/o s/p RA-TLH, BSO, SLND, excision of right paratubal cyst for FIGO grade II endometrial carcinoma on 6/22/2020. Please see operative note for details.  The patients Villarreal catheter was removed intraoperatively, and she voided. The patient was transferred to the floor post-op in stable condition with PO medication for pain control, and with a regular diet. Hct: 46.5->43.6. On the day of discharge the patient is afebrile and hemodynamically stable. She is ambulating without assistance, voiding spontaneously, and tolerating regular diet. He pain is well controlled on oral medication. She is cleared for discharge with instructions for appropriate follow up. Patient has an appointment scheduled with Dr. Montoya on 7/14 at 11:30AM.

## 2020-06-23 NOTE — DISCHARGE NOTE PROVIDER - NSDCACTIVITY_GEN_ALL_CORE
Do not drive or operate machinery/Do not make important decisions/Stairs allowed/No heavy lifting/straining/Walking - Outdoors allowed/Walking - Indoors allowed/Showering allowed

## 2020-06-23 NOTE — DISCHARGE NOTE PROVIDER - PROVIDER TOKENS
PROVIDER:[TOKEN:[[id number]],SCHEDULEDAPPT:[07/14/2020],SCHEDULEDAPPTTIME:[11:30 AM],ESTABLISHEDPATIENT:[T]]

## 2020-06-23 NOTE — DISCHARGE NOTE PROVIDER - NSDCFUSCHEDAPPT_GEN_ALL_CORE_FT
STARLA HADDAD ; 07/14/2020 ; [facility] Gynonc [address] ; 09/15/2020 ; [facility] Gen Surg [address] [facility]

## 2020-06-23 NOTE — DISCHARGE NOTE PROVIDER - REASON FOR ADMISSION
Sent [employer] s/p RA-TLH, BSO, SLND, excision of right paratubal cyst for FIGO grade II endometrial carcinoma on 6/22/2020

## 2020-06-23 NOTE — DISCHARGE NOTE PROVIDER - CARE PROVIDER_API CALL
Brynn Montoya  GYNECOLOGIC ONCOLOGY  [address] [place] [id number]  Phone: [phone]  Fax: [phone]  Established Patient  Scheduled Appointment: 07/14/2020 11:30 AM

## 2020-06-23 NOTE — DISCHARGE NOTE NURSING/CASE MANAGEMENT/SOCIAL WORK - NSDCPNINST_GEN_ALL_CORE
If you experience any nausea, vomiting or diarrhea, a temperature of 100.4 or higher- chills or sweating, redness, bleeding, or pus at incision site, pain that is not controlled with medication prescribed by MD- report symptoms to the MD. Do not take baths- shower only- do not scrub insertion site or take bandage off; you may let the water and soap run over bandage- no creams or powders near incision. No driving while taking pain medication, it causes drowsiness & constipation. Continue a regular diet and drink water daily to promote hydration. No heavy lifting, pulling or pushing heavy objects. Nothing per vagina for 8 weeks: no intercourse, tampons or douching. Schedule follow up apt. with MD within a week.

## 2020-06-23 NOTE — PROGRESS NOTE ADULT - PROBLEM SELECTOR PLAN 1
Neuro: Continue oral Motrin, Acetaminophen, Oxycodone and home Gabapentin PRN for pain. Patient with PMHx of Anxiety, continue home Lorazepam PRN.   CV: Hemodynamically stable.   Pulm: Saturating well on RA. Increase incentive spirometry.  GI: Continue regular diet, no issues overnight.   : Voiding spontaneously.   Heme: Continue HSQ and Venodynes for DVT prophylaxis. Encourage ambulation.  Dispo: Continue routine postoperative care. Anticipate discharge home today.     Nadia Higginbotham PGY-2 Neuro: Continue oral Motrin, Acetaminophen, home Gabapentin PRN for pain.  Pt had dilaudid for pain relief in pacu with good effect; she declines oxycodone as it causes hallucinations.  Rx: dilaudid 1mg 5 tabs for home. Patient with PMHx of Anxiety, continue home Lorazepam PRN.    CV: Hemodynamically stable.   Pulm: Saturating well on RA. Increase incentive spirometry.  GI: Continue regular diet, no issues overnight. Pt requesting zofran prn for home in case of nausea (not nauseated now) will rx zofran 4mg ODT 5 tabs for home  : Voiding spontaneously.   Heme: Continue HSQ and Venodynes for DVT prophylaxis. Encourage ambulation.  Dispo: Discharge home    Nadia Higginbotham PGY-2    Agree with above.  MD Mireya

## 2020-06-25 ENCOUNTER — APPOINTMENT (OUTPATIENT)
Dept: GYNECOLOGIC ONCOLOGY | Facility: CLINIC | Age: 60
End: 2020-06-25

## 2020-07-01 LAB
NON-GYNECOLOGICAL CYTOLOGY STUDY: SIGNIFICANT CHANGE UP
SURGICAL PATHOLOGY STUDY: SIGNIFICANT CHANGE UP

## 2020-07-14 ENCOUNTER — APPOINTMENT (OUTPATIENT)
Dept: GYNECOLOGIC ONCOLOGY | Facility: CLINIC | Age: 60
End: 2020-07-14
Payer: COMMERCIAL

## 2020-07-14 DIAGNOSIS — Z87.42 PERSONAL HISTORY OF OTHER DISEASES OF THE FEMALE GENITAL TRACT: ICD-10-CM

## 2020-07-14 DIAGNOSIS — N83.201 UNSPECIFIED OVARIAN CYST, RIGHT SIDE: ICD-10-CM

## 2020-07-14 DIAGNOSIS — R93.89 ABNORMAL FINDINGS ON DIAGNOSTIC IMAGING OF OTHER SPECIFIED BODY STRUCTURES: ICD-10-CM

## 2020-07-14 PROCEDURE — 99024 POSTOP FOLLOW-UP VISIT: CPT

## 2020-07-14 RX ORDER — CLOTRIMAZOLE AND BETAMETHASONE DIPROPIONATE 10; .5 MG/G; MG/G
1-0.05 CREAM TOPICAL TWICE DAILY
Qty: 1 | Refills: 3 | Status: DISCONTINUED | COMMUNITY
Start: 2019-12-20 | End: 2020-07-14

## 2020-08-12 ENCOUNTER — OUTPATIENT (OUTPATIENT)
Dept: OUTPATIENT SERVICES | Facility: HOSPITAL | Age: 60
LOS: 1 days | Discharge: HOME | End: 2020-08-12
Payer: COMMERCIAL

## 2020-08-12 DIAGNOSIS — Z98.890 OTHER SPECIFIED POSTPROCEDURAL STATES: Chronic | ICD-10-CM

## 2020-08-12 DIAGNOSIS — N90.7 VULVAR CYST: Chronic | ICD-10-CM

## 2020-08-12 DIAGNOSIS — C44.40 UNSPECIFIED MALIGNANT NEOPLASM OF SKIN OF SCALP AND NECK: ICD-10-CM

## 2020-08-13 ENCOUNTER — APPOINTMENT (OUTPATIENT)
Dept: HEMATOLOGY ONCOLOGY | Facility: CLINIC | Age: 60
End: 2020-08-13
Payer: COMMERCIAL

## 2020-08-13 PROCEDURE — 96040M: CUSTOM

## 2020-08-19 ENCOUNTER — APPOINTMENT (OUTPATIENT)
Dept: FAMILY MEDICINE | Facility: CLINIC | Age: 60
End: 2020-08-19
Payer: COMMERCIAL

## 2020-08-19 VITALS
WEIGHT: 140 LBS | HEIGHT: 67 IN | OXYGEN SATURATION: 98 % | DIASTOLIC BLOOD PRESSURE: 86 MMHG | HEART RATE: 100 BPM | BODY MASS INDEX: 21.97 KG/M2 | SYSTOLIC BLOOD PRESSURE: 122 MMHG

## 2020-08-19 PROCEDURE — 99213 OFFICE O/P EST LOW 20 MIN: CPT

## 2020-08-19 NOTE — PLAN
[FreeTextEntry1] : Start Miralax with Dulcolax to clear bowel. Disimpact at home. \par Continue with higher fiber diet, include vegetables, increase fluids and daily Metamucil. \par FU with gynecology

## 2020-08-19 NOTE — PHYSICAL EXAM
[No Respiratory Distress] : no respiratory distress  [Normal] : soft, non-tender, non-distended, no masses palpated, no HSM and normal bowel sounds

## 2020-08-19 NOTE — HISTORY OF PRESENT ILLNESS
[FreeTextEntry8] : Constipated for past week and no BM. She had hysterectomy two months ago and has avoided pushing with BM as she was worried about straining sutures from hysterectomy. Took Dulcolax, Metamucil and Pepto Bismol with no relief. No abdominal pain. \par She had normal check up three weeks after surgery. She has had slight sanguinous discharge vaginally for past month. Not sexually active. She has notified gynecology of this.\par Patient noted elevated WBC from her hospital blood work. She is worried about this.

## 2020-08-21 LAB
BASOPHILS # BLD AUTO: 0.07 K/UL
BASOPHILS NFR BLD AUTO: 0.6 %
EOSINOPHIL # BLD AUTO: 0.03 K/UL
EOSINOPHIL NFR BLD AUTO: 0.3 %
HCT VFR BLD CALC: 47.3 %
HGB BLD-MCNC: 15.6 G/DL
IMM GRANULOCYTES NFR BLD AUTO: 0.4 %
LYMPHOCYTES # BLD AUTO: 1.59 K/UL
LYMPHOCYTES NFR BLD AUTO: 14.1 %
MAN DIFF?: NORMAL
MCHC RBC-ENTMCNC: 31 PG
MCHC RBC-ENTMCNC: 33 GM/DL
MCV RBC AUTO: 93.8 FL
MONOCYTES # BLD AUTO: 0.9 K/UL
MONOCYTES NFR BLD AUTO: 8 %
NEUTROPHILS # BLD AUTO: 8.66 K/UL
NEUTROPHILS NFR BLD AUTO: 76.6 %
PLATELET # BLD AUTO: 514 K/UL
RBC # BLD: 5.04 M/UL
RBC # FLD: 13 %
WBC # FLD AUTO: 11.29 K/UL

## 2020-08-25 ENCOUNTER — APPOINTMENT (OUTPATIENT)
Dept: GYNECOLOGIC ONCOLOGY | Facility: CLINIC | Age: 60
End: 2020-08-25
Payer: COMMERCIAL

## 2020-08-25 VITALS
DIASTOLIC BLOOD PRESSURE: 80 MMHG | SYSTOLIC BLOOD PRESSURE: 125 MMHG | BODY MASS INDEX: 21.5 KG/M2 | WEIGHT: 137 LBS | HEIGHT: 67 IN

## 2020-08-25 PROCEDURE — 99024 POSTOP FOLLOW-UP VISIT: CPT

## 2020-09-15 ENCOUNTER — APPOINTMENT (OUTPATIENT)
Dept: SURGERY | Facility: CLINIC | Age: 60
End: 2020-09-15
Payer: COMMERCIAL

## 2020-09-15 VITALS
BODY MASS INDEX: 21.97 KG/M2 | SYSTOLIC BLOOD PRESSURE: 114 MMHG | HEIGHT: 67 IN | OXYGEN SATURATION: 100 % | DIASTOLIC BLOOD PRESSURE: 73 MMHG | TEMPERATURE: 98.1 F | HEART RATE: 73 BPM | WEIGHT: 140 LBS

## 2020-09-15 PROCEDURE — 99214 OFFICE O/P EST MOD 30 MIN: CPT

## 2020-09-15 NOTE — HISTORY OF PRESENT ILLNESS
[FreeTextEntry1] : I had the pleasure of seeing STARLA HADDAD  in the office today for a follow up visit.\par \par She reports bilateral breast pain ( L>R).  She saw Dr. Jarrell (cardiologist) and underwent echocardiogram and states the test came back within normal limits.\par \par She denies dominant breast mass, skin changes or nipple discharge. She denies headaches, blurry vision, chest pain, SOB, abdominal pain, joint aches or difficult walking.\par \par G0.  Menses began at age 15.\par She denies personal history of malignancy.\par Family history is significant for maternal great aunt diagnosed with breast cancer at age 40, maternal cousin diagnosed with breast cancer at age 29, maternal cousin diagnosed with cancer at age 35 and maternal grandmother diagnosed with breast cancer at age 80.  Maternal grandmother diagnosed with colon cancer at age 88.\par \par Imaging:  [facility]  Radiology\par 5/16/2019  MAMMO SCREENING ROGERIO BILAT\par Impression:  No mammographic evidence of malignancy.  Recommend routine screening.  BIRADS 2 benign\par \par 5/16/2019  BREAST ULTRASOUND BILATERAL COMPLETE\par Impression:  Probable benign findings bilaterally, right breast 8:00, left 5:00 and 5:30, as well as 11:00, for which short term interval follow up bilateral ultrasound is recommended in 6 months time.  BIRADS 3 Probably benign.\par \par 12/17/2019  Breast ultrasound bilateral complete\par Impression:  Stable probable benign left breast nodules.  Left beast cysts.  Right breast cysts.  Otherwise negative bilateral breast ultrasound.  Recommendation:  Six month follow up bilateral beast ultrasound and mammogram are recommended to correspond to the patients routine annual exam.  BIRADS 3 Probably benign findings.\par \par We reviewed clinical exam and her exam is benign today.  She is scheduled for her screening mammogram and ultrasound next week at [facility] and she is to call the office the next day to review results.  She states she is healing well from her recent surgery due to her newly diagnosed endometrial cancer and is followed by Dr. Montoya.  Recommendation for follow up in 6 months if imaging is stable and benign.  \par \par She understands and agrees to plan.

## 2020-09-15 NOTE — ASSESSMENT
[FreeTextEntry1] : 59 yo female with fibrocystic breast disease presents followup exam.  She recently diagnosed with endometrial cancer and underwent surgery with Dr. Montoay.  She is overdue for her mammogram and sonogram and is scheduled next week at [facility].  Recommendation for:\par 1.  Screening mammogram/sonogram due now\par 2.  Followup in 6 months if imaging is stable and benign

## 2020-09-15 NOTE — PHYSICAL EXAM
[Atraumatic] : atraumatic [Normocephalic] : normocephalic [EOMI] : extra ocular movement intact [Sclera nonicteric] : sclera nonicteric [PERRL] : pupils equal, round and reactive to light [Supple] : supple [No Supraclavicular Adenopathy] : no supraclavicular adenopathy [Examined in the supine and seated position] : examined in the supine and seated position [No dominant masses] : no dominant masses in right breast  [No dominant masses] : no dominant masses left breast [No Nipple Discharge] : no right nipple discharge [No Nipple Retraction] : no left nipple retraction [No Axillary Lymphadenopathy] : no left axillary lymphadenopathy [No Edema] : no edema [No Rashes] : no rashes [No Ulceration] : no ulceration [Breast Nipple Flattening] : nipples not flattened [Breast Nipple Retraction] : nipples not retracted [Breast Nipple Inversion] : nipples not inverted [Breast Abnormal Lactation (Galactorrhea)] : no galactorrhea [Breast Nipple Fissures] : nipples not fissured [Breast Abnormal Secretion Serous Fluid] : no serous discharge [Breast Abnormal Secretion Opalescent Fluid] : no milky discharge [Breast Abnormal Secretion Bloody Fluid] : no bloody discharge [de-identified] : No supraclavicular or axillary adenopathy. No dominant masses, normal to palpation. Everted nipple without discharge. No skin changes.\par \par  [de-identified] : No supraclavicular or axillary adenopathy. No dominant masses, normal to palpation. Everted nipple without discharge. No skin changes.\par \par

## 2020-09-29 ENCOUNTER — APPOINTMENT (OUTPATIENT)
Dept: GYNECOLOGIC ONCOLOGY | Facility: CLINIC | Age: 60
End: 2020-09-29

## 2020-10-09 ENCOUNTER — APPOINTMENT (OUTPATIENT)
Dept: FAMILY MEDICINE | Facility: CLINIC | Age: 60
End: 2020-10-09
Payer: COMMERCIAL

## 2020-10-09 VITALS
SYSTOLIC BLOOD PRESSURE: 124 MMHG | WEIGHT: 143 LBS | HEIGHT: 67 IN | OXYGEN SATURATION: 98 % | DIASTOLIC BLOOD PRESSURE: 80 MMHG | TEMPERATURE: 98 F | BODY MASS INDEX: 22.44 KG/M2 | HEART RATE: 90 BPM

## 2020-10-09 PROCEDURE — 99214 OFFICE O/P EST MOD 30 MIN: CPT | Mod: 25

## 2020-10-09 PROCEDURE — G0008: CPT

## 2020-10-09 PROCEDURE — 90686 IIV4 VACC NO PRSV 0.5 ML IM: CPT

## 2020-10-09 NOTE — HISTORY OF PRESENT ILLNESS
[de-identified] : Status post AL/BSO for endometrial cancer now experiencing hot flashes.  In addition her mood is depressed due to vulvodynia which is exacerbating chronic unhappiness in her marriage.  We will give therapeutic trial of Effexor 37.5 may increase to 75 mg\par \par Leukocytosis with thrombocytopenia will recheck CBC patient denies infection fevers etc.\par \par Patient has occasional urge incontinence suggest as needed Sudafed watch for palpitations

## 2020-10-09 NOTE — ASSESSMENT
[FreeTextEntry1] : Fax to for hot flashes and depression.  Marital counseling offered patient declined\par \par Check CBC

## 2020-10-11 LAB
25(OH)D3 SERPL-MCNC: 27.8 NG/ML
ALBUMIN SERPL ELPH-MCNC: 4.8 G/DL
ALP BLD-CCNC: 92 U/L
ALT SERPL-CCNC: 44 U/L
ANION GAP SERPL CALC-SCNC: 14 MMOL/L
AST SERPL-CCNC: 29 U/L
BASOPHILS # BLD AUTO: 0.09 K/UL
BASOPHILS NFR BLD AUTO: 1 %
BILIRUB SERPL-MCNC: 0.6 MG/DL
BUN SERPL-MCNC: 20 MG/DL
CALCIUM SERPL-MCNC: 9.6 MG/DL
CHLORIDE SERPL-SCNC: 102 MMOL/L
CO2 SERPL-SCNC: 24 MMOL/L
CREAT SERPL-MCNC: 0.7 MG/DL
EOSINOPHIL # BLD AUTO: 0.08 K/UL
EOSINOPHIL NFR BLD AUTO: 0.9 %
ESTIMATED AVERAGE GLUCOSE: 105 MG/DL
GLUCOSE SERPL-MCNC: 94 MG/DL
HBA1C MFR BLD HPLC: 5.3 %
HCT VFR BLD CALC: 46.4 %
HGB BLD-MCNC: 14.9 G/DL
IMM GRANULOCYTES NFR BLD AUTO: 0.2 %
LYMPHOCYTES # BLD AUTO: 2.24 K/UL
LYMPHOCYTES NFR BLD AUTO: 24.5 %
MAN DIFF?: NORMAL
MCHC RBC-ENTMCNC: 30.6 PG
MCHC RBC-ENTMCNC: 32.1 GM/DL
MCV RBC AUTO: 95.3 FL
MONOCYTES # BLD AUTO: 0.71 K/UL
MONOCYTES NFR BLD AUTO: 7.8 %
NEUTROPHILS # BLD AUTO: 6.02 K/UL
NEUTROPHILS NFR BLD AUTO: 65.6 %
PLATELET # BLD AUTO: 491 K/UL
POTASSIUM SERPL-SCNC: 4.6 MMOL/L
PROT SERPL-MCNC: 6.9 G/DL
RBC # BLD: 4.87 M/UL
RBC # FLD: 13.1 %
SODIUM SERPL-SCNC: 141 MMOL/L
TSH SERPL-ACNC: 1.42 UIU/ML
WBC # FLD AUTO: 9.16 K/UL

## 2020-10-12 PROBLEM — Z86.018 HISTORY OF UTERINE LEIOMYOMA: Status: RESOLVED | Noted: 2020-03-25 | Resolved: 2020-10-12

## 2020-10-13 ENCOUNTER — APPOINTMENT (OUTPATIENT)
Dept: GYNECOLOGIC ONCOLOGY | Facility: CLINIC | Age: 60
End: 2020-10-13
Payer: COMMERCIAL

## 2020-10-13 DIAGNOSIS — Z86.018 PERSONAL HISTORY OF OTHER BENIGN NEOPLASM: ICD-10-CM

## 2020-10-13 DIAGNOSIS — N93.9 ABNORMAL UTERINE AND VAGINAL BLEEDING, UNSPECIFIED: ICD-10-CM

## 2020-10-13 DIAGNOSIS — R10.31 RIGHT LOWER QUADRANT PAIN: ICD-10-CM

## 2020-10-13 PROCEDURE — 99212 OFFICE O/P EST SF 10 MIN: CPT

## 2020-10-16 LAB
APPEARANCE: CLEAR
BILIRUBIN URINE: NEGATIVE
BLOOD URINE: NEGATIVE
COLOR: COLORLESS
GLUCOSE QUALITATIVE U: NEGATIVE
KETONES URINE: NEGATIVE
LEUKOCYTE ESTERASE URINE: NEGATIVE
NITRITE URINE: NEGATIVE
PH URINE: 7
PROTEIN URINE: NEGATIVE
SPECIFIC GRAVITY URINE: 1
UROBILINOGEN URINE: NORMAL

## 2020-11-17 ENCOUNTER — APPOINTMENT (OUTPATIENT)
Dept: GYNECOLOGIC ONCOLOGY | Facility: CLINIC | Age: 60
End: 2020-11-17
Payer: COMMERCIAL

## 2020-11-17 VITALS
DIASTOLIC BLOOD PRESSURE: 75 MMHG | WEIGHT: 144 LBS | SYSTOLIC BLOOD PRESSURE: 115 MMHG | BODY MASS INDEX: 22.6 KG/M2 | HEART RATE: 89 BPM | HEIGHT: 67 IN

## 2020-11-17 PROCEDURE — 99213 OFFICE O/P EST LOW 20 MIN: CPT

## 2020-12-08 ENCOUNTER — APPOINTMENT (OUTPATIENT)
Dept: GYNECOLOGIC ONCOLOGY | Facility: CLINIC | Age: 60
End: 2020-12-08
Payer: COMMERCIAL

## 2020-12-08 VITALS — SYSTOLIC BLOOD PRESSURE: 142 MMHG | HEART RATE: 114 BPM | DIASTOLIC BLOOD PRESSURE: 87 MMHG

## 2020-12-08 PROCEDURE — 99213 OFFICE O/P EST LOW 20 MIN: CPT | Mod: 25

## 2020-12-08 PROCEDURE — 99072 ADDL SUPL MATRL&STAF TM PHE: CPT

## 2020-12-08 PROCEDURE — 57100 BIOPSY VAGINAL MUCOSA SIMPLE: CPT

## 2020-12-16 LAB — CORE LAB BIOPSY: NORMAL

## 2020-12-22 ENCOUNTER — NON-APPOINTMENT (OUTPATIENT)
Age: 60
End: 2020-12-22

## 2020-12-23 ENCOUNTER — APPOINTMENT (OUTPATIENT)
Dept: NUCLEAR MEDICINE | Facility: CLINIC | Age: 60
End: 2020-12-23

## 2020-12-24 ENCOUNTER — APPOINTMENT (OUTPATIENT)
Dept: CT IMAGING | Facility: CLINIC | Age: 60
End: 2020-12-24
Payer: COMMERCIAL

## 2020-12-24 ENCOUNTER — RESULT REVIEW (OUTPATIENT)
Age: 60
End: 2020-12-24

## 2020-12-24 ENCOUNTER — OUTPATIENT (OUTPATIENT)
Dept: OUTPATIENT SERVICES | Facility: HOSPITAL | Age: 60
LOS: 1 days | End: 2020-12-24
Payer: COMMERCIAL

## 2020-12-24 DIAGNOSIS — N90.7 VULVAR CYST: Chronic | ICD-10-CM

## 2020-12-24 DIAGNOSIS — Z00.8 ENCOUNTER FOR OTHER GENERAL EXAMINATION: ICD-10-CM

## 2020-12-24 DIAGNOSIS — Z98.890 OTHER SPECIFIED POSTPROCEDURAL STATES: Chronic | ICD-10-CM

## 2020-12-24 DIAGNOSIS — C54.1 MALIGNANT NEOPLASM OF ENDOMETRIUM: ICD-10-CM

## 2020-12-24 PROCEDURE — 74177 CT ABD & PELVIS W/CONTRAST: CPT | Mod: 26

## 2020-12-24 PROCEDURE — 71260 CT THORAX DX C+: CPT

## 2020-12-24 PROCEDURE — 71260 CT THORAX DX C+: CPT | Mod: 26

## 2020-12-24 PROCEDURE — 74177 CT ABD & PELVIS W/CONTRAST: CPT

## 2020-12-29 ENCOUNTER — NON-APPOINTMENT (OUTPATIENT)
Age: 60
End: 2020-12-29

## 2020-12-31 ENCOUNTER — APPOINTMENT (OUTPATIENT)
Dept: RADIATION ONCOLOGY | Facility: CLINIC | Age: 60
End: 2020-12-31
Payer: COMMERCIAL

## 2020-12-31 VITALS
BODY MASS INDEX: 22.44 KG/M2 | RESPIRATION RATE: 16 BRPM | HEIGHT: 67 IN | HEART RATE: 86 BPM | SYSTOLIC BLOOD PRESSURE: 135 MMHG | DIASTOLIC BLOOD PRESSURE: 80 MMHG | WEIGHT: 143 LBS

## 2020-12-31 PROCEDURE — 99072 ADDL SUPL MATRL&STAF TM PHE: CPT

## 2020-12-31 PROCEDURE — 99205 OFFICE O/P NEW HI 60 MIN: CPT

## 2020-12-31 NOTE — DISEASE MANAGEMENT
[Pathological] : TNM Stage: p [IA] : IA [FreeTextEntry4] : recurrent [TTNM] : 1 [NTNM] : 0 [MTNM] : 0

## 2020-12-31 NOTE — PHYSICAL EXAM
[Nondistended] : nondistended [Abdomen Tenderness] : non-tender [Normal] : no palpable adenopathy [Femoral Lymph Nodes Enlarged Bilaterally] : femoral [Inguinal Lymph Nodes Enlarged Bilaterally] : inguinal [de-identified] : well healed surgical scars.  [de-identified] : deferred to next vist as per pt request

## 2020-12-31 NOTE — HISTORY OF PRESENT ILLNESS
[FreeTextEntry1] : The patient is 59 yo nulligravida postmenopausal female with locally recurrent endometrial cancer. \par She presented with postmenopausal bleeding in 12/2020. She had not seen a gynecologist in 27 years and has significant history of severe vulvodynia and resultant refusal of pelvic exams.\par She noted intermittent postmenopausal spotting for greater than 2 years. Occurred once in 2018, once in 2019 then returned in Jan 2020 and is now occurring almost every day. 2/25/2020- Pelvic US- TA views only showed uterus- 8.9 x 4 x 5.6 cm, the myometrium is homogenous. There was a left fundal leiomyoma measuring 3.9 x 2.5 x 4.4 cm, and another right-sided leiomyoma measures 2.3 x 2 x 2.7 cm EMS- 13 mm RTO/LTO- WNL\par 3/5/2020- Pelvic MRI revealed uterus- 9.1 x 5.2 x 3.4 cm, several leiomyomata including 2 pedunculated left- sided leiomyoma to measuring 3.2 x and 2.5 cm, respectively as well as a fundal subserosal leiomyoma measuring 2.8 cm EMS- 10 mm, thickened with minimal heterogenous enhancement RTO- WNL with a apparent pedunculated 2.5 cm simple cyst with proteinaceous material and/or chronic hemorrhage noted in retrospect on prior US 11/8/18.  LTO- WNL No pelvic lymphadenopathy.\par Diagnostic D&C done 6/12/2020 showed grade 2 endometrial cancer with clear cell features. Robotic TLH/BSO  done on 6/22/2020. Pathology revealed 3.5 cm endometrioid adenocarcinoma without myometrial invasion and 4 negative pelvic lymph nodes. The  pelvic washings were negative for malignant cells. MMR: loss of nuclear expression of MLH1/PMS2; methylation detected, indicative of sporadic mutation. No adjuvant therapy was indicated.\par \par She had a postop checkup due to complaints of vaginal spotting on 12/8/2020. A 3 cm vaginal cuff lesion was noted and biopsy was performed and a friable mass was seen. Pathology showed a clear cell carcinoma. She continues to have spotting and vaginal bleeding on a daily basis, intermittent vulvodynia. Very anxious about the diagnosis.She was deemed not a surgical candidate and presents today to discuss the role of radiation treatment in her care with her  on the telephone.

## 2020-12-31 NOTE — REVIEW OF SYSTEMS
[Recent Change In Weight] : ~T recent weight change [Dry Eyes] : dryness of the eyes [Palpitations] : palpitations [Joint Pain] : joint pain [Insomnia] : insomnia [Anxiety] : anxiety [Depression] : depression [Negative] : Allergic/Immunologic [Suicidal] : not suicidal [FreeTextEntry2] : wt loss 15lb [FreeTextEntry3] : cataracts [FreeTextEntry4] : TMJ history [FreeTextEntry5] : probably due to anxiety

## 2020-12-31 NOTE — VITALS
[Maximal Pain Intensity: 8/10] : 8/10 [Least Pain Intensity: 0/10] : 0/10 [OTC] : OTC [Adjuvant (neuroleptics)] : adjuvant (neuroleptics) [Date: ____________] : Patient's last distress assessment performed on [unfilled]. [9 - Distress Level] : Distress Level: 9 [90: Able to carry normal activity; minor signs or symptoms of disease.] : 90: Able to carry normal activity; minor signs or symptoms of disease.

## 2020-12-31 NOTE — OB/GYN HISTORY
[Menopause Age: ____] : patient was [unfilled] years old at menopause [___] : Total Pregnancies: [unfilled] [History of Birth Control Pills] : Patient has no history of taking birth control pills [History of Hormone Replacement Therapy] : no history of hormone replacement therapy

## 2021-01-05 PROCEDURE — 77332 RADIATION TREATMENT AID(S): CPT

## 2021-01-05 PROCEDURE — 99072 ADDL SUPL MATRL&STAF TM PHE: CPT

## 2021-01-05 PROCEDURE — 77263 THER RADIOLOGY TX PLNG CPLX: CPT

## 2021-01-07 PROCEDURE — 77301 RADIOTHERAPY DOSE PLAN IMRT: CPT

## 2021-01-07 PROCEDURE — 77338 DESIGN MLC DEVICE FOR IMRT: CPT

## 2021-01-07 PROCEDURE — 77300 RADIATION THERAPY DOSE PLAN: CPT

## 2021-01-08 PROCEDURE — 77331 SPECIAL RADIATION DOSIMETRY: CPT

## 2021-01-08 PROCEDURE — 99072 ADDL SUPL MATRL&STAF TM PHE: CPT

## 2021-01-14 PROCEDURE — G6002: CPT

## 2021-01-14 PROCEDURE — G6015: CPT

## 2021-01-14 PROCEDURE — 99072 ADDL SUPL MATRL&STAF TM PHE: CPT

## 2021-01-14 PROCEDURE — 77427 RADIATION TX MANAGEMENT X5: CPT

## 2021-01-15 PROCEDURE — G6002: CPT

## 2021-01-15 PROCEDURE — G6015: CPT

## 2021-01-19 ENCOUNTER — NON-APPOINTMENT (OUTPATIENT)
Age: 61
End: 2021-01-19

## 2021-01-19 VITALS
BODY MASS INDEX: 22.76 KG/M2 | HEART RATE: 80 BPM | HEIGHT: 67 IN | SYSTOLIC BLOOD PRESSURE: 130 MMHG | WEIGHT: 145 LBS | RESPIRATION RATE: 16 BRPM | DIASTOLIC BLOOD PRESSURE: 80 MMHG

## 2021-01-19 PROCEDURE — G6002: CPT

## 2021-01-19 PROCEDURE — G6015: CPT

## 2021-01-19 PROCEDURE — 99072 ADDL SUPL MATRL&STAF TM PHE: CPT

## 2021-01-19 NOTE — REVIEW OF SYSTEMS
[Hematuria: Grade 0] : Hematuria: Grade 0 [Urinary Incontinence: Grade 0] : Urinary Incontinence: Grade 0  [Urinary Retention: Grade 0] : Urinary Retention: Grade 0 [Urinary Tract Pain: Grade 1 - Mild pain] : Urinary Tract Pain: Grade 1 - Mild pain [Urinary Urgency: Grade 0] : Urinary Urgency: Grade 0 [Urinary Frequency: Grade 0] : Urinary Frequency: Grade 0 [Ejaculation Disorder: Grade 0] : Ejaculation Disorder: Grade 0 [Erectile Dysfunction: Grade 0] : Erectile Dysfunction: Grade 0

## 2021-01-19 NOTE — VITALS
[Maximal Pain Intensity: 1/10] : 1/10 [NoTreatment Scheduled] : no treatment scheduled [90: Able to carry normal activity; minor signs or symptoms of disease.] : 90: Able to carry normal activity; minor signs or symptoms of disease.

## 2021-01-19 NOTE — HISTORY OF PRESENT ILLNESS
[FreeTextEntry1] : The patient is 61 yo nulligravida postmenopausal female with locally recurrent endometrial cancer. \par She presented with postmenopausal bleeding in 12/2020. She had not seen a gynecologist in 27 years and has significant history of severe vulvodynia and resultant refusal of pelvic exams.\par She noted intermittent postmenopausal spotting for greater than 2 years. Occurred once in 2018, once in 2019 then returned in Jan 2020 and is now occurring almost every day. 2/25/2020- Pelvic US- TA views only showed uterus- 8.9 x 4 x 5.6 cm, the myometrium is homogenous. There was a left fundal leiomyoma measuring 3.9 x 2.5 x 4.4 cm, and another right-sided leiomyoma measures 2.3 x 2 x 2.7 cm EMS- 13 mm RTO/LTO- WNL\par 3/5/2020- Pelvic MRI revealed uterus- 9.1 x 5.2 x 3.4 cm, several leiomyomata including 2 pedunculated left- sided leiomyoma to measuring 3.2 x and 2.5 cm, respectively as well as a fundal subserosal leiomyoma measuring 2.8 cm EMS- 10 mm, thickened with minimal heterogenous enhancement RTO- WNL with a apparent pedunculated 2.5 cm simple cyst with proteinaceous material and/or chronic hemorrhage noted in retrospect on prior US 11/8/18.  LTO- WNL No pelvic lymphadenopathy.\par Diagnostic D&C done 6/12/2020 showed grade 2 endometrial cancer with clear cell features. Robotic TLH/BSO  done on 6/22/2020. Pathology revealed 3.5 cm endometrioid adenocarcinoma without myometrial invasion and 4 negative pelvic lymph nodes. The  pelvic washings were negative for malignant cells. MMR: loss of nuclear expression of MLH1/PMS2; methylation detected, indicative of sporadic mutation. No adjuvant therapy was indicated.\par \par She had a postop checkup due to complaints of vaginal spotting on 12/8/2020. A 3 cm vaginal cuff lesion was noted and biopsy was performed and a friable mass was seen. Pathology showed a clear cell carcinoma. She continues to have spotting and vaginal bleeding on a daily basis, intermittent vulvodynia. Very anxious about the diagnosis.She was deemed not a surgical candidate and presents today to discuss the role of radiation treatment in her care with her  on the telephone.\par \par She presents today for an OTV 3/28 fx. Reports slight dysuria. Anxious and not sleeping well. Using Melatonin.

## 2021-01-19 NOTE — DISEASE MANAGEMENT
[Pathological] : TNM Stage: p [IA] : IA [FreeTextEntry4] : recurrent [TTNM] : 1 [NTNM] : 0 [MTNM] : 0 [de-identified] : 033 [de-identified] : 3659 [de-identified] : Pelvis

## 2021-01-20 PROCEDURE — G6015: CPT

## 2021-01-20 PROCEDURE — G6002: CPT

## 2021-01-21 PROCEDURE — G6015: CPT

## 2021-01-21 PROCEDURE — 99072 ADDL SUPL MATRL&STAF TM PHE: CPT

## 2021-01-21 PROCEDURE — G6002: CPT

## 2021-01-22 PROCEDURE — G6015: CPT

## 2021-01-22 PROCEDURE — 99072 ADDL SUPL MATRL&STAF TM PHE: CPT

## 2021-01-22 PROCEDURE — 77427 RADIATION TX MANAGEMENT X5: CPT

## 2021-01-22 PROCEDURE — 77336 RADIATION PHYSICS CONSULT: CPT

## 2021-01-22 PROCEDURE — G6002: CPT

## 2021-01-25 PROCEDURE — G6002: CPT

## 2021-01-25 PROCEDURE — 99072 ADDL SUPL MATRL&STAF TM PHE: CPT

## 2021-01-25 PROCEDURE — G6015: CPT

## 2021-01-26 ENCOUNTER — NON-APPOINTMENT (OUTPATIENT)
Age: 61
End: 2021-01-26

## 2021-01-26 VITALS
DIASTOLIC BLOOD PRESSURE: 72 MMHG | SYSTOLIC BLOOD PRESSURE: 112 MMHG | HEART RATE: 88 BPM | RESPIRATION RATE: 18 BRPM | HEIGHT: 67 IN | WEIGHT: 145 LBS | BODY MASS INDEX: 22.76 KG/M2

## 2021-01-26 PROCEDURE — G6002: CPT

## 2021-01-26 PROCEDURE — G6015: CPT

## 2021-01-26 PROCEDURE — 99072 ADDL SUPL MATRL&STAF TM PHE: CPT

## 2021-01-26 NOTE — DISEASE MANAGEMENT
[Pathological] : TNM Stage: p [IA] : IA [FreeTextEntry4] : recurrent [TTNM] : 1 [NTNM] : 0 [MTNM] : 0 [de-identified] : 0593 [de-identified] : 0256 [de-identified] : Pelvis

## 2021-01-26 NOTE — HISTORY OF PRESENT ILLNESS
[FreeTextEntry1] : The patient is 61 yo nulligravida postmenopausal female with locally recurrent endometrial cancer. \par She presented with postmenopausal bleeding in 12/2020. She had not seen a gynecologist in 27 years and has significant history of severe vulvodynia and resultant refusal of pelvic exams.\par She noted intermittent postmenopausal spotting for greater than 2 years. Occurred once in 2018, once in 2019 then returned in Jan 2020 and is now occurring almost every day. 2/25/2020- Pelvic US- TA views only showed uterus- 8.9 x 4 x 5.6 cm, the myometrium is homogenous. There was a left fundal leiomyoma measuring 3.9 x 2.5 x 4.4 cm, and another right-sided leiomyoma measures 2.3 x 2 x 2.7 cm EMS- 13 mm RTO/LTO- WNL\par 3/5/2020- Pelvic MRI revealed uterus- 9.1 x 5.2 x 3.4 cm, several leiomyomata including 2 pedunculated left- sided leiomyoma to measuring 3.2 x and 2.5 cm, respectively as well as a fundal subserosal leiomyoma measuring 2.8 cm EMS- 10 mm, thickened with minimal heterogenous enhancement RTO- WNL with a apparent pedunculated 2.5 cm simple cyst with proteinaceous material and/or chronic hemorrhage noted in retrospect on prior US 11/8/18.  LTO- WNL No pelvic lymphadenopathy.\par Diagnostic D&C done 6/12/2020 showed grade 2 endometrial cancer with clear cell features. Robotic TLH/BSO  done on 6/22/2020. Pathology revealed 3.5 cm endometrioid adenocarcinoma without myometrial invasion and 4 negative pelvic lymph nodes. The  pelvic washings were negative for malignant cells. MMR: loss of nuclear expression of MLH1/PMS2; methylation detected, indicative of sporadic mutation. No adjuvant therapy was indicated.\par \par She had a postop checkup due to complaints of vaginal spotting on 12/8/2020. A 3 cm vaginal cuff lesion was noted and biopsy was performed and a friable mass was seen. Pathology showed a clear cell carcinoma. She continues to have spotting and vaginal bleeding on a daily basis, intermittent vulvodynia. Very anxious about the diagnosis.She was deemed not a surgical candidate and presents today to discuss the role of radiation treatment in her care with her  on the telephone.\par \par She presents today for an OTV 8/28 fx. Reports slight dysuria at times. Incontinence noted but as per patient she feels it may be improving. Anxious and not sleeping well. Using Melatonin with little relief. Not using pyrimidine as not necessary. Denies GI or skin complaints.

## 2021-01-27 PROCEDURE — G6015: CPT

## 2021-01-27 PROCEDURE — G6002: CPT

## 2021-01-27 PROCEDURE — 99072 ADDL SUPL MATRL&STAF TM PHE: CPT

## 2021-01-28 PROCEDURE — G6002: CPT

## 2021-01-28 PROCEDURE — G6015: CPT

## 2021-01-29 PROCEDURE — G6002: CPT

## 2021-01-29 PROCEDURE — G6015: CPT

## 2021-01-29 PROCEDURE — 77336 RADIATION PHYSICS CONSULT: CPT

## 2021-01-29 PROCEDURE — 77427 RADIATION TX MANAGEMENT X5: CPT

## 2021-01-29 PROCEDURE — 99072 ADDL SUPL MATRL&STAF TM PHE: CPT

## 2021-02-02 ENCOUNTER — NON-APPOINTMENT (OUTPATIENT)
Age: 61
End: 2021-02-02

## 2021-02-02 VITALS
HEIGHT: 67 IN | WEIGHT: 144 LBS | DIASTOLIC BLOOD PRESSURE: 60 MMHG | SYSTOLIC BLOOD PRESSURE: 115 MMHG | RESPIRATION RATE: 16 BRPM | BODY MASS INDEX: 22.6 KG/M2 | HEART RATE: 74 BPM

## 2021-02-02 PROCEDURE — G6002: CPT

## 2021-02-02 PROCEDURE — G6015: CPT

## 2021-02-02 PROCEDURE — 99072 ADDL SUPL MATRL&STAF TM PHE: CPT

## 2021-02-02 NOTE — DISEASE MANAGEMENT
[Pathological] : TNM Stage: p [IA] : IA [FreeTextEntry4] : recurrent [TTNM] : 1 [NTNM] : 0 [MTNM] : 0 [de-identified] : 6721 [de-identified] : 8157 [de-identified] : Pelvis

## 2021-02-02 NOTE — REVIEW OF SYSTEMS
[Genital Edema: Grade 0] : Genital Edema: Grade 0 [Vaginal Stricture: Grade 0] : Vaginal Stricture: Grade 0 [Vaginal Infection: Grade 0] : Vaginal Infection: Grade 0

## 2021-02-02 NOTE — HISTORY OF PRESENT ILLNESS
[FreeTextEntry1] : The patient is 61 yo nulligravida postmenopausal female with locally recurrent endometrial cancer. \par She presented with postmenopausal bleeding in 12/2020. She had not seen a gynecologist in 27 years and has significant history of severe vulvodynia and resultant refusal of pelvic exams.\par She noted intermittent postmenopausal spotting for greater than 2 years. Occurred once in 2018, once in 2019 then returned in Jan 2020 and is now occurring almost every day. 2/25/2020- Pelvic US- TA views only showed uterus- 8.9 x 4 x 5.6 cm, the myometrium is homogenous. There was a left fundal leiomyoma measuring 3.9 x 2.5 x 4.4 cm, and another right-sided leiomyoma measures 2.3 x 2 x 2.7 cm EMS- 13 mm RTO/LTO- WNL\par 3/5/2020- Pelvic MRI revealed uterus- 9.1 x 5.2 x 3.4 cm, several leiomyomata including 2 pedunculated left- sided leiomyoma to measuring 3.2 x and 2.5 cm, respectively as well as a fundal subserosal leiomyoma measuring 2.8 cm EMS- 10 mm, thickened with minimal heterogenous enhancement RTO- WNL with a apparent pedunculated 2.5 cm simple cyst with proteinaceous material and/or chronic hemorrhage noted in retrospect on prior US 11/8/18.  LTO- WNL No pelvic lymphadenopathy.\par Diagnostic D&C done 6/12/2020 showed grade 2 endometrial cancer with clear cell features. Robotic TLH/BSO  done on 6/22/2020. Pathology revealed 3.5 cm endometrioid adenocarcinoma without myometrial invasion and 4 negative pelvic lymph nodes. The  pelvic washings were negative for malignant cells. MMR: loss of nuclear expression of MLH1/PMS2; methylation detected, indicative of sporadic mutation. No adjuvant therapy was indicated.\par \par She had a postop checkup due to complaints of vaginal spotting on 12/8/2020. A 3 cm vaginal cuff lesion was noted and biopsy was performed and a friable mass was seen. Pathology showed a clear cell carcinoma. She continues to have spotting and vaginal bleeding on a daily basis, intermittent vulvodynia. Very anxious about the diagnosis.She was deemed not a surgical candidate and presents today to discuss the role of radiation treatment in her care with her  on the telephone.\par \par She presents today for an OTV 12/28 fx. She reports mild skin pruritus in vulva. + hemmorhoids. Nausea when eats too much fiber.  + mucus vaginal dc. No diarrhea or cramps. Mild dysuria and has not needed to take pyridium.

## 2021-02-03 PROCEDURE — G6015: CPT

## 2021-02-03 PROCEDURE — 99072 ADDL SUPL MATRL&STAF TM PHE: CPT

## 2021-02-03 PROCEDURE — G6002: CPT

## 2021-02-04 PROCEDURE — G6015: CPT

## 2021-02-04 PROCEDURE — G6002: CPT

## 2021-02-04 PROCEDURE — 99072 ADDL SUPL MATRL&STAF TM PHE: CPT

## 2021-02-05 PROCEDURE — 99072 ADDL SUPL MATRL&STAF TM PHE: CPT

## 2021-02-05 PROCEDURE — G6002: CPT

## 2021-02-05 PROCEDURE — G6015: CPT

## 2021-02-08 PROCEDURE — 99072 ADDL SUPL MATRL&STAF TM PHE: CPT

## 2021-02-08 PROCEDURE — G6002: CPT

## 2021-02-08 PROCEDURE — G6015: CPT

## 2021-02-08 PROCEDURE — 77427 RADIATION TX MANAGEMENT X5: CPT

## 2021-02-08 PROCEDURE — 77336 RADIATION PHYSICS CONSULT: CPT

## 2021-02-09 ENCOUNTER — NON-APPOINTMENT (OUTPATIENT)
Age: 61
End: 2021-02-09

## 2021-02-09 VITALS
SYSTOLIC BLOOD PRESSURE: 110 MMHG | HEART RATE: 74 BPM | WEIGHT: 143 LBS | BODY MASS INDEX: 22.44 KG/M2 | RESPIRATION RATE: 18 BRPM | DIASTOLIC BLOOD PRESSURE: 74 MMHG | HEIGHT: 67 IN

## 2021-02-09 PROCEDURE — 99072 ADDL SUPL MATRL&STAF TM PHE: CPT

## 2021-02-09 PROCEDURE — 36415 COLL VENOUS BLD VENIPUNCTURE: CPT

## 2021-02-09 PROCEDURE — G6015: CPT

## 2021-02-09 PROCEDURE — G6002: CPT

## 2021-02-09 NOTE — PHYSICAL EXAM
[Normal] : normoactive bowel sounds, soft and nontender, no hepatosplenomegaly or masses appreciated [Normal] : normal external genitalia [Normal External Genitalia] : normal external genitalia

## 2021-02-09 NOTE — HISTORY OF PRESENT ILLNESS
[FreeTextEntry1] : The patient is 59 yo nulligravida postmenopausal female with locally recurrent endometrial cancer. \par She presented with postmenopausal bleeding in 12/2020. She had not seen a gynecologist in 27 years and has significant history of severe vulvodynia and resultant refusal of pelvic exams.\par She noted intermittent postmenopausal spotting for greater than 2 years. Occurred once in 2018, once in 2019 then returned in Jan 2020 and is now occurring almost every day. 2/25/2020- Pelvic US- TA views only showed uterus- 8.9 x 4 x 5.6 cm, the myometrium is homogenous. There was a left fundal leiomyoma measuring 3.9 x 2.5 x 4.4 cm, and another right-sided leiomyoma measures 2.3 x 2 x 2.7 cm EMS- 13 mm RTO/LTO- WNL\par 3/5/2020- Pelvic MRI revealed uterus- 9.1 x 5.2 x 3.4 cm, several leiomyomata including 2 pedunculated left- sided leiomyoma to measuring 3.2 x and 2.5 cm, respectively as well as a fundal subserosal leiomyoma measuring 2.8 cm EMS- 10 mm, thickened with minimal heterogenous enhancement RTO- WNL with a apparent pedunculated 2.5 cm simple cyst with proteinaceous material and/or chronic hemorrhage noted in retrospect on prior US 11/8/18.  LTO- WNL No pelvic lymphadenopathy.\par Diagnostic D&C done 6/12/2020 showed grade 2 endometrial cancer with clear cell features. Robotic TLH/BSO  done on 6/22/2020. Pathology revealed 3.5 cm endometrioid adenocarcinoma without myometrial invasion and 4 negative pelvic lymph nodes. The  pelvic washings were negative for malignant cells. MMR: loss of nuclear expression of MLH1/PMS2; methylation detected, indicative of sporadic mutation. No adjuvant therapy was indicated.\par \par She had a postop checkup due to complaints of vaginal spotting on 12/8/2020. A 3 cm vaginal cuff lesion was noted and biopsy was performed and a friable mass was seen. Pathology showed a clear cell carcinoma. She continues to have spotting and vaginal bleeding on a daily basis, intermittent vulvodynia. Very anxious about the diagnosis.She was deemed not a surgical candidate and presents today to discuss the role of radiation treatment in her care with her  on the telephone.\par \par She presents today for an OTV 17/28 fx. She reports continued mild skin pruritus in vulva. + hemmorhoids. Nausea when eats too much fiber.  + mucus vaginal dc. No diarrhea or cramps. Mild dysuria and has not needed to take pyridium. Slight vaginal bleeding has subsided.

## 2021-02-09 NOTE — DISEASE MANAGEMENT
[Pathological] : TNM Stage: p [IA] : IA [FreeTextEntry4] : recurrent [TTNM] : 1 [NTNM] : 0 [MTNM] : 0 [de-identified] : 6661 [de-identified] : 9276 [de-identified] : Pelvis

## 2021-02-10 PROCEDURE — G6015: CPT

## 2021-02-10 PROCEDURE — G6002: CPT

## 2021-02-11 PROCEDURE — G6002: CPT

## 2021-02-11 PROCEDURE — 99072 ADDL SUPL MATRL&STAF TM PHE: CPT

## 2021-02-11 PROCEDURE — G6015: CPT

## 2021-02-12 PROCEDURE — G6015: CPT

## 2021-02-12 PROCEDURE — G6002: CPT

## 2021-02-12 PROCEDURE — 99072 ADDL SUPL MATRL&STAF TM PHE: CPT

## 2021-02-16 ENCOUNTER — NON-APPOINTMENT (OUTPATIENT)
Age: 61
End: 2021-02-16

## 2021-02-16 VITALS
SYSTOLIC BLOOD PRESSURE: 110 MMHG | RESPIRATION RATE: 18 BRPM | BODY MASS INDEX: 22.44 KG/M2 | HEIGHT: 67 IN | DIASTOLIC BLOOD PRESSURE: 74 MMHG | HEART RATE: 74 BPM | WEIGHT: 143 LBS

## 2021-02-16 PROCEDURE — G6002: CPT

## 2021-02-16 PROCEDURE — G6015: CPT

## 2021-02-16 PROCEDURE — 77427 RADIATION TX MANAGEMENT X5: CPT

## 2021-02-16 PROCEDURE — 77336 RADIATION PHYSICS CONSULT: CPT

## 2021-02-16 PROCEDURE — 99072 ADDL SUPL MATRL&STAF TM PHE: CPT

## 2021-02-16 NOTE — DISEASE MANAGEMENT
[Pathological] : TNM Stage: p [IA] : IA [FreeTextEntry4] : recurrent [TTNM] : 1 [NTNM] : 0 [MTNM] : 0 [de-identified] : 2420 [de-identified] : 5448 [de-identified] : Pelvis

## 2021-02-16 NOTE — PHYSICAL EXAM
[Normal] : normoactive bowel sounds, soft and nontender, no hepatosplenomegaly or masses appreciated [Normal] : normal external genitalia [Normal External Genitalia] : normal external genitalia  [de-identified] : no erythema of the vulva. faint erythema vagina without desquamation

## 2021-02-16 NOTE — REVIEW OF SYSTEMS
[Diarrhea: Grade 1 - Increase of <4 stools per day over baseline; mild increase in ostomy output compared to baseline] : Diarrhea: Grade 1 - Increase of <4 stools per day over baseline; mild increase in ostomy output compared to baseline [Fatigue: Grade 1 - Fatigue relieved by rest] : Fatigue: Grade 1 - Fatigue relieved by rest [Urinary Tract Pain: Grade 1 - Mild pain] : Urinary Tract Pain: Grade 1 - Mild pain [Pruritus: Grade 1 - Mild or localized; topical intervention indicated] : Pruritus: Grade 1 - Mild or localized; topical intervention indicated

## 2021-02-16 NOTE — HISTORY OF PRESENT ILLNESS
[FreeTextEntry1] : The patient is 61 yo nulligravida postmenopausal female with locally recurrent endometrial cancer. \par She presented with postmenopausal bleeding in 12/2020. She had not seen a gynecologist in 27 years and has significant history of severe vulvodynia and resultant refusal of pelvic exams.\par She noted intermittent postmenopausal spotting for greater than 2 years. Occurred once in 2018, once in 2019 then returned in Jan 2020 and is now occurring almost every day. 2/25/2020- Pelvic US- TA views only showed uterus- 8.9 x 4 x 5.6 cm, the myometrium is homogenous. There was a left fundal leiomyoma measuring 3.9 x 2.5 x 4.4 cm, and another right-sided leiomyoma measures 2.3 x 2 x 2.7 cm EMS- 13 mm RTO/LTO- WNL\par 3/5/2020- Pelvic MRI revealed uterus- 9.1 x 5.2 x 3.4 cm, several leiomyomata including 2 pedunculated left- sided leiomyoma to measuring 3.2 x and 2.5 cm, respectively as well as a fundal subserosal leiomyoma measuring 2.8 cm EMS- 10 mm, thickened with minimal heterogenous enhancement RTO- WNL with a apparent pedunculated 2.5 cm simple cyst with proteinaceous material and/or chronic hemorrhage noted in retrospect on prior US 11/8/18.  LTO- WNL No pelvic lymphadenopathy.\par Diagnostic D&C done 6/12/2020 showed grade 2 endometrial cancer with clear cell features. Robotic TLH/BSO  done on 6/22/2020. Pathology revealed 3.5 cm endometrioid adenocarcinoma without myometrial invasion and 4 negative pelvic lymph nodes. The  pelvic washings were negative for malignant cells. MMR: loss of nuclear expression of MLH1/PMS2; methylation detected, indicative of sporadic mutation. No adjuvant therapy was indicated.\par \par She had a postop checkup due to complaints of vaginal spotting on 12/8/2020. A 3 cm vaginal cuff lesion was noted and biopsy was performed and a friable mass was seen. Pathology showed a clear cell carcinoma. She continues to have spotting and vaginal bleeding on a daily basis, intermittent vulvodynia. Very anxious about the diagnosis.She was deemed not a surgical candidate and presents today to discuss the role of radiation treatment in her care with her  on the telephone.\par \par She presents today for an OTV 21/28 fx. She reports continued mild skin pruritus in vulva. + hemmorhoids. Nausea when eats too much fiber.  + mucus vaginal dc. Loose stools x3 noted yesterday. Mild dysuria and has not needed to take pyridium. Slight vaginal bleeding has subsided.

## 2021-02-17 PROCEDURE — G6015: CPT

## 2021-02-17 PROCEDURE — G6002: CPT

## 2021-02-18 PROCEDURE — G6015: CPT

## 2021-02-18 PROCEDURE — 99072 ADDL SUPL MATRL&STAF TM PHE: CPT

## 2021-02-18 PROCEDURE — G6002: CPT

## 2021-02-22 PROCEDURE — G6002: CPT

## 2021-02-22 PROCEDURE — G6015: CPT

## 2021-02-22 PROCEDURE — 99072 ADDL SUPL MATRL&STAF TM PHE: CPT

## 2021-02-22 PROCEDURE — 36415 COLL VENOUS BLD VENIPUNCTURE: CPT

## 2021-02-23 ENCOUNTER — NON-APPOINTMENT (OUTPATIENT)
Age: 61
End: 2021-02-23

## 2021-02-23 VITALS
SYSTOLIC BLOOD PRESSURE: 102 MMHG | HEART RATE: 72 BPM | DIASTOLIC BLOOD PRESSURE: 70 MMHG | HEIGHT: 67 IN | WEIGHT: 142 LBS | BODY MASS INDEX: 22.29 KG/M2 | RESPIRATION RATE: 18 BRPM

## 2021-02-23 PROCEDURE — 99072 ADDL SUPL MATRL&STAF TM PHE: CPT

## 2021-02-23 PROCEDURE — G6002: CPT

## 2021-02-23 PROCEDURE — G6015: CPT

## 2021-02-23 NOTE — REVIEW OF SYSTEMS
[Diarrhea: Grade 1 - Increase of <4 stools per day over baseline; mild increase in ostomy output compared to baseline] : Diarrhea: Grade 1 - Increase of <4 stools per day over baseline; mild increase in ostomy output compared to baseline [Fatigue: Grade 1 - Fatigue relieved by rest] : Fatigue: Grade 1 - Fatigue relieved by rest [Urinary Tract Pain: Grade 1 - Mild pain] : Urinary Tract Pain: Grade 1 - Mild pain [Urinary Frequency: Grade 1 - Present] : Urinary Frequency: Grade 1 - Present

## 2021-02-23 NOTE — PHYSICAL EXAM
[Normal] : well developed, well nourished, in no acute distress [Normal] : normal external genitalia [Normal External Genitalia] : normal external genitalia  [de-identified] : no erythema of the vulva. faint erythema vagina without desquamation

## 2021-02-23 NOTE — VITALS
[90: Able to carry normal activity; minor signs or symptoms of disease.] : 90: Able to carry normal activity; minor signs or symptoms of disease.  [Maximal Pain Intensity: 1/10] : 1/10

## 2021-02-23 NOTE — DISEASE MANAGEMENT
[Pathological] : TNM Stage: p [IA] : IA [FreeTextEntry4] : recurrent [TTNM] : 1 [NTNM] : 0 [MTNM] : 0 [de-identified] : 3759 [de-identified] : 9007 [de-identified] : Pelvis

## 2021-02-23 NOTE — HISTORY OF PRESENT ILLNESS
[FreeTextEntry1] : The patient is 61 yo nulligravida postmenopausal female with locally recurrent endometrial cancer. \par She presented with postmenopausal bleeding in 12/2020. She had not seen a gynecologist in 27 years and has significant history of severe vulvodynia and resultant refusal of pelvic exams.\par She noted intermittent postmenopausal spotting for greater than 2 years. Occurred once in 2018, once in 2019 then returned in Jan 2020 and is now occurring almost every day. 2/25/2020- Pelvic US- TA views only showed uterus- 8.9 x 4 x 5.6 cm, the myometrium is homogenous. There was a left fundal leiomyoma measuring 3.9 x 2.5 x 4.4 cm, and another right-sided leiomyoma measures 2.3 x 2 x 2.7 cm EMS- 13 mm RTO/LTO- WNL\par 3/5/2020- Pelvic MRI revealed uterus- 9.1 x 5.2 x 3.4 cm, several leiomyomata including 2 pedunculated left- sided leiomyoma to measuring 3.2 x and 2.5 cm, respectively as well as a fundal subserosal leiomyoma measuring 2.8 cm EMS- 10 mm, thickened with minimal heterogenous enhancement RTO- WNL with a apparent pedunculated 2.5 cm simple cyst with proteinaceous material and/or chronic hemorrhage noted in retrospect on prior US 11/8/18.  LTO- WNL No pelvic lymphadenopathy.\par Diagnostic D&C done 6/12/2020 showed grade 2 endometrial cancer with clear cell features. Robotic TLH/BSO  done on 6/22/2020. Pathology revealed 3.5 cm endometrioid adenocarcinoma without myometrial invasion and 4 negative pelvic lymph nodes. The  pelvic washings were negative for malignant cells. MMR: loss of nuclear expression of MLH1/PMS2; methylation detected, indicative of sporadic mutation. No adjuvant therapy was indicated.\par \par She had a postop checkup due to complaints of vaginal spotting on 12/8/2020. A 3 cm vaginal cuff lesion was noted and biopsy was performed and a friable mass was seen. Pathology showed a clear cell carcinoma. She continues to have spotting and vaginal bleeding on a daily basis, intermittent vulvodynia. Very anxious about the diagnosis.She was deemed not a surgical candidate and presents today to discuss the role of radiation treatment in her care with her  on the telephone.\par \par She presents today for an OTV 25/28 fx. She reports continued mild skin pruritus in vulva. + hemmorhoids. Itchy more prominent at night. Nausea when eats too much fiber.  + mucus vaginal dc. Loose stools x3 noted yesterday. Mild dysuria and has not needed to take pyridium. Slight vaginal bleeding has subsided. CBC drawn yesterday.

## 2021-02-24 PROCEDURE — 99072 ADDL SUPL MATRL&STAF TM PHE: CPT

## 2021-02-24 PROCEDURE — G6002: CPT

## 2021-02-24 PROCEDURE — 77336 RADIATION PHYSICS CONSULT: CPT

## 2021-02-24 PROCEDURE — G6015: CPT

## 2021-02-25 PROCEDURE — G6015: CPT

## 2021-02-25 PROCEDURE — 99072 ADDL SUPL MATRL&STAF TM PHE: CPT

## 2021-02-25 PROCEDURE — G6002: CPT

## 2021-02-26 LAB
BASOPHILS # BLD AUTO: 0.04 K/UL
BASOPHILS # BLD AUTO: 0.04 K/UL
BASOPHILS NFR BLD AUTO: 0.4 %
BASOPHILS NFR BLD AUTO: 0.6 %
EOSINOPHIL # BLD AUTO: 0.09 K/UL
EOSINOPHIL # BLD AUTO: 0.11 K/UL
EOSINOPHIL NFR BLD AUTO: 1 %
EOSINOPHIL NFR BLD AUTO: 1.5 %
HCT VFR BLD CALC: 41.5 %
HCT VFR BLD CALC: 41.9 %
HGB BLD-MCNC: 13.5 G/DL
HGB BLD-MCNC: 13.6 G/DL
IMM GRANULOCYTES NFR BLD AUTO: 0.8 %
IMM GRANULOCYTES NFR BLD AUTO: 1 %
LYMPHOCYTES # BLD AUTO: 0.52 K/UL
LYMPHOCYTES # BLD AUTO: 0.56 K/UL
LYMPHOCYTES NFR BLD AUTO: 6.3 %
LYMPHOCYTES NFR BLD AUTO: 7.2 %
MAN DIFF?: NORMAL
MAN DIFF?: NORMAL
MCHC RBC-ENTMCNC: 30.6 PG
MCHC RBC-ENTMCNC: 31 PG
MCHC RBC-ENTMCNC: 32.5 GM/DL
MCHC RBC-ENTMCNC: 32.5 GM/DL
MCV RBC AUTO: 94.2 FL
MCV RBC AUTO: 95.4 FL
MONOCYTES # BLD AUTO: 0.69 K/UL
MONOCYTES # BLD AUTO: 0.72 K/UL
MONOCYTES NFR BLD AUTO: 8.1 %
MONOCYTES NFR BLD AUTO: 9.6 %
NEUTROPHILS # BLD AUTO: 5.79 K/UL
NEUTROPHILS # BLD AUTO: 7.43 K/UL
NEUTROPHILS NFR BLD AUTO: 80.1 %
NEUTROPHILS NFR BLD AUTO: 83.4 %
PLATELET # BLD AUTO: 234 K/UL
PLATELET # BLD AUTO: 319 K/UL
RBC # BLD: 4.35 M/UL
RBC # BLD: 4.45 M/UL
RBC # FLD: 13.7 %
RBC # FLD: 14.2 %
WBC # FLD AUTO: 7.22 K/UL
WBC # FLD AUTO: 8.91 K/UL

## 2021-02-26 PROCEDURE — G6002: CPT

## 2021-02-26 PROCEDURE — G6015: CPT

## 2021-02-26 PROCEDURE — 99072 ADDL SUPL MATRL&STAF TM PHE: CPT

## 2021-03-02 ENCOUNTER — NON-APPOINTMENT (OUTPATIENT)
Age: 61
End: 2021-03-02

## 2021-03-10 PROCEDURE — 99072 ADDL SUPL MATRL&STAF TM PHE: CPT

## 2021-03-10 PROCEDURE — 77263 THER RADIOLOGY TX PLNG CPLX: CPT

## 2021-03-10 PROCEDURE — 77332 RADIATION TREATMENT AID(S): CPT

## 2021-03-15 PROCEDURE — 36415 COLL VENOUS BLD VENIPUNCTURE: CPT

## 2021-03-15 PROCEDURE — 99072 ADDL SUPL MATRL&STAF TM PHE: CPT

## 2021-03-16 LAB
BASOPHILS # BLD AUTO: 0.06 K/UL
BASOPHILS NFR BLD AUTO: 1 %
EOSINOPHIL # BLD AUTO: 0.16 K/UL
EOSINOPHIL NFR BLD AUTO: 2.6 %
HCT VFR BLD CALC: 42.9 %
HGB BLD-MCNC: 14 G/DL
IMM GRANULOCYTES NFR BLD AUTO: 0.5 %
LYMPHOCYTES # BLD AUTO: 0.57 K/UL
LYMPHOCYTES NFR BLD AUTO: 9.3 %
MAN DIFF?: NORMAL
MCHC RBC-ENTMCNC: 31.7 PG
MCHC RBC-ENTMCNC: 32.6 GM/DL
MCV RBC AUTO: 97.1 FL
MONOCYTES # BLD AUTO: 0.45 K/UL
MONOCYTES NFR BLD AUTO: 7.3 %
NEUTROPHILS # BLD AUTO: 4.89 K/UL
NEUTROPHILS NFR BLD AUTO: 79.3 %
PLATELET # BLD AUTO: 272 K/UL
RBC # BLD: 4.42 M/UL
RBC # FLD: 14.7 %
WBC # FLD AUTO: 6.16 K/UL

## 2021-03-17 ENCOUNTER — APPOINTMENT (OUTPATIENT)
Dept: CT IMAGING | Facility: CLINIC | Age: 61
End: 2021-03-17

## 2021-03-18 ENCOUNTER — APPOINTMENT (OUTPATIENT)
Dept: MRI IMAGING | Facility: CLINIC | Age: 61
End: 2021-03-18
Payer: COMMERCIAL

## 2021-03-18 ENCOUNTER — APPOINTMENT (OUTPATIENT)
Dept: GYNECOLOGIC ONCOLOGY | Facility: CLINIC | Age: 61
End: 2021-03-18
Payer: COMMERCIAL

## 2021-03-18 VITALS
HEART RATE: 114 BPM | HEIGHT: 67 IN | DIASTOLIC BLOOD PRESSURE: 87 MMHG | WEIGHT: 138 LBS | BODY MASS INDEX: 21.66 KG/M2 | SYSTOLIC BLOOD PRESSURE: 127 MMHG

## 2021-03-18 PROCEDURE — 99213 OFFICE O/P EST LOW 20 MIN: CPT

## 2021-03-18 PROCEDURE — 99072 ADDL SUPL MATRL&STAF TM PHE: CPT

## 2021-03-23 ENCOUNTER — OUTPATIENT (OUTPATIENT)
Dept: OUTPATIENT SERVICES | Facility: HOSPITAL | Age: 61
LOS: 1 days | End: 2021-03-23
Payer: COMMERCIAL

## 2021-03-23 ENCOUNTER — APPOINTMENT (OUTPATIENT)
Dept: MRI IMAGING | Facility: CLINIC | Age: 61
End: 2021-03-23
Payer: COMMERCIAL

## 2021-03-23 DIAGNOSIS — N90.7 VULVAR CYST: Chronic | ICD-10-CM

## 2021-03-23 DIAGNOSIS — C54.1 MALIGNANT NEOPLASM OF ENDOMETRIUM: ICD-10-CM

## 2021-03-23 DIAGNOSIS — Z98.890 OTHER SPECIFIED POSTPROCEDURAL STATES: Chronic | ICD-10-CM

## 2021-03-23 PROCEDURE — 72197 MRI PELVIS W/O & W/DYE: CPT | Mod: 26

## 2021-03-23 PROCEDURE — A9585: CPT

## 2021-03-23 PROCEDURE — 72197 MRI PELVIS W/O & W/DYE: CPT

## 2021-03-24 ENCOUNTER — OUTPATIENT (OUTPATIENT)
Dept: OUTPATIENT SERVICES | Facility: HOSPITAL | Age: 61
LOS: 1 days | Discharge: HOME | End: 2021-03-24

## 2021-03-24 ENCOUNTER — APPOINTMENT (OUTPATIENT)
Dept: CT IMAGING | Facility: CLINIC | Age: 61
End: 2021-03-24
Payer: COMMERCIAL

## 2021-03-24 ENCOUNTER — RESULT REVIEW (OUTPATIENT)
Age: 61
End: 2021-03-24

## 2021-03-24 ENCOUNTER — OUTPATIENT (OUTPATIENT)
Dept: OUTPATIENT SERVICES | Facility: HOSPITAL | Age: 61
LOS: 1 days | End: 2021-03-24
Payer: COMMERCIAL

## 2021-03-24 DIAGNOSIS — Z98.890 OTHER SPECIFIED POSTPROCEDURAL STATES: Chronic | ICD-10-CM

## 2021-03-24 DIAGNOSIS — C54.1 MALIGNANT NEOPLASM OF ENDOMETRIUM: ICD-10-CM

## 2021-03-24 DIAGNOSIS — N90.7 VULVAR CYST: Chronic | ICD-10-CM

## 2021-03-24 DIAGNOSIS — C44.40 UNSPECIFIED MALIGNANT NEOPLASM OF SKIN OF SCALP AND NECK: ICD-10-CM

## 2021-03-24 DIAGNOSIS — Z00.8 ENCOUNTER FOR OTHER GENERAL EXAMINATION: ICD-10-CM

## 2021-03-24 PROCEDURE — 71260 CT THORAX DX C+: CPT

## 2021-03-24 PROCEDURE — 71260 CT THORAX DX C+: CPT | Mod: 26

## 2021-03-25 ENCOUNTER — OUTPATIENT (OUTPATIENT)
Dept: OUTPATIENT SERVICES | Facility: HOSPITAL | Age: 61
LOS: 1 days | Discharge: HOME | End: 2021-03-25
Payer: COMMERCIAL

## 2021-03-25 DIAGNOSIS — Z98.890 OTHER SPECIFIED POSTPROCEDURAL STATES: Chronic | ICD-10-CM

## 2021-03-25 DIAGNOSIS — N90.7 VULVAR CYST: Chronic | ICD-10-CM

## 2021-03-25 DIAGNOSIS — N93.9 ABNORMAL UTERINE AND VAGINAL BLEEDING, UNSPECIFIED: ICD-10-CM

## 2021-03-26 ENCOUNTER — APPOINTMENT (OUTPATIENT)
Dept: RADIATION ONCOLOGY | Facility: CLINIC | Age: 61
End: 2021-03-26

## 2021-03-29 ENCOUNTER — RESULT REVIEW (OUTPATIENT)
Age: 61
End: 2021-03-29

## 2021-03-29 ENCOUNTER — APPOINTMENT (OUTPATIENT)
Dept: HEMATOLOGY ONCOLOGY | Facility: CLINIC | Age: 61
End: 2021-03-29
Payer: COMMERCIAL

## 2021-03-29 VITALS
HEIGHT: 67 IN | HEART RATE: 95 BPM | DIASTOLIC BLOOD PRESSURE: 77 MMHG | SYSTOLIC BLOOD PRESSURE: 116 MMHG | WEIGHT: 138.13 LBS | OXYGEN SATURATION: 98 % | BODY MASS INDEX: 21.68 KG/M2

## 2021-03-29 LAB
BASOPHILS # BLD AUTO: 0.1 K/UL — SIGNIFICANT CHANGE UP (ref 0–0.2)
BASOPHILS NFR BLD AUTO: 0.8 % — SIGNIFICANT CHANGE UP (ref 0–2)
EOSINOPHIL # BLD AUTO: 0.1 K/UL — SIGNIFICANT CHANGE UP (ref 0–0.5)
EOSINOPHIL NFR BLD AUTO: 1.1 % — SIGNIFICANT CHANGE UP (ref 0–6)
HCT VFR BLD CALC: 45.5 % — HIGH (ref 34.5–45)
HGB BLD-MCNC: 14.8 G/DL — SIGNIFICANT CHANGE UP (ref 11.5–15.5)
LYMPHOCYTES # BLD AUTO: 0.6 K/UL — LOW (ref 1–3.3)
LYMPHOCYTES # BLD AUTO: 6.7 % — LOW (ref 13–44)
MCHC RBC-ENTMCNC: 31.4 PG — SIGNIFICANT CHANGE UP (ref 27–34)
MCHC RBC-ENTMCNC: 32.4 G/DL — SIGNIFICANT CHANGE UP (ref 32–36)
MCV RBC AUTO: 96.7 FL — SIGNIFICANT CHANGE UP (ref 80–100)
MONOCYTES # BLD AUTO: 0.5 K/UL — SIGNIFICANT CHANGE UP (ref 0–0.9)
MONOCYTES NFR BLD AUTO: 6.2 % — SIGNIFICANT CHANGE UP (ref 2–14)
NEUTROPHILS # BLD AUTO: 7 K/UL — SIGNIFICANT CHANGE UP (ref 1.8–7.4)
NEUTROPHILS NFR BLD AUTO: 85.2 % — HIGH (ref 43–77)
PLATELET # BLD AUTO: 301 K/UL — SIGNIFICANT CHANGE UP (ref 150–400)
RBC # BLD: 4.7 M/UL — SIGNIFICANT CHANGE UP (ref 3.8–5.2)
RBC # FLD: 12.8 % — SIGNIFICANT CHANGE UP (ref 10.3–14.5)
WBC # BLD: 8.3 K/UL — SIGNIFICANT CHANGE UP (ref 3.8–10.5)
WBC # FLD AUTO: 8.3 K/UL — SIGNIFICANT CHANGE UP (ref 3.8–10.5)

## 2021-03-29 PROCEDURE — 99215 OFFICE O/P EST HI 40 MIN: CPT

## 2021-03-29 PROCEDURE — 99072 ADDL SUPL MATRL&STAF TM PHE: CPT

## 2021-03-29 NOTE — HISTORY OF PRESENT ILLNESS
[de-identified] : The patient was diagnosed with endometrial cancer in June 2020 at the age of 60.  She presented to GYN with postmenopausal bleeding in 12/2019. She had not seen a gynecologist in 27 years and had significant history of severe vulvodynia and resultant refusal of pelvic exams. She noted intermittent postmenopausal spotting for greater than 2 years. Occurred once in 2018, once in 2019 then returned in Jan 2020 almost every day.  2/25/20 Pelvic US:  uterus- left fundal leiomyoma measuring 3.9 x 2.5 x 4.4 cm, and another right-sided leiomyoma measures 2.3 x 2 x 2.7 cm.  3/5/2020- Pelvic MRI:   uterus-  several leiomyomata including 2 pedunculated left- sided leiomyoma measuring 3.2 x and 2.5 cm,  as well as a fundal subserosal leiomyoma measuring 2.8 cm, with an apparent pedunculated 2.5 cm.  No pelvic lymphadenopathy.  D&C 6/12/20:  grade 2 endometrial cancer with clear cell features. A robotic TLH/BSO on 6/22/20 revealed a 3.5 cm endometrioid adenocarcinoma without myometrial invasion and 4 negative pelvic lymph nodes. The pelvic washings were negative for malignant cells. MMR: loss of nuclear expression of MLH1/PMS2; methylation detected, indicative of sporadic mutation. No adjuvant therapy was indicated.  She had a postop checkup due to complaints of vaginal spotting on 12/8/2020. A 3 cm vaginal cuff lesion was noted and biopsy was performed and a friable mass was seen. Pathology showed a clear cell carcinoma.  She was deemed not a surgical candidate and is s/p EBRT with Dr. Katherine Dickerson with plans for brachytherapy as well.    [de-identified] : PMH:  She has significant history of anxiety and severe vulvodynia and resultant refusal of pelvic exams as well as sexual dysfunction.\par PSH: lumpectomy, multiple vulvar surgeries due to vulvodynia (Bartholin gland, hymenal tissue/scar tissue removed) under care of Dr. Jefferson Downs at [place]. \par FH:: father- mesothelioma, brother- bladder cancer, maternal grandmother with breast and colon cancer, maternal great aunt with breast, maternal second cousin with breast cancer.\par HM:  Mammo/ SBE/ CBE- followed by Dr. Briscoe (diffuse cystic mastopathy) - [facility] 9/2020 BIRADS2\par EGD- 10/2016- hiatal hernia\par Colonoscopy/EGD- 10/2016- negative per patient; had f/u 8/2020 which had a benign polyp per patient \par \par  [de-identified] : She has completed EBRT in the last week of February and tolerated it well, except for vulvar irritation.  She has occasional spotting that stopped ~ 2-3 weeks and resumed mid March, with occasional spotting. She denies any other associated signs and symptoms. She reports chronic right hip pain.

## 2021-03-29 NOTE — RESULTS/DATA
[FreeTextEntry1] : 3/24/21 CT Chest:  SHAINA\par \par 3/23/21 MRI Pelvis:  Status post hysterectomy previously demonstrated patchy cuff lesion today measures 1.5 x 1.9 x 1.3 cm in size with peripheral enhancement and possible central necrosis. This represents a decrease in size from prior CT scan where it measured 3.0 x 3.1 cm\par \par 12/24/20 CT C/A/P:  3.1 cm solid mass adjacent to the left vaginal apex suspicious for recurrent disease.  No evidence of metastatic disease in the thorax or abdomen.\par \par 12/22/20 path:  Vaginal cuff mass, biopsy:\par  - Clear cell carcinoma.  The tumor cells are focally positive for Napsin, and ER, and negative for CO, supporting the above diagnosis\par \par 6/10/20 Pathology:\par Uterus, cervix, bilateral fallopian tubes and ovaries, total hysterectomy and bilateral salpingo-oophorectomy:  Endometrial endometrioid carcinoma (FIGO I).  Tumor is extending into the adenomyosis foci.  Leiomyomas.  Adenomyosis. Unremarkable cervix.  Unremarkable bilateral ovaries and fallopian tubes\par Lymph node, pelvic sentinel, right, excision: Two lymph nodes negative for tumor (0/2) ( H and E and immunostain AE1-AE3)\par Uterus, lower uterine segment fibroid, removal:  Leiomyoma with degenerative changes\par Fallopian tube,  paratubal cyst, right, removal:  Unremarkable fallopian tube with microcalcifications. Paratubal cysts\par Lymph node, pelvic sentinel, left, excision:  Four  lymph nodes negative for tumor (0/7)( H and E and immunostain AE1-AE3)\par Comment :  Final grading of the tumor is grade II in correlation with patient prior biopsy. Of note, no myometrial invasion is identified, what was seen in the original frozen section slide is best interpreted as involvement / extension of the tumor into the foci of adenomyosis rather than myometrial invasion.\par Synoptic Summary\par Procedure:  Total hysterectomy and bilateral salpingo-oophorectomy\par Specimen Integrity:   Intact hysterectomy\par Tumor Site:   Anterior endometrium and Posterior endometrium\par Tumor Size: 3.5  cm ( greatest dimension)\par Histologic Type:  Endometrioid adenocarcinoma\par Histologic Grade:   FIGO grade II\par Myometrial Invasion:   Not present\par Adenomyosis: present - involved by carcinoma\par Uterine Serosa Involvement:   Not identified\par Lower Uterine Segment Involvement:  Not identified\par Cervical Stromal Involvement:   Not involved\par Other Tissue/Organs involvement:   Not involved\par Peritoneal or Ascitic Fluid:  Please see result ([id number])\par Margins:    Not applicable\par Lymphovascular Invasion:   Not identified\par Regional Lymph Nodes:\par Pelvic Lymph nodes:\par Number of Pelvic Nodes with Macrometastasis (>2mm):  0\par Number of Pelvic Nodes with Micrometastasis (>0.2 mm up to 2mm): 0\par Number of Pelvic Nodes with Isolated Tumor Cells (0.2 mm or less): 0\par Laterality of Pelvic Nodes with Tumor: N/A\par Total Number of Pelvic Nodes Examined (sentinel and non-sentinel): 6\par Number of Pelvic [place] Nodes Examined: 6\par Laterality of Pelvic Nodes Examined: Left and right\par Para-aortic Lymph nodes:  No lymph nodes submitted\par Pathologic Stage Classification (pTNM, AJCC 8th Edition)\par TNM descriptors : N/A\par Primary Tumor (pT):  pT1a\par Regional Lymph Nodes (pN) or pN (sn):   pN0\par Distant Metastasis (pM):  pM0\par FIGO Stage (2015 FIGO Cancer Report):  Ia\par Additional Pathologic Findings:    None\par Clinical History:   None\par Ancillary Studies:  (MMR IHC)\par Immunohistochemistry Testing (IHC) for Mismatch Repair Proteins performed on tissue block 1H shows the following result:\par MLH1 : Loss of nuclear expression\par MSH2 :  Intact nuclear expression\par MSH6:  Intact nuclear expression\par PMS2 : Loss of nuclear expression\par Interpretation: There is a loss of nuclear expression in MLH1 and PMS2, while MSH2 and MSH6 show intact nuclear expression. Loss of nuclear expression of MLH1 and PMS2 is supportive of DNA mismatch repair deficiency and high frequency of microsatellite instability (MSI-H).\par \par 6/10/20 Pathology:  Endocervix curettings - Fragments of endometrial adenocarcinoma morphologically similar to part 2.  Scant benign cervical tissue \par Endometrial curettings - Endometrial endometrioid adenocarcinoma with focal squamous and mucinous differentiation, FIGO grade 2 (see comment)\par Comment: Focal areas of tumor with clear cell morphology are seen admixed with fragments of endometrioid adenocarcinoma.

## 2021-03-29 NOTE — ASSESSMENT
[FreeTextEntry1] : Most relapses that occur among patients without prior radiation therapy are vaginal recurrences. Data regarding treatment of isolated vaginal relapse are limited, but outcomes appear favorable based on observational studies with RT and the few studies of surgical treatment for this patient population. In the PORTEC-1 trial, among 30 women without prior RT who experienced an isolated vaginal recurrence, the complete response rate to curative-intent treatment was 87 percent.  For most women (28 patients), curative-intent treatment consisted of whole-pelvic RT, with or without brachytherapy, although one of these patients also received surgery, and two were also treated with endocrine therapy.  \par \par For patients with extravaginal extension or pelvic lymph node involvement, the prognosis is worse. In PORTEC-1, only 4 of 10 patients who were treated for pelvic relapse with RT with curative intent reached complete remission.\par There is an ongoing [facility] trial 238, which randomly assigns these patients to RT with or without concurrent sensitizing cisplatin but is still considered investigational.  \par \par For patients treated with curative intent for locoregional recurrence, surveillance is necessary after treatment. Per the NCCN guidelines, CT CAP every six months for the first three years and then every 6 to 12 months for the following two years is recommended.

## 2021-03-29 NOTE — CONSULT LETTER
[Dear  ___] : Dear  [unfilled], [Consult Letter:] : I had the pleasure of evaluating your patient, [unfilled]. [Please see my note below.] : Please see my note below. [Consult Closing:] : Thank you very much for allowing me to participate in the care of this patient.  If you have any questions, please do not hesitate to contact me. [Sincerely,] : Sincerely, [DrLucien  ___] : Dr. LAGUNAS [DrLucien ___] : Dr. LAGUNAS [FreeTextEntry3] : Robyn Uribe

## 2021-03-30 ENCOUNTER — NON-APPOINTMENT (OUTPATIENT)
Age: 61
End: 2021-03-30

## 2021-03-30 VITALS
WEIGHT: 138 LBS | RESPIRATION RATE: 18 BRPM | HEIGHT: 67 IN | HEART RATE: 86 BPM | SYSTOLIC BLOOD PRESSURE: 115 MMHG | BODY MASS INDEX: 21.66 KG/M2 | DIASTOLIC BLOOD PRESSURE: 70 MMHG

## 2021-03-30 LAB
ALBUMIN SERPL ELPH-MCNC: 4.5 G/DL
ALP BLD-CCNC: 87 U/L
ALT SERPL-CCNC: 23 U/L
ANION GAP SERPL CALC-SCNC: 11 MMOL/L
AST SERPL-CCNC: 20 U/L
BILIRUB SERPL-MCNC: 0.4 MG/DL
BUN SERPL-MCNC: 16 MG/DL
CALCIUM SERPL-MCNC: 9.5 MG/DL
CEA SERPL-MCNC: 2 NG/ML
CHLORIDE SERPL-SCNC: 104 MMOL/L
CO2 SERPL-SCNC: 27 MMOL/L
CREAT SERPL-MCNC: 0.67 MG/DL
GLUCOSE SERPL-MCNC: 88 MG/DL
HBV CORE IGG+IGM SER QL: NONREACTIVE
HBV SURFACE AB SER QL: NONREACTIVE
HBV SURFACE AG SER QL: NONREACTIVE
HCV AB SER QL: NONREACTIVE
HCV S/CO RATIO: 0.07 S/CO
INR PPP: 0.99 RATIO
MAGNESIUM SERPL-MCNC: 2.3 MG/DL
POTASSIUM SERPL-SCNC: 4.4 MMOL/L
PROT SERPL-MCNC: 6.8 G/DL
PT BLD: 11.8 SEC
SODIUM SERPL-SCNC: 142 MMOL/L

## 2021-03-30 NOTE — VITALS
[Maximal Pain Intensity: 1/10] : 1/10 [90: Able to carry normal activity; minor signs or symptoms of disease.] : 90: Able to carry normal activity; minor signs or symptoms of disease.

## 2021-03-30 NOTE — HISTORY OF PRESENT ILLNESS
[FreeTextEntry1] : The patient is 59 yo nulligravida postmenopausal female with locally recurrent endometrial cancer. \par She presented with postmenopausal bleeding in 12/2020. She had not seen a gynecologist in 27 years and has significant history of severe vulvodynia and resultant refusal of pelvic exams.\par She noted intermittent postmenopausal spotting for greater than 2 years. Occurred once in 2018, once in 2019 then returned in Jan 2020 and is now occurring almost every day. 2/25/2020- Pelvic US- TA views only showed uterus- 8.9 x 4 x 5.6 cm, the myometrium is homogenous. There was a left fundal leiomyoma measuring 3.9 x 2.5 x 4.4 cm, and another right-sided leiomyoma measures 2.3 x 2 x 2.7 cm EMS- 13 mm RTO/LTO- WNL\par 3/5/2020- Pelvic MRI revealed uterus- 9.1 x 5.2 x 3.4 cm, several leiomyomata including 2 pedunculated left- sided leiomyoma to measuring 3.2 x and 2.5 cm, respectively as well as a fundal subserosal leiomyoma measuring 2.8 cm EMS- 10 mm, thickened with minimal heterogenous enhancement RTO- WNL with a apparent pedunculated 2.5 cm simple cyst with proteinaceous material and/or chronic hemorrhage noted in retrospect on prior US 11/8/18.  LTO- WNL No pelvic lymphadenopathy.\par Diagnostic D&C done 6/12/2020 showed grade 2 endometrial cancer with clear cell features. Robotic TLH/BSO  done on 6/22/2020. Pathology revealed 3.5 cm endometrioid adenocarcinoma without myometrial invasion and 4 negative pelvic lymph nodes. The  pelvic washings were negative for malignant cells. MMR: loss of nuclear expression of MLH1/PMS2; methylation detected, indicative of sporadic mutation. No adjuvant therapy was indicated.\par \par She had a postop checkup due to complaints of vaginal spotting on 12/8/2020. A 3 cm vaginal cuff lesion was noted and biopsy was performed and a friable mass was seen. Pathology showed a clear cell carcinoma. She continues to have spotting and vaginal bleeding on a daily basis, intermittent vulvodynia. Very anxious about the diagnosis.She was deemed not a surgical candidate and presents today to discuss the role of radiation treatment in her care with her  on the telephone.\par \par She presents today for an OTV #1/5 vaginal cuff SRS/SBRT. Has no complaints.

## 2021-03-30 NOTE — DISEASE MANAGEMENT
[Pathological] : TNM Stage: p [IA] : IA [FreeTextEntry4] : recurrent [TTNM] : 1 [NTNM] : 0 [MTNM] : 0 [de-identified] : 979 [de-identified] : vaginal cuff [de-identified] : 1401

## 2021-04-01 PROCEDURE — 77373 STRTCTC BDY RAD THER TX DLVR: CPT

## 2021-04-01 PROCEDURE — 99072 ADDL SUPL MATRL&STAF TM PHE: CPT

## 2021-04-05 PROCEDURE — 99072 ADDL SUPL MATRL&STAF TM PHE: CPT

## 2021-04-05 PROCEDURE — 77373 STRTCTC BDY RAD THER TX DLVR: CPT

## 2021-04-07 PROCEDURE — 77373 STRTCTC BDY RAD THER TX DLVR: CPT

## 2021-04-07 PROCEDURE — 99072 ADDL SUPL MATRL&STAF TM PHE: CPT

## 2021-04-09 ENCOUNTER — NON-APPOINTMENT (OUTPATIENT)
Age: 61
End: 2021-04-09

## 2021-04-09 VITALS
HEART RATE: 74 BPM | HEIGHT: 67 IN | BODY MASS INDEX: 21.82 KG/M2 | DIASTOLIC BLOOD PRESSURE: 70 MMHG | WEIGHT: 139 LBS | RESPIRATION RATE: 18 BRPM | SYSTOLIC BLOOD PRESSURE: 120 MMHG

## 2021-04-09 PROCEDURE — 99072 ADDL SUPL MATRL&STAF TM PHE: CPT

## 2021-04-09 PROCEDURE — 77373 STRTCTC BDY RAD THER TX DLVR: CPT

## 2021-04-12 PROCEDURE — 99072 ADDL SUPL MATRL&STAF TM PHE: CPT

## 2021-04-12 PROCEDURE — 77336 RADIATION PHYSICS CONSULT: CPT

## 2021-04-12 PROCEDURE — 77373 STRTCTC BDY RAD THER TX DLVR: CPT

## 2021-04-12 PROCEDURE — 77435 SBRT MANAGEMENT: CPT

## 2021-04-15 ENCOUNTER — OUTPATIENT (OUTPATIENT)
Dept: OUTPATIENT SERVICES | Facility: HOSPITAL | Age: 61
LOS: 1 days | Discharge: HOME | End: 2021-04-15

## 2021-04-15 DIAGNOSIS — N90.7 VULVAR CYST: Chronic | ICD-10-CM

## 2021-04-15 DIAGNOSIS — N93.9 ABNORMAL UTERINE AND VAGINAL BLEEDING, UNSPECIFIED: ICD-10-CM

## 2021-04-15 DIAGNOSIS — Z98.890 OTHER SPECIFIED POSTPROCEDURAL STATES: Chronic | ICD-10-CM

## 2021-04-16 ENCOUNTER — RESULT REVIEW (OUTPATIENT)
Age: 61
End: 2021-04-16

## 2021-04-16 ENCOUNTER — APPOINTMENT (OUTPATIENT)
Dept: HEMATOLOGY ONCOLOGY | Facility: CLINIC | Age: 61
End: 2021-04-16
Payer: COMMERCIAL

## 2021-04-16 VITALS
WEIGHT: 138.2 LBS | TEMPERATURE: 98.2 F | HEART RATE: 102 BPM | SYSTOLIC BLOOD PRESSURE: 121 MMHG | BODY MASS INDEX: 21.65 KG/M2 | DIASTOLIC BLOOD PRESSURE: 77 MMHG

## 2021-04-16 LAB
BASOPHILS # BLD AUTO: 0.05 K/UL — SIGNIFICANT CHANGE UP (ref 0–0.2)
BASOPHILS NFR BLD AUTO: 0.8 % — SIGNIFICANT CHANGE UP (ref 0–2)
EOSINOPHIL # BLD AUTO: 0.13 K/UL — SIGNIFICANT CHANGE UP (ref 0–0.5)
EOSINOPHIL NFR BLD AUTO: 2.1 % — SIGNIFICANT CHANGE UP (ref 0–6)
HCT VFR BLD CALC: 43.1 % — SIGNIFICANT CHANGE UP (ref 34.5–45)
HGB BLD-MCNC: 14.5 G/DL — SIGNIFICANT CHANGE UP (ref 11.5–15.5)
IMM GRANULOCYTES NFR BLD AUTO: 0.3 % — SIGNIFICANT CHANGE UP (ref 0–1.5)
LYMPHOCYTES # BLD AUTO: 0.61 K/UL — LOW (ref 1–3.3)
LYMPHOCYTES # BLD AUTO: 9.9 % — LOW (ref 13–44)
MCHC RBC-ENTMCNC: 32.2 PG — SIGNIFICANT CHANGE UP (ref 27–34)
MCHC RBC-ENTMCNC: 33.6 GM/DL — SIGNIFICANT CHANGE UP (ref 32–36)
MCV RBC AUTO: 95.6 FL — SIGNIFICANT CHANGE UP (ref 80–100)
MONOCYTES # BLD AUTO: 0.45 K/UL — SIGNIFICANT CHANGE UP (ref 0–0.9)
MONOCYTES NFR BLD AUTO: 7.3 % — SIGNIFICANT CHANGE UP (ref 2–14)
NEUTROPHILS # BLD AUTO: 4.89 K/UL — SIGNIFICANT CHANGE UP (ref 1.8–7.4)
NEUTROPHILS NFR BLD AUTO: 79.6 % — HIGH (ref 43–77)
NRBC # BLD: 0 /100 WBCS — SIGNIFICANT CHANGE UP (ref 0–0)
PLATELET # BLD AUTO: 299 K/UL — SIGNIFICANT CHANGE UP (ref 150–400)
RBC # BLD: 4.51 M/UL — SIGNIFICANT CHANGE UP (ref 3.8–5.2)
RBC # FLD: 12.8 % — SIGNIFICANT CHANGE UP (ref 10.3–14.5)
WBC # BLD: 6.15 K/UL — SIGNIFICANT CHANGE UP (ref 3.8–10.5)
WBC # FLD AUTO: 6.15 K/UL — SIGNIFICANT CHANGE UP (ref 3.8–10.5)

## 2021-04-16 PROCEDURE — 99215 OFFICE O/P EST HI 40 MIN: CPT

## 2021-04-16 NOTE — ASSESSMENT
[FreeTextEntry1] : Most relapses that occur among patients without prior radiation therapy are vaginal recurrences. Data regarding treatment of isolated vaginal relapse are limited, but outcomes appear favorable based on observational studies with RT and the few studies of surgical treatment for this patient population. In the PORTEC-1 trial, among 30 women without prior RT who experienced an isolated vaginal recurrence, the complete response rate to curative-intent treatment was 87 percent.  For most women (28 patients), curative-intent treatment consisted of whole-pelvic RT, with or without brachytherapy, although one of these patients also received surgery, and two were also treated with endocrine therapy.  \par \par For patients with extravaginal extension or pelvic lymph node involvement, the prognosis is worse. In PORTEC-1, only 4 of 10 patients who were treated for pelvic relapse with RT with curative intent reached complete remission.\par There is an ongoing [facility] trial 238, which randomly assigns these patients to RT with or without concurrent sensitizing cisplatin but is still considered investigational.  \par \par For patients treated with curative intent for locoregional recurrence, surveillance is necessary after treatment. Per the NCCN guidelines, CT CAP every six months for the first three years and then every 6 to 12 months for the following two years is recommended.

## 2021-04-16 NOTE — HISTORY OF PRESENT ILLNESS
[de-identified] : The patient was diagnosed with endometrial cancer in June 2020 at the age of 60.  She presented to GYN with postmenopausal bleeding in 12/2019. She had not seen a gynecologist in 27 years and had significant history of severe vulvodynia and resultant refusal of pelvic exams. She noted intermittent postmenopausal spotting for greater than 2 years. Occurred once in 2018, once in 2019 then returned in Jan 2020 almost every day.  2/25/20 Pelvic US:  uterus- left fundal leiomyoma measuring 3.9 x 2.5 x 4.4 cm, and another right-sided leiomyoma measures 2.3 x 2 x 2.7 cm.  3/5/2020- Pelvic MRI:   uterus-  several leiomyomata including 2 pedunculated left- sided leiomyoma measuring 3.2 x and 2.5 cm,  as well as a fundal subserosal leiomyoma measuring 2.8 cm, with an apparent pedunculated 2.5 cm.  No pelvic lymphadenopathy.  D&C 6/12/20:  grade 2 endometrial cancer with clear cell features. A robotic TLH/BSO on 6/22/20 revealed a 3.5 cm endometrioid adenocarcinoma without myometrial invasion and 4 negative pelvic lymph nodes. The pelvic washings were negative for malignant cells. MMR: loss of nuclear expression of MLH1/PMS2; methylation detected, indicative of sporadic mutation. No adjuvant therapy was indicated.  She had a postop checkup due to complaints of vaginal spotting on 12/8/2020. A 3 cm vaginal cuff lesion was noted and biopsy was performed and a friable mass was seen. Pathology showed a clear cell carcinoma.  She was deemed not a surgical candidate and is s/p EBRT with Dr. Katherine Dickerson with plans for brachytherapy as well.    [de-identified] : PMH:  She has significant history of anxiety and severe vulvodynia and resultant refusal of pelvic exams as well as sexual dysfunction.\par PSH: lumpectomy, multiple vulvar surgeries due to vulvodynia (Bartholin gland, hymenal tissue/scar tissue removed) under care of Dr. Jefferson Downs at [place]. \par FH:: father- mesothelioma, brother- bladder cancer, maternal grandmother with breast and colon cancer, maternal great aunt with breast, maternal second cousin with breast cancer.\par HM:  Mammo/ SBE/ CBE- followed by Dr. Briscoe (diffuse cystic mastopathy) - [facility] 9/2020 BIRADS2\par EGD- 10/2016- hiatal hernia\par Colonoscopy/EGD- 10/2016- negative per patient; had f/u 8/2020 which had a benign polyp per patient \par \par  [de-identified] : She has completed EBRT in the last week of February and tolerated it well, except for vulvar irritation.  She has occasional spotting that stopped ~ 2-3 weeks and resumed mid March, with occasional spotting. She denies any other associated signs and symptoms. She reports chronic right hip pain.  She continues to have spotting and vaginal bleeding on a daily basis, intermittent vulvodynia.

## 2021-04-16 NOTE — RESULTS/DATA
[FreeTextEntry1] : 3/24/21 CT Chest:  SHAINA\par \par 3/23/21 MRI Pelvis:  Status post hysterectomy previously demonstrated patchy cuff lesion today measures 1.5 x 1.9 x 1.3 cm in size with peripheral enhancement and possible central necrosis. This represents a decrease in size from prior CT scan where it measured 3.0 x 3.1 cm\par \par 12/24/20 CT C/A/P:  3.1 cm solid mass adjacent to the left vaginal apex suspicious for recurrent disease.  No evidence of metastatic disease in the thorax or abdomen.\par \par 12/22/20 path:  Vaginal cuff mass, biopsy:\par  - Clear cell carcinoma.  The tumor cells are focally positive for Napsin, and ER, and negative for SC, supporting the above diagnosis\par \par 6/10/20 Pathology:\par Uterus, cervix, bilateral fallopian tubes and ovaries, total hysterectomy and bilateral salpingo-oophorectomy:  Endometrial endometrioid carcinoma (FIGO I).  Tumor is extending into the adenomyosis foci.  Leiomyomas.  Adenomyosis. Unremarkable cervix.  Unremarkable bilateral ovaries and fallopian tubes\par Lymph node, pelvic sentinel, right, excision: Two lymph nodes negative for tumor (0/2) ( H and E and immunostain AE1-AE3)\par Uterus, lower uterine segment fibroid, removal:  Leiomyoma with degenerative changes\par Fallopian tube,  paratubal cyst, right, removal:  Unremarkable fallopian tube with microcalcifications. Paratubal cysts\par Lymph node, pelvic sentinel, left, excision:  Four  lymph nodes negative for tumor (0/7)( H and E and immunostain AE1-AE3)\par Comment :  Final grading of the tumor is grade II in correlation with patient prior biopsy. Of note, no myometrial invasion is identified, what was seen in the original frozen section slide is best interpreted as involvement / extension of the tumor into the foci of adenomyosis rather than myometrial invasion.\par Synoptic Summary\par Procedure:  Total hysterectomy and bilateral salpingo-oophorectomy\par Specimen Integrity:   Intact hysterectomy\par Tumor Site:   Anterior endometrium and Posterior endometrium\par Tumor Size: 3.5  cm ( greatest dimension)\par Histologic Type:  Endometrioid adenocarcinoma\par Histologic Grade:   FIGO grade II\par Myometrial Invasion:   Not present\par Adenomyosis: present - involved by carcinoma\par Uterine Serosa Involvement:   Not identified\par Lower Uterine Segment Involvement:  Not identified\par Cervical Stromal Involvement:   Not involved\par Other Tissue/Organs involvement:   Not involved\par Peritoneal or Ascitic Fluid:  Please see result ([id number])\par Margins:    Not applicable\par Lymphovascular Invasion:   Not identified\par Regional Lymph Nodes:\par Pelvic Lymph nodes:\par Number of Pelvic Nodes with Macrometastasis (>2mm):  0\par Number of Pelvic Nodes with Micrometastasis (>0.2 mm up to 2mm): 0\par Number of Pelvic Nodes with Isolated Tumor Cells (0.2 mm or less): 0\par Laterality of Pelvic Nodes with Tumor: N/A\par Total Number of Pelvic Nodes Examined (sentinel and non-sentinel): 6\par Number of Pelvic [place] Nodes Examined: 6\par Laterality of Pelvic Nodes Examined: Left and right\par Para-aortic Lymph nodes:  No lymph nodes submitted\par Pathologic Stage Classification (pTNM, AJCC 8th Edition)\par TNM descriptors : N/A\par Primary Tumor (pT):  pT1a\par Regional Lymph Nodes (pN) or pN (sn):   pN0\par Distant Metastasis (pM):  pM0\par FIGO Stage (2015 FIGO Cancer Report):  Ia\par Additional Pathologic Findings:    None\par Clinical History:   None\par Ancillary Studies:  (MMR IHC)\par Immunohistochemistry Testing (IHC) for Mismatch Repair Proteins performed on tissue block 1H shows the following result:\par MLH1 : Loss of nuclear expression\par MSH2 :  Intact nuclear expression\par MSH6:  Intact nuclear expression\par PMS2 : Loss of nuclear expression\par Interpretation: There is a loss of nuclear expression in MLH1 and PMS2, while MSH2 and MSH6 show intact nuclear expression. Loss of nuclear expression of MLH1 and PMS2 is supportive of DNA mismatch repair deficiency and high frequency of microsatellite instability (MSI-H).\par \par 6/10/20 Pathology:  Endocervix curettings - Fragments of endometrial adenocarcinoma morphologically similar to part 2.  Scant benign cervical tissue \par Endometrial curettings - Endometrial endometrioid adenocarcinoma with focal squamous and mucinous differentiation, FIGO grade 2 (see comment)\par Comment: Focal areas of tumor with clear cell morphology are seen admixed with fragments of endometrioid adenocarcinoma.

## 2021-04-17 LAB
ALBUMIN SERPL ELPH-MCNC: 4.5 G/DL — SIGNIFICANT CHANGE UP (ref 3.3–5)
ALP SERPL-CCNC: 89 U/L — SIGNIFICANT CHANGE UP (ref 40–120)
ALT FLD-CCNC: 39 U/L — SIGNIFICANT CHANGE UP (ref 10–45)
ANION GAP SERPL CALC-SCNC: 10 MMOL/L — SIGNIFICANT CHANGE UP (ref 5–17)
AST SERPL-CCNC: 25 U/L — SIGNIFICANT CHANGE UP (ref 10–40)
BILIRUB SERPL-MCNC: 0.4 MG/DL — SIGNIFICANT CHANGE UP (ref 0.2–1.2)
BUN SERPL-MCNC: 16 MG/DL — SIGNIFICANT CHANGE UP (ref 7–23)
CALCIUM SERPL-MCNC: 9.1 MG/DL — SIGNIFICANT CHANGE UP (ref 8.4–10.5)
CANCER AG125 SERPL-ACNC: 7 U/ML — SIGNIFICANT CHANGE UP
CHLORIDE SERPL-SCNC: 103 MMOL/L — SIGNIFICANT CHANGE UP (ref 96–108)
CO2 SERPL-SCNC: 26 MMOL/L — SIGNIFICANT CHANGE UP (ref 22–31)
CREAT SERPL-MCNC: 0.68 MG/DL — SIGNIFICANT CHANGE UP (ref 0.5–1.3)
GLUCOSE SERPL-MCNC: 96 MG/DL — SIGNIFICANT CHANGE UP (ref 70–99)
MAGNESIUM SERPL-MCNC: 2.2 MG/DL — SIGNIFICANT CHANGE UP (ref 1.6–2.6)
POTASSIUM SERPL-MCNC: 4.2 MMOL/L — SIGNIFICANT CHANGE UP (ref 3.5–5.3)
POTASSIUM SERPL-SCNC: 4.2 MMOL/L — SIGNIFICANT CHANGE UP (ref 3.5–5.3)
PROT SERPL-MCNC: 6.6 G/DL — SIGNIFICANT CHANGE UP (ref 6–8.3)
SODIUM SERPL-SCNC: 140 MMOL/L — SIGNIFICANT CHANGE UP (ref 135–145)

## 2021-04-19 DIAGNOSIS — F41.9 ANXIETY DISORDER, UNSPECIFIED: ICD-10-CM

## 2021-04-19 DIAGNOSIS — C54.1 MALIGNANT NEOPLASM OF ENDOMETRIUM: ICD-10-CM

## 2021-05-04 ENCOUNTER — APPOINTMENT (OUTPATIENT)
Dept: PHYSICAL MEDICINE AND REHAB | Facility: CLINIC | Age: 61
End: 2021-05-04
Payer: COMMERCIAL

## 2021-05-04 VITALS — WEIGHT: 138 LBS | HEIGHT: 67 IN | TEMPERATURE: 97.2 F | BODY MASS INDEX: 21.66 KG/M2

## 2021-05-04 PROCEDURE — 99204 OFFICE O/P NEW MOD 45 MIN: CPT

## 2021-05-04 PROCEDURE — 99072 ADDL SUPL MATRL&STAF TM PHE: CPT

## 2021-05-04 RX ORDER — VENLAFAXINE HYDROCHLORIDE 37.5 MG/1
37.5 CAPSULE, EXTENDED RELEASE ORAL
Qty: 60 | Refills: 5 | Status: DISCONTINUED | COMMUNITY
Start: 2020-10-09 | End: 2021-05-04

## 2021-05-04 RX ORDER — ESCITALOPRAM OXALATE 10 MG/1
10 TABLET ORAL
Qty: 30 | Refills: 0 | Status: DISCONTINUED | COMMUNITY
Start: 2021-04-01 | End: 2021-05-04

## 2021-05-04 NOTE — ASSESSMENT
[FreeTextEntry1] : 60-year-old female with history of endometrial cancer presenting for evaluation.\par \par #Vulvodynia/neuropathic pain\par -She reports she has a history of chronic vulvodynia however pain has been exacerbated with recent surgeries and radiation.  She describes excessive burning sensation which is consistent with neuropathic pain symptoms.\par -Oncology, radiation oncology, and GYN notes reviewed\par -Extensive discussion had with patient about means of treating neuropathic pain symptoms.  Given she is also having some symptoms of hot flashes will increase her gabapentin which may assist with neuropathic pain as well as her hot flashes.\par -Increase gabapentin to 300 mg in the morning 300 mg in the afternoon and 600 mg at night.\par -Discussed if no improvement can initiate therapy with SNRI\par -Discussed with patient the benefits of pelvic floor therapy, patient will consider at next visit.\par \par #Anxiety\par -Patient does report having anxiety and is curious about alternative methods to help improve.\par -Patient was educated on home exercise program.  She did discuss that she does have resistance to exercising since her previous treatments.  Discussed with her possibility of initiating a supervised exercise program and physical therapy, she will consider at next visit.\par \par Follow up in 1 month.

## 2021-05-04 NOTE — PHYSICAL EXAM
[FreeTextEntry1] : Gen: Patient is A&O x 3, NAD\par HEENT: EOMI, hearing grossly normal\par Resp: regular, non - labored\par CV: pulses regular\par Skin: no rashes, erythema\par Lymph: no clubbing, cyanosis, edema, or palpable lymphadenopathy\par Inspection: no instability or misalignment\par ROM: full throughout, no pain with lumbar flexion or extension \par Palpation:no tenderness to palpation in lumbar spine \par Sensation: intact to light touch\par Reflexes: 1+ and symmetric throughout\par Strength: 5/5 throughout\par Special tests: -Straight leg raise \par Gait: normal, non-antalgic\par \par

## 2021-05-04 NOTE — HISTORY OF PRESENT ILLNESS
[FreeTextEntry1] : Ms. Ruvalcaba is a 60 year old female with history of Endometrial cancer Stage IA FIGO grade 2 mixed EMC with locally recurrent endometrial cancer.  Recurrence of clear cell carcinoma at vaginal cuff ~ 6 months out from surgery.  She is s/p robotic TLH/BSO on 6/22/20 with Dr. Montoya.  She is s/p EBRT with Dr. Dickerson end 02/21.  \par    \par She reports a chronic history of vulvodynia for multiple years however she reports is recent been exacerbated from her previous surgeries as well as radiation treatment.  She describes increased burning in this region.  She also reports difficulty and concern for mobility and ambulation.  She does also report anxiety related to her current condition.  She is currently taking gabapentin 400 mg twice a day.

## 2021-05-12 ENCOUNTER — APPOINTMENT (OUTPATIENT)
Dept: RADIATION ONCOLOGY | Facility: CLINIC | Age: 61
End: 2021-05-12
Payer: COMMERCIAL

## 2021-05-12 VITALS
DIASTOLIC BLOOD PRESSURE: 82 MMHG | WEIGHT: 140 LBS | BODY MASS INDEX: 21.97 KG/M2 | SYSTOLIC BLOOD PRESSURE: 124 MMHG | HEIGHT: 67 IN

## 2021-05-12 PROCEDURE — 99024 POSTOP FOLLOW-UP VISIT: CPT

## 2021-05-12 RX ORDER — PHENAZOPYRIDINE 200 MG/1
200 TABLET, FILM COATED ORAL 3 TIMES DAILY
Qty: 30 | Refills: 2 | Status: DISCONTINUED | COMMUNITY
Start: 2021-01-22 | End: 2021-05-12

## 2021-05-12 RX ORDER — ONDANSETRON 8 MG/1
8 TABLET ORAL
Qty: 30 | Refills: 0 | Status: DISCONTINUED | COMMUNITY
Start: 2021-01-28 | End: 2021-05-12

## 2021-05-12 RX ORDER — SILVER SULFADIAZINE 10 MG/G
1 CREAM TOPICAL TWICE DAILY
Qty: 1 | Refills: 3 | Status: DISCONTINUED | COMMUNITY
Start: 2021-02-16 | End: 2021-05-12

## 2021-05-12 NOTE — HISTORY OF PRESENT ILLNESS
[FreeTextEntry1] : The patient is 60 yo nulligravida postmenopausal female with locally recurrent endometrial cancer. She presented with postmenopausal bleeding in 12/2020. She had not seen a gynecologist in 27 years and has significant history of severe vulvodynia and resultant refusal of pelvic exams.\par She noted intermittent postmenopausal spotting for greater than 2 years. Occurred once in 2018, once in 2019 then returned in Jan 2020 and is now occurring almost every day. 2/25/2020- Pelvic US- TA views only showed uterus- 8.9 x 4 x 5.6 cm, the myometrium is homogenous. There was a left fundal leiomyoma measuring 3.9 x 2.5 x 4.4 cm, and another right-sided leiomyoma measures 2.3 x 2 x 2.7 cm EMS- 13 mm RTO/LTO- WNL\par 3/5/2020- Pelvic MRI revealed uterus- 9.1 x 5.2 x 3.4 cm, several leiomyomata including 2 pedunculated left- sided leiomyoma to measuring 3.2 x and 2.5 cm, respectively as well as a fundal subserosal leiomyoma measuring 2.8 cm EMS- 10 mm, thickened with minimal heterogenous enhancement RTO- WNL with a apparent pedunculated 2.5 cm simple cyst with proteinaceous material and/or chronic hemorrhage noted in retrospect on prior US 11/8/18.  LTO- WNL No pelvic lymphadenopathy.\par Diagnostic D&C done 6/12/2020 showed grade 2 endometrial cancer with clear cell features. Robotic TLH/BSO  done on 6/22/2020. Pathology revealed 3.5 cm endometrioid adenocarcinoma without myometrial invasion and 4 negative pelvic lymph nodes. The  pelvic washings were negative for malignant cells. MMR: loss of nuclear expression of MLH1/PMS2; methylation detected, indicative of sporadic mutation. No adjuvant therapy was indicated.\par \par She had a postop checkup due to complaints of vaginal spotting on 12/8/2020. A 3 cm vaginal cuff lesion was noted and biopsy was performed and a friable mass was seen. Pathology showed a clear cell carcinoma. She continues to have spotting and vaginal bleeding on a daily basis, intermittent vulvodynia. Very anxious about the diagnosis.She was deemed not a surgical candidate and presents today to discuss the role of radiation treatment in her care with her  on the telephone. She completed a dose of 5040 to the pelvis on 2/26/21, and a vaginal boost 2500 cGy on 4/12/21. \par \par She presents for a one month PTE. He saw Dr. Strickland on 5/4/21 for continued vulvodynia. Currently taking gabapentin 300 mg in the morning, 300 mg in the afternoon, and 600 mg at night with relief. Fatigue noted. Denies vaginal bleeding or pain.

## 2021-05-12 NOTE — PHYSICAL EXAM
[Normal] : no palpable adenopathy [Femoral Lymph Nodes Enlarged Bilaterally] : femoral [Inguinal Lymph Nodes Enlarged Bilaterally] : inguinal

## 2021-05-28 ENCOUNTER — APPOINTMENT (OUTPATIENT)
Dept: FAMILY MEDICINE | Facility: CLINIC | Age: 61
End: 2021-05-28
Payer: COMMERCIAL

## 2021-05-28 VITALS
WEIGHT: 140 LBS | OXYGEN SATURATION: 98 % | HEIGHT: 67 IN | SYSTOLIC BLOOD PRESSURE: 120 MMHG | TEMPERATURE: 97.9 F | BODY MASS INDEX: 21.97 KG/M2 | HEART RATE: 104 BPM | DIASTOLIC BLOOD PRESSURE: 84 MMHG

## 2021-05-28 PROCEDURE — 99214 OFFICE O/P EST MOD 30 MIN: CPT

## 2021-05-28 NOTE — HISTORY OF PRESENT ILLNESS
[FreeTextEntry8] : Saw bright red blood on toilet paper yesterday and today patient has been alternating between constipation and diarrhea ever since received radiation for endometrial cancer.  Physical exam there is a tiny laceration right perianal area responsible for bleeding digital rectal exam is negative\par \par Chronic recurrent low back pain positional no sciatica also chronic recurrent right lower quadrant pain unassociated with change in bowel or bladder scheduled for PET scan next month to follow-up on recurrence of endometrial cancer.  She is able to bend at the waist no increased pain with axial rotation\par \par Oncologist requests CBC and thyroid function for fatigue

## 2021-05-28 NOTE — ASSESSMENT
[FreeTextEntry1] : Rectal bleeding due to tiny laceration perianal area patient reassured increase fiber in diet to normalize bowel movements\par \par Back pain abdominal pain most likely musculoskeletal due for PET scan next month

## 2021-05-29 LAB
BASOPHILS # BLD AUTO: 0.05 K/UL
BASOPHILS NFR BLD AUTO: 0.7 %
EOSINOPHIL # BLD AUTO: 0.08 K/UL
EOSINOPHIL NFR BLD AUTO: 1.2 %
HCT VFR BLD CALC: 42.1 %
HGB BLD-MCNC: 13.7 G/DL
IMM GRANULOCYTES NFR BLD AUTO: 0.4 %
LYMPHOCYTES # BLD AUTO: 0.51 K/UL
LYMPHOCYTES NFR BLD AUTO: 7.5 %
MAN DIFF?: NORMAL
MCHC RBC-ENTMCNC: 31.8 PG
MCHC RBC-ENTMCNC: 32.5 GM/DL
MCV RBC AUTO: 97.7 FL
MONOCYTES # BLD AUTO: 0.6 K/UL
MONOCYTES NFR BLD AUTO: 8.8 %
NEUTROPHILS # BLD AUTO: 5.53 K/UL
NEUTROPHILS NFR BLD AUTO: 81.4 %
PLATELET # BLD AUTO: 278 K/UL
RBC # BLD: 4.31 M/UL
RBC # FLD: 12 %
TSH SERPL-ACNC: 1.37 UIU/ML
WBC # FLD AUTO: 6.8 K/UL

## 2021-06-01 ENCOUNTER — APPOINTMENT (OUTPATIENT)
Dept: GYNECOLOGIC ONCOLOGY | Facility: CLINIC | Age: 61
End: 2021-06-01
Payer: COMMERCIAL

## 2021-06-01 ENCOUNTER — NON-APPOINTMENT (OUTPATIENT)
Age: 61
End: 2021-06-01

## 2021-06-22 ENCOUNTER — APPOINTMENT (OUTPATIENT)
Dept: NUCLEAR MEDICINE | Facility: CLINIC | Age: 61
End: 2021-06-22
Payer: COMMERCIAL

## 2021-06-22 ENCOUNTER — OUTPATIENT (OUTPATIENT)
Dept: OUTPATIENT SERVICES | Facility: HOSPITAL | Age: 61
LOS: 1 days | End: 2021-06-22
Payer: COMMERCIAL

## 2021-06-22 DIAGNOSIS — C54.1 MALIGNANT NEOPLASM OF ENDOMETRIUM: ICD-10-CM

## 2021-06-22 DIAGNOSIS — N90.7 VULVAR CYST: Chronic | ICD-10-CM

## 2021-06-22 DIAGNOSIS — Z98.890 OTHER SPECIFIED POSTPROCEDURAL STATES: Chronic | ICD-10-CM

## 2021-06-22 PROCEDURE — 78815 PET IMAGE W/CT SKULL-THIGH: CPT

## 2021-06-22 PROCEDURE — 78815 PET IMAGE W/CT SKULL-THIGH: CPT | Mod: 26,PS

## 2021-06-22 PROCEDURE — A9552: CPT

## 2021-06-22 NOTE — PHYSICAL EXAM
[Normal] : normoactive bowel sounds, soft and nontender, no hepatosplenomegaly or masses appreciated [Normal] : normal external genitalia [Normal External Genitalia] : normal external genitalia  [de-identified] : no erythema of the vulva. faint erythema vagina without desquamation

## 2021-06-22 NOTE — DISEASE MANAGEMENT
[Pathological] : TNM Stage: p [IA] : IA [FreeTextEntry4] : recurrent [TTNM] : 1 [NTNM] : 0 [MTNM] : 0 [de-identified] : 2000 [de-identified] : 3172 [de-identified] : Pelvis

## 2021-06-22 NOTE — HISTORY OF PRESENT ILLNESS
[FreeTextEntry1] : The patient is 59 yo nulligravida postmenopausal female with locally recurrent endometrial cancer. \par She presented with postmenopausal bleeding in 12/2020. She had not seen a gynecologist in 27 years and has significant history of severe vulvodynia and resultant refusal of pelvic exams.\par She noted intermittent postmenopausal spotting for greater than 2 years. Occurred once in 2018, once in 2019 then returned in Jan 2020 and is now occurring almost every day. 2/25/2020- Pelvic US- TA views only showed uterus- 8.9 x 4 x 5.6 cm, the myometrium is homogenous. There was a left fundal leiomyoma measuring 3.9 x 2.5 x 4.4 cm, and another right-sided leiomyoma measures 2.3 x 2 x 2.7 cm EMS- 13 mm RTO/LTO- WNL\par 3/5/2020- Pelvic MRI revealed uterus- 9.1 x 5.2 x 3.4 cm, several leiomyomata including 2 pedunculated left- sided leiomyoma to measuring 3.2 x and 2.5 cm, respectively as well as a fundal subserosal leiomyoma measuring 2.8 cm EMS- 10 mm, thickened with minimal heterogenous enhancement RTO- WNL with a apparent pedunculated 2.5 cm simple cyst with proteinaceous material and/or chronic hemorrhage noted in retrospect on prior US 11/8/18.  LTO- WNL No pelvic lymphadenopathy.\par Diagnostic D&C done 6/12/2020 showed grade 2 endometrial cancer with clear cell features. Robotic TLH/BSO  done on 6/22/2020. Pathology revealed 3.5 cm endometrioid adenocarcinoma without myometrial invasion and 4 negative pelvic lymph nodes. The  pelvic washings were negative for malignant cells. MMR: loss of nuclear expression of MLH1/PMS2; methylation detected, indicative of sporadic mutation. No adjuvant therapy was indicated.\par \par She had a postop checkup due to complaints of vaginal spotting on 12/8/2020. A 3 cm vaginal cuff lesion was noted and biopsy was performed and a friable mass was seen. Pathology showed a clear cell carcinoma. She continues to have spotting and vaginal bleeding on a daily basis, intermittent vulvodynia. Very anxious about the diagnosis.She was deemed not a surgical candidate and presents today to discuss the role of radiation treatment in her care with her  on the telephone.\par \par She presents today for an OTV 4/5, she c/o occasional spotting and blood when wiping, continue with enemas and Dulcolax till the end of the treatment.

## 2021-06-24 ENCOUNTER — OUTPATIENT (OUTPATIENT)
Dept: OUTPATIENT SERVICES | Facility: HOSPITAL | Age: 61
LOS: 1 days | Discharge: HOME | End: 2021-06-24

## 2021-06-24 ENCOUNTER — APPOINTMENT (OUTPATIENT)
Dept: SURGERY | Facility: CLINIC | Age: 61
End: 2021-06-24
Payer: COMMERCIAL

## 2021-06-24 VITALS
HEIGHT: 67 IN | WEIGHT: 141 LBS | BODY MASS INDEX: 22.13 KG/M2 | SYSTOLIC BLOOD PRESSURE: 107 MMHG | HEART RATE: 79 BPM | DIASTOLIC BLOOD PRESSURE: 77 MMHG | TEMPERATURE: 98 F | OXYGEN SATURATION: 97 %

## 2021-06-24 DIAGNOSIS — Z98.890 OTHER SPECIFIED POSTPROCEDURAL STATES: Chronic | ICD-10-CM

## 2021-06-24 DIAGNOSIS — N93.9 ABNORMAL UTERINE AND VAGINAL BLEEDING, UNSPECIFIED: ICD-10-CM

## 2021-06-24 DIAGNOSIS — N90.7 VULVAR CYST: Chronic | ICD-10-CM

## 2021-06-24 PROCEDURE — 99213 OFFICE O/P EST LOW 20 MIN: CPT

## 2021-06-28 ENCOUNTER — RESULT REVIEW (OUTPATIENT)
Age: 61
End: 2021-06-28

## 2021-06-28 ENCOUNTER — APPOINTMENT (OUTPATIENT)
Dept: HEMATOLOGY ONCOLOGY | Facility: CLINIC | Age: 61
End: 2021-06-28
Payer: COMMERCIAL

## 2021-06-28 VITALS
BODY MASS INDEX: 22.24 KG/M2 | TEMPERATURE: 97.9 F | WEIGHT: 142 LBS | SYSTOLIC BLOOD PRESSURE: 119 MMHG | DIASTOLIC BLOOD PRESSURE: 75 MMHG | HEART RATE: 90 BPM

## 2021-06-28 PROCEDURE — 99215 OFFICE O/P EST HI 40 MIN: CPT

## 2021-06-28 NOTE — HISTORY OF PRESENT ILLNESS
[FreeTextEntry1] : I had the pleasure of seeing STARLA HADDAD  in the office today for a follow up visit.\par \par She reports bilateral breast pain ( L>R).  She saw Dr. Jarrell (cardiologist) and underwent echocardiogram and states the test came back within normal limits.\par \par History with endometrial cancer stage IA FIGO grade 2 mixed EMC with locally recurrent endometrial cancer.  Recurrence of clear cell carcinoma at vaginal cuff.  She underwent robotic TLH/BSO on 6/22/20 with Dr. Montoya.  She is s/p EBRT with Dr. Dickerson ending 2/2021.\par \par She denies dominant breast mass, skin changes or nipple discharge. She denies headaches, blurry vision, chest pain, SOB, abdominal pain, joint aches or difficult walking.\par \par G0.  Menses began at age 15.\par She denies personal history of malignancy.\par Family history is significant for maternal great aunt diagnosed with breast cancer at age 40, maternal cousin diagnosed with breast cancer at age 29, maternal cousin diagnosed with cancer at age 35 and maternal grandmother diagnosed with breast cancer at age 80.  Maternal grandmother diagnosed with colon cancer at age 88.\par \par Imaging:  [facility]  Radiology\par 9/23/2021  MAMMO SCREENING ROGERIO BILAT\par Impression:  No mammographic evidence of malignancy.  Recommend routine screening.  BIRADS 2 benign\par \par 9/23/2020 BREAST ULTRASOUND BILATERAL COMPLETE\par Impression:  Bilateral cysts.  BIRADS 2 benign\par \par \par We reviewed clinical exam and her exam is benign today.  She is scheduled for her screening mammogram and ultrasound next week at [facility] and she is to call the office the next day to review results.  Recommendation for follow up in 6 months if imaging is stable and benign.  \par \par She understands and agrees to plan.

## 2021-06-28 NOTE — RESULTS/DATA
[FreeTextEntry1] : 6/22/2021 PET/CT - Minimal soft tissue thickening in the region of the left vaginal cuff which is non-FDG avid, unchanged since March 23, 2021 and considerably decreased as compared to CT December 24, 2020.  No evidence of FDG avid distant metastatic disease.\par \par 3/24/21 CT Chest:  SHAINA\par \par 3/23/21 MRI Pelvis:  Status post hysterectomy previously demonstrated patchy cuff lesion today measures 1.5 x 1.9 x 1.3 cm in size with peripheral enhancement and possible central necrosis. This represents a decrease in size from prior CT scan where it measured 3.0 x 3.1 cm\par \par 12/24/20 CT C/A/P:  3.1 cm solid mass adjacent to the left vaginal apex suspicious for recurrent disease.  No evidence of metastatic disease in the thorax or abdomen.\par \par 12/22/20 path:  Vaginal cuff mass, biopsy:\par  - Clear cell carcinoma.  The tumor cells are focally positive for Napsin, and ER, and negative for MD, supporting the above diagnosis\par \par 6/10/20 Pathology:\par Uterus, cervix, bilateral fallopian tubes and ovaries, total hysterectomy and bilateral salpingo-oophorectomy:  Endometrial endometrioid carcinoma (FIGO I).  Tumor is extending into the adenomyosis foci.  Leiomyomas.  Adenomyosis. Unremarkable cervix.  Unremarkable bilateral ovaries and fallopian tubes\par Lymph node, pelvic sentinel, right, excision: Two lymph nodes negative for tumor (0/2) ( H and E and immunostain AE1-AE3)\par Uterus, lower uterine segment fibroid, removal:  Leiomyoma with degenerative changes\par Fallopian tube,  paratubal cyst, right, removal:  Unremarkable fallopian tube with microcalcifications. Paratubal cysts\par Lymph node, pelvic sentinel, left, excision:  Four  lymph nodes negative for tumor (0/7)( H and E and immunostain AE1-AE3)\par Comment :  Final grading of the tumor is grade II in correlation with patient prior biopsy. Of note, no myometrial invasion is identified, what was seen in the original frozen section slide is best interpreted as involvement / extension of the tumor into the foci of adenomyosis rather than myometrial invasion.\par Synoptic Summary\par Procedure:  Total hysterectomy and bilateral salpingo-oophorectomy\par Specimen Integrity:   Intact hysterectomy\par Tumor Site:   Anterior endometrium and Posterior endometrium\par Tumor Size: 3.5  cm ( greatest dimension)\par Histologic Type:  Endometrioid adenocarcinoma\par Histologic Grade:   FIGO grade II\par Myometrial Invasion:   Not present\par Adenomyosis: present - involved by carcinoma\par Uterine Serosa Involvement:   Not identified\par Lower Uterine Segment Involvement:  Not identified\par Cervical Stromal Involvement:   Not involved\par Other Tissue/Organs involvement:   Not involved\par Peritoneal or Ascitic Fluid:  Please see result ([id number])\par Margins:    Not applicable\par Lymphovascular Invasion:   Not identified\par Regional Lymph Nodes:\par Pelvic Lymph nodes:\par Number of Pelvic Nodes with Macrometastasis (>2mm):  0\par Number of Pelvic Nodes with Micrometastasis (>0.2 mm up to 2mm): 0\par Number of Pelvic Nodes with Isolated Tumor Cells (0.2 mm or less): 0\par Laterality of Pelvic Nodes with Tumor: N/A\par Total Number of Pelvic Nodes Examined (sentinel and non-sentinel): 6\par Number of Pelvic [place] Nodes Examined: 6\par Laterality of Pelvic Nodes Examined: Left and right\par Para-aortic Lymph nodes:  No lymph nodes submitted\par Pathologic Stage Classification (pTNM, AJCC 8th Edition)\par TNM descriptors : N/A\par Primary Tumor (pT):  pT1a\par Regional Lymph Nodes (pN) or pN (sn):   pN0\par Distant Metastasis (pM):  pM0\par FIGO Stage (2015 FIGO Cancer Report):  Ia\par Additional Pathologic Findings:    None\par Clinical History:   None\par Ancillary Studies:  (MMR IHC)\par Immunohistochemistry Testing (IHC) for Mismatch Repair Proteins performed on tissue block 1H shows the following result:\par MLH1 : Loss of nuclear expression\par MSH2 :  Intact nuclear expression\par MSH6:  Intact nuclear expression\par PMS2 : Loss of nuclear expression\par Interpretation: There is a loss of nuclear expression in MLH1 and PMS2, while MSH2 and MSH6 show intact nuclear expression. Loss of nuclear expression of MLH1 and PMS2 is supportive of DNA mismatch repair deficiency and high frequency of microsatellite instability (MSI-H).\par \par 6/10/20 Pathology:  Endocervix curettings - Fragments of endometrial adenocarcinoma morphologically similar to part 2.  Scant benign cervical tissue \par Endometrial curettings - Endometrial endometrioid adenocarcinoma with focal squamous and mucinous differentiation, FIGO grade 2 (see comment)\par Comment: Focal areas of tumor with clear cell morphology are seen admixed with fragments of endometrioid adenocarcinoma.

## 2021-06-28 NOTE — ASSESSMENT
[FreeTextEntry1] : Most relapses that occur among patients without prior radiation therapy are vaginal recurrences. Data regarding treatment of isolated vaginal relapse are limited, but outcomes appear favorable based on observational studies with RT and the few studies of surgical treatment for this patient population. In the PORTEC-1 trial, among 30 women without prior RT who experienced an isolated vaginal recurrence, the complete response rate to curative-intent treatment was 87 percent.  For most women (28 patients), curative-intent treatment consisted of whole-pelvic RT, with or without brachytherapy, although one of these patients also received surgery, and two were also treated with endocrine therapy.  \par \par For patients with extravaginal extension or pelvic lymph node involvement, the prognosis is worse. In PORTEC-1, only 4 of 10 patients who were treated for pelvic relapse with RT with curative intent reached complete remission.\par There is an ongoing [facility] trial 238, which randomly assigns these patients to RT with or without concurrent sensitizing cisplatin but is still considered investigational.  \par \par For patients treated with curative intent for locoregional recurrence, surveillance is necessary after treatment. Per the NCCN guidelines, CT CAP every six months for the first three years and then every 6 to 12 months for the following two years is recommended.

## 2021-06-28 NOTE — ASSESSMENT
[FreeTextEntry1] : 62 yo female with fibrocystic breast disease presents followup exam.  She history with endometrial cancer and underwent surgery with Dr. Montoya.  Clinical exam is benign and she will be due for screening mammogram and sonogram in September.  Follow up in 6 months if imaging is stable and benign.\par 1.  Screening mammogram/sonogram 9/2021\par 2.  Followup in 6 months if imaging is stable and benign

## 2021-06-28 NOTE — PHYSICAL EXAM
[Normocephalic] : normocephalic [Atraumatic] : atraumatic [EOMI] : extra ocular movement intact [PERRL] : pupils equal, round and reactive to light [Sclera nonicteric] : sclera nonicteric [Supple] : supple [No Supraclavicular Adenopathy] : no supraclavicular adenopathy [Examined in the supine and seated position] : examined in the supine and seated position [No dominant masses] : no dominant masses in right breast  [No dominant masses] : no dominant masses left breast [No Nipple Retraction] : no left nipple retraction [No Nipple Discharge] : no left nipple discharge [Breast Nipple Inversion] : nipples not inverted [Breast Nipple Retraction] : nipples not retracted [Breast Nipple Flattening] : nipples not flattened [Breast Nipple Fissures] : nipples not fissured [Breast Abnormal Lactation (Galactorrhea)] : no galactorrhea [Breast Abnormal Secretion Bloody Fluid] : no bloody discharge [Breast Abnormal Secretion Serous Fluid] : no serous discharge [Breast Abnormal Secretion Opalescent Fluid] : no milky discharge [No Axillary Lymphadenopathy] : no left axillary lymphadenopathy [No Edema] : no edema [No Rashes] : no rashes [No Ulceration] : no ulceration [de-identified] : No supraclavicular or axillary adenopathy. No dominant masses, normal to palpation. Everted nipple without discharge. No skin changes.\par \par  [de-identified] : No supraclavicular or axillary adenopathy. No dominant masses, normal to palpation. Everted nipple without discharge. No skin changes.\par \par

## 2021-06-28 NOTE — HISTORY OF PRESENT ILLNESS
[Disease: _____________________] : Disease: [unfilled] [de-identified] : The patient was diagnosed with endometrial cancer in June 2020 at the age of 60.  She presented to GYN with postmenopausal bleeding in 12/2019. She had not seen a gynecologist in 27 years and had significant history of severe vulvodynia and resultant refusal of pelvic exams. She noted intermittent postmenopausal spotting for greater than 2 years. Occurred once in 2018, once in 2019 then returned in Jan 2020 almost every day.  2/25/20 Pelvic US:  uterus- left fundal leiomyoma measuring 3.9 x 2.5 x 4.4 cm, and another right-sided leiomyoma measures 2.3 x 2 x 2.7 cm.  3/5/2020- Pelvic MRI:   uterus-  several leiomyomata including 2 pedunculated left- sided leiomyoma measuring 3.2 x and 2.5 cm,  as well as a fundal subserosal leiomyoma measuring 2.8 cm, with an apparent pedunculated 2.5 cm.  No pelvic lymphadenopathy.  D&C 6/12/20:  grade 2 endometrial cancer with clear cell features. A robotic TLH/BSO on 6/22/20 revealed a 3.5 cm endometrioid adenocarcinoma without myometrial invasion and 4 negative pelvic lymph nodes. The pelvic washings were negative for malignant cells. MMR: loss of nuclear expression of MLH1/PMS2; methylation detected, indicative of sporadic mutation. No adjuvant therapy was indicated.  She had a postop checkup due to complaints of vaginal spotting on 12/8/2020. A 3 cm vaginal cuff lesion was noted and biopsy was performed and a friable mass was seen. Pathology showed a clear cell carcinoma.  She was deemed not a surgical candidate and is s/p EBRT with Dr. Katherine Dickerson with plans for brachytherapy as well.    [de-identified] : PMH:  She has significant history of anxiety and severe vulvodynia and resultant refusal of pelvic exams as well as sexual dysfunction.\par PSH: lumpectomy, multiple vulvar surgeries due to vulvodynia (Bartholin gland, hymenal tissue/scar tissue removed) under care of Dr. Jefferson Downs at [place]. \par FH:: father- mesothelioma, brother- bladder cancer, maternal grandmother with breast and colon cancer, maternal great aunt with breast, maternal second cousin with breast cancer.\par HM:  Mammo/ SBE/ CBE- followed by Dr. Briscoe (diffuse cystic mastopathy) - [facility] 9/2020 BIRADS2\par EGD- 10/2016- hiatal hernia\par Colonoscopy/EGD- 10/2016- negative per patient; had f/u 8/2020 which had a benign polyp per patient \par \par  [de-identified] : She has completed EBRT in the last week of February and tolerated it well, except for vulvar irritation.  she had a PET 6/4/2021 which showed minimal soft tissue thickening in the region of the left vaginal cuff which is non-FDG avid, unchanged since March 23, 2021 and considerably decreased as compared to CT December 24, 2020.  No evidence of FDG avid distant metastatic disease.\par \par She has occasional spotting that stopped ~ 2-3 weeks and resumed mid March, with occasional spotting. She denies any other associated signs and symptoms. She reports chronic right hip pain.  She continues to have spotting and vaginal bleeding on a daily basis, intermittent vulvodynia.

## 2021-06-29 ENCOUNTER — APPOINTMENT (OUTPATIENT)
Dept: GYNECOLOGIC ONCOLOGY | Facility: CLINIC | Age: 61
End: 2021-06-29
Payer: COMMERCIAL

## 2021-06-29 VITALS
BODY MASS INDEX: 21.82 KG/M2 | HEART RATE: 85 BPM | WEIGHT: 139 LBS | DIASTOLIC BLOOD PRESSURE: 88 MMHG | HEIGHT: 67 IN | SYSTOLIC BLOOD PRESSURE: 127 MMHG

## 2021-06-29 DIAGNOSIS — C54.1 MALIGNANT NEOPLASM OF ENDOMETRIUM: ICD-10-CM

## 2021-06-29 DIAGNOSIS — Z87.42 PERSONAL HISTORY OF OTHER DISEASES OF THE FEMALE GENITAL TRACT: ICD-10-CM

## 2021-06-29 PROCEDURE — 99213 OFFICE O/P EST LOW 20 MIN: CPT

## 2021-07-01 ENCOUNTER — APPOINTMENT (OUTPATIENT)
Dept: RADIATION ONCOLOGY | Facility: CLINIC | Age: 61
End: 2021-07-01

## 2021-07-01 ENCOUNTER — NON-APPOINTMENT (OUTPATIENT)
Age: 61
End: 2021-07-01

## 2021-07-16 ENCOUNTER — APPOINTMENT (OUTPATIENT)
Dept: RADIATION ONCOLOGY | Facility: CLINIC | Age: 61
End: 2021-07-16
Payer: COMMERCIAL

## 2021-07-16 VITALS
HEIGHT: 67 IN | HEART RATE: 70 BPM | SYSTOLIC BLOOD PRESSURE: 125 MMHG | WEIGHT: 139 LBS | RESPIRATION RATE: 16 BRPM | BODY MASS INDEX: 21.82 KG/M2 | DIASTOLIC BLOOD PRESSURE: 70 MMHG

## 2021-07-16 PROCEDURE — 99213 OFFICE O/P EST LOW 20 MIN: CPT

## 2021-07-16 NOTE — REVIEW OF SYSTEMS
[Fatigue] : fatigue [Palpitations] : palpitations [Insomnia] : insomnia [Anxiety] : anxiety [Negative] : Allergic/Immunologic

## 2021-07-16 NOTE — PHYSICAL EXAM
[Normal] : normoactive bowel sounds, soft and nontender, no hepatosplenomegaly or masses appreciated [de-identified] : declined today since recently performed

## 2021-07-16 NOTE — HISTORY OF PRESENT ILLNESS
[FreeTextEntry1] : The patient is 60 yo nulligravida postmenopausal female with locally recurrent endometrial cancer. She presented with postmenopausal bleeding in 12/2020. She had not seen a gynecologist in 27 years and has significant history of severe vulvodynia and resultant refusal of pelvic exams.\par She noted intermittent postmenopausal spotting for greater than 2 years. Occurred once in 2018, once in 2019 then returned in Jan 2020 and is now occurring almost every day. 2/25/2020- Pelvic US- TA views only showed uterus- 8.9 x 4 x 5.6 cm, the myometrium is homogenous. There was a left fundal leiomyoma measuring 3.9 x 2.5 x 4.4 cm, and another right-sided leiomyoma measures 2.3 x 2 x 2.7 cm EMS- 13 mm RTO/LTO- WNL\par 3/5/2020- Pelvic MRI revealed uterus- 9.1 x 5.2 x 3.4 cm, several leiomyomata including 2 pedunculated left- sided leiomyoma to measuring 3.2 x and 2.5 cm, respectively as well as a fundal subserosal leiomyoma measuring 2.8 cm EMS- 10 mm, thickened with minimal heterogenous enhancement RTO- WNL with a apparent pedunculated 2.5 cm simple cyst with proteinaceous material and/or chronic hemorrhage noted in retrospect on prior US 11/8/18.  LTO- WNL No pelvic lymphadenopathy.\par Diagnostic D&C done 6/12/2020 showed grade 2 endometrial cancer with clear cell features. Robotic TLH/BSO  done on 6/22/2020. Pathology revealed 3.5 cm endometrioid adenocarcinoma without myometrial invasion and 4 negative pelvic lymph nodes. The  pelvic washings were negative for malignant cells. MMR: loss of nuclear expression of MLH1/PMS2; methylation detected, indicative of sporadic mutation. No adjuvant therapy was indicated.\par \par She had a postop checkup due to complaints of vaginal spotting on 12/8/2020. A 3 cm vaginal cuff lesion was noted and biopsy was performed and a friable mass was seen. Pathology showed a clear cell carcinoma. She continues to have spotting and vaginal bleeding on a daily basis, intermittent vulvodynia. Very anxious about the diagnosis.She was deemed not a surgical candidate and presents today to discuss the role of radiation treatment in her care with her  on the telephone. She completed a dose of 5040 to the pelvis on 2/26/21, and a vaginal boost 2500 cGy on 4/12/21. Total dose= 6540 cGy.\par \par She presents for a one month PTE. He saw Dr. Strickland on 5/4/21 for continued vulvodynia. Currently taking gabapentin 300 mg in the morning, 300 mg in the afternoon, and 600 mg at night with relief. Fatigue noted. Denies vaginal bleeding or pain. PET/CT 6/22/21 revealed Minimal soft tissue thickening in the region of the left vaginal cuff non-FDG avid, unchanged since March 23, 2021 and considerably decreased as compared to CT December 24, 2020.  No evidence of FDG avid distant metastatic disease. Saw Dr. Motnoya 6/28 and exam was wnl. She notes stress especially due to her mother's hospitalization. She denies GI symptoms (diarrhea or constipation), urinary problems or vaginal bleeding/discharge. She is back to eating a normal diet.\par \par

## 2021-08-06 NOTE — HISTORY OF PRESENT ILLNESS
[FreeTextEntry1] : The patient is 62 yo nulligravida postmenopausal female with locally recurrent endometrial cancer. She presented with postmenopausal bleeding in 12/2020. She had not seen a gynecologist in 27 years and has significant history of severe vulvodynia and resultant refusal of pelvic exams.\par She noted intermittent postmenopausal spotting for greater than 2 years. Occurred once in 2018, once in 2019 then returned in Jan 2020 and is now occurring almost every day. 2/25/2020- Pelvic US- TA views only showed uterus- 8.9 x 4 x 5.6 cm, the myometrium is homogenous. There was a left fundal leiomyoma measuring 3.9 x 2.5 x 4.4 cm, and another right-sided leiomyoma measures 2.3 x 2 x 2.7 cm EMS- 13 mm RTO/LTO- WNL\par 3/5/2020- Pelvic MRI revealed uterus- 9.1 x 5.2 x 3.4 cm, several leiomyomata including 2 pedunculated left- sided leiomyoma to measuring 3.2 x and 2.5 cm, respectively as well as a fundal subserosal leiomyoma measuring 2.8 cm EMS- 10 mm, thickened with minimal heterogenous enhancement RTO- WNL with a apparent pedunculated 2.5 cm simple cyst with proteinaceous material and/or chronic hemorrhage noted in retrospect on prior US 11/8/18.  LTO- WNL No pelvic lymphadenopathy.\par Diagnostic D&C done 6/12/2020 showed grade 2 endometrial cancer with clear cell features. Robotic TLH/BSO  done on 6/22/2020. Pathology revealed 3.5 cm endometrioid adenocarcinoma without myometrial invasion and 4 negative pelvic lymph nodes. The  pelvic washings were negative for malignant cells. MMR: loss of nuclear expression of MLH1/PMS2; methylation detected, indicative of sporadic mutation. No adjuvant therapy was indicated.\par \par She had a postop checkup due to complaints of vaginal spotting on 12/8/2020. A 3 cm vaginal cuff lesion was noted and biopsy was performed and a friable mass was seen. Pathology showed a clear cell carcinoma. She continues to have spotting and vaginal bleeding on a daily basis, intermittent vulvodynia. Very anxious about the diagnosis.She was deemed not a surgical candidate and presents today to discuss the role of radiation treatment in her care with her  on the telephone. She completed a dose of 5040 to the pelvis on 2/26/21, and a vaginal boost 2500 cGy on 4/12/21. \par \par She presents today for a 4 month follow up since completion of radiation to the pelvis and 2 month since vaginal boost. Seeing Dr. Strickland for vulvodynia. Currently taking gabapentin 300 mg in the morning, 300 mg in the afternoon, and 600 mg at night with relief. Fatigue noted. Denies vaginal bleeding or pain. PET scan performed 6/14/21 showed an unchanged minimal soft tissue thickening in the region of the left vaginal cuff. No evidence of FDG avid distant mets.

## 2021-09-21 ENCOUNTER — APPOINTMENT (OUTPATIENT)
Dept: RADIATION ONCOLOGY | Facility: CLINIC | Age: 61
End: 2021-09-21
Payer: COMMERCIAL

## 2021-09-21 VITALS
BODY MASS INDEX: 22.6 KG/M2 | DIASTOLIC BLOOD PRESSURE: 70 MMHG | OXYGEN SATURATION: 99 % | HEART RATE: 90 BPM | SYSTOLIC BLOOD PRESSURE: 106 MMHG | RESPIRATION RATE: 17 BRPM | HEIGHT: 67 IN | WEIGHT: 144 LBS

## 2021-09-21 PROCEDURE — 99212 OFFICE O/P EST SF 10 MIN: CPT

## 2021-09-21 NOTE — VITALS
[Maximal Pain Intensity: 2/10] : 2/10 [80: Normal activity with effort; some signs or symptoms of disease.] : 80: Normal activity with effort; some signs or symptoms of disease.

## 2021-09-21 NOTE — PHYSICAL EXAM
[Normal] : normoactive bowel sounds, soft and nontender, no hepatosplenomegaly or masses appreciated [Thin] : thin

## 2021-09-21 NOTE — HISTORY OF PRESENT ILLNESS
[FreeTextEntry1] : The patient is 60 yo nulligravida postmenopausal female with locally recurrent endometrial cancer. She presented with postmenopausal bleeding in 12/2020. She had not seen a gynecologist in 27 years and has significant history of severe vulvodynia and resultant refusal of pelvic exams.\par She noted intermittent postmenopausal spotting for greater than 2 years. Occurred once in 2018, once in 2019 then returned in Jan 2020 and is now occurring almost every day. 2/25/2020- Pelvic US- TA views only showed uterus- 8.9 x 4 x 5.6 cm, the myometrium is homogenous. There was a left fundal leiomyoma measuring 3.9 x 2.5 x 4.4 cm, and another right-sided leiomyoma measures 2.3 x 2 x 2.7 cm EMS- 13 mm RTO/LTO- WNL\par 3/5/2020- Pelvic MRI revealed uterus- 9.1 x 5.2 x 3.4 cm, several leiomyomata including 2 pedunculated left- sided leiomyoma to measuring 3.2 x and 2.5 cm, respectively as well as a fundal subserosal leiomyoma measuring 2.8 cm EMS- 10 mm, thickened with minimal heterogenous enhancement RTO- WNL with a apparent pedunculated 2.5 cm simple cyst with proteinaceous material and/or chronic hemorrhage noted in retrospect on prior US 11/8/18.  LTO- WNL No pelvic lymphadenopathy.\par Diagnostic D&C done 6/12/2020 showed grade 2 endometrial cancer with clear cell features. Robotic TLH/BSO  done on 6/22/2020. Pathology revealed 3.5 cm endometrioid adenocarcinoma without myometrial invasion and 4 negative pelvic lymph nodes. The  pelvic washings were negative for malignant cells. MMR: loss of nuclear expression of MLH1/PMS2; methylation detected, indicative of sporadic mutation. No adjuvant therapy was indicated.\par \par She had a postop checkup due to complaints of vaginal spotting on 12/8/2020. A 3 cm vaginal cuff lesion was noted and biopsy was performed and a friable mass was seen. Pathology showed a clear cell carcinoma. She continues to have spotting and vaginal bleeding on a daily basis, intermittent vulvodynia. Very anxious about the diagnosis.She was deemed not a surgical candidate and presents today to discuss the role of radiation treatment in her care with her  on the telephone. She completed a dose of 5040 to the pelvis on 2/26/21, and a vaginal boost 2500 cGy on 4/12/21. Total dose= 6540 cGy.\par \par She presents for a one month PTE. He saw Dr. Strickland on 5/4/21 for continued vulvodynia. Currently taking gabapentin 300 mg in the morning, 300 mg in the afternoon, and 600 mg at night with relief. Fatigue noted. Denies vaginal bleeding or pain. PET/CT 6/22/21 revealed Minimal soft tissue thickening in the region of the left vaginal cuff non-FDG avid, unchanged since March 23, 2021 and considerably decreased as compared to CT December 24, 2020.  No evidence of FDG avid distant metastatic disease. Saw Dr. Montoya 6/28 and exam was wnl. She notes stress especially due to her mother's hospitalization. She denies GI symptoms (diarrhea or constipation), urinary problems or vaginal bleeding/discharge. She is back to eating a normal diet.\par \par She requested a visit today as she is concerned about vaginal dilator use. She has only tried it once and developed pain for days afterwards. She is not sure if she is using it correctly and noted spotting after using the dilator. She was provided with and XS (smallest size available).She is scheduled for a routine PET scan tomorrow.

## 2021-09-21 NOTE — REVIEW OF SYSTEMS
[Urinary Tract Pain: Grade 1 - Mild pain] : Urinary Tract Pain: Grade 1 - Mild pain [Dyspareunia: Grade 1 - Mild discomfort or pain associated with vaginal penetration; discomfort relieved with use of vaginal lubricants or estrogen] : Dyspareunia: Grade 1 - Mild discomfort or pain associated with vaginal penetration; discomfort relieved with use of vaginal lubricants or estrogen

## 2021-09-22 ENCOUNTER — OUTPATIENT (OUTPATIENT)
Dept: OUTPATIENT SERVICES | Facility: HOSPITAL | Age: 61
LOS: 1 days | End: 2021-09-22
Payer: COMMERCIAL

## 2021-09-22 ENCOUNTER — APPOINTMENT (OUTPATIENT)
Dept: NUCLEAR MEDICINE | Facility: CLINIC | Age: 61
End: 2021-09-22
Payer: COMMERCIAL

## 2021-09-22 DIAGNOSIS — Z98.890 OTHER SPECIFIED POSTPROCEDURAL STATES: Chronic | ICD-10-CM

## 2021-09-22 DIAGNOSIS — C54.1 MALIGNANT NEOPLASM OF ENDOMETRIUM: ICD-10-CM

## 2021-09-22 DIAGNOSIS — N90.7 VULVAR CYST: Chronic | ICD-10-CM

## 2021-09-22 PROCEDURE — 78815 PET IMAGE W/CT SKULL-THIGH: CPT

## 2021-09-22 PROCEDURE — 78815 PET IMAGE W/CT SKULL-THIGH: CPT | Mod: 26,PS

## 2021-09-22 PROCEDURE — A9552: CPT

## 2021-09-28 ENCOUNTER — OUTPATIENT (OUTPATIENT)
Dept: OUTPATIENT SERVICES | Facility: HOSPITAL | Age: 61
LOS: 1 days | Discharge: HOME | End: 2021-09-28

## 2021-09-28 DIAGNOSIS — N90.7 VULVAR CYST: Chronic | ICD-10-CM

## 2021-09-28 DIAGNOSIS — N93.9 ABNORMAL UTERINE AND VAGINAL BLEEDING, UNSPECIFIED: ICD-10-CM

## 2021-09-28 DIAGNOSIS — Z98.890 OTHER SPECIFIED POSTPROCEDURAL STATES: Chronic | ICD-10-CM

## 2021-10-01 ENCOUNTER — APPOINTMENT (OUTPATIENT)
Dept: HEMATOLOGY ONCOLOGY | Facility: CLINIC | Age: 61
End: 2021-10-01
Payer: COMMERCIAL

## 2021-10-01 PROCEDURE — 99443: CPT

## 2021-10-01 NOTE — HISTORY OF PRESENT ILLNESS
[Disease: _____________________] : Disease: [unfilled] [Home] : at home, [unfilled] , at the time of the visit. [Medical Office: ([facility])___] : at the medical office located in  [Verbal consent obtained from patient] : the patient, [unfilled] [de-identified] : The patient was diagnosed with endometrial cancer in June 2020 at the age of 60.  She presented to GYN with postmenopausal bleeding in 12/2019. She had not seen a gynecologist in 27 years and had significant history of severe vulvodynia and resultant refusal of pelvic exams. She noted intermittent postmenopausal spotting for greater than 2 years. Occurred once in 2018, once in 2019 then returned in Jan 2020 almost every day.  2/25/20 Pelvic US:  uterus- left fundal leiomyoma measuring 3.9 x 2.5 x 4.4 cm, and another right-sided leiomyoma measures 2.3 x 2 x 2.7 cm.  3/5/2020- Pelvic MRI:   uterus-  several leiomyomata including 2 pedunculated left- sided leiomyoma measuring 3.2 x and 2.5 cm,  as well as a fundal subserosal leiomyoma measuring 2.8 cm, with an apparent pedunculated 2.5 cm.  No pelvic lymphadenopathy.  D&C 6/12/20:  grade 2 endometrial cancer with clear cell features. A robotic TLH/BSO on 6/22/20 revealed a 3.5 cm endometrioid adenocarcinoma without myometrial invasion and 4 negative pelvic lymph nodes. The pelvic washings were negative for malignant cells. MMR: loss of nuclear expression of MLH1/PMS2; methylation detected, indicative of sporadic mutation. No adjuvant therapy was indicated.  She had a postop checkup due to complaints of vaginal spotting on 12/8/2020. A 3 cm vaginal cuff lesion was noted and biopsy was performed and a friable mass was seen. Pathology showed a clear cell carcinoma.  She was deemed not a surgical candidate and is s/p EBRT with Dr. Katherine Dickerson with plans for brachytherapy as well.    [de-identified] : PMH:  She has significant history of anxiety and severe vulvodynia and resultant refusal of pelvic exams as well as sexual dysfunction.\par PSH: lumpectomy, multiple vulvar surgeries due to vulvodynia (Bartholin gland, hymenal tissue/scar tissue removed) under care of Dr. Jefferson Downs at [place]. \par FH:: father- mesothelioma, brother- bladder cancer, maternal grandmother with breast and colon cancer, maternal great aunt with breast, maternal second cousin with breast cancer.\par HM:  Mammo/ SBE/ CBE- followed by Dr. Briscoe (diffuse cystic mastopathy) - [facility] 9/2020 BIRADS2\par EGD- 10/2016- hiatal hernia\par Colonoscopy/EGD- 10/2016- negative per patient; had f/u 8/2020 which had a benign polyp per patient \par \par  [de-identified] : She has completed EBRT in the last week of February and tolerated it well, except for vulvar irritation.  she had a PET 6/4/2021 which showed minimal soft tissue thickening in the region of the left vaginal cuff which is non-FDG avid, unchanged since March 23, 2021 and considerably decreased as compared to CT December 24, 2020.  No evidence of FDG avid distant metastatic disease.\par \par She has occasional spotting that stopped ~ 2-3 weeks and resumed mid March, with occasional spotting. She denies any other associated signs and symptoms. She reports chronic right hip pain.  She continues to have spotting and vaginal bleeding on a daily basis, intermittent vulvodynia.

## 2021-10-01 NOTE — RESULTS/DATA
[FreeTextEntry1] : 9/22/21 PET: 1.  New moderate right hydronephrosis and right hydroureter to the level of the UVJ, probably secondary to an obstructive right UVJ/distal ureteral calculus. Urologic consultation is suggested. No definite FDG avid soft tissue region of the right vaginal cuff or right pelvis to explain the new right hydronephrosis/right hydroureter.\par 2.  Postsurgical changes of hysterectomy and bilateral salpingo-oophorectomy. Fiducial markers in the left vaginal cuff, with unchanged, minimal non-FDG avid soft tissue prominence.\par 3.  No scan evidence of FDG avid locoregional or distant metastatic disease.\par \par 6/22/2021 PET/CT - Minimal soft tissue thickening in the region of the left vaginal cuff which is non-FDG avid, unchanged since March 23, 2021 and considerably decreased as compared to CT December 24, 2020.  No evidence of FDG avid distant metastatic disease.\par \par 3/24/21 CT Chest:  SHAINA\par \par 3/23/21 MRI Pelvis:  Status post hysterectomy previously demonstrated patchy cuff lesion today measures 1.5 x 1.9 x 1.3 cm in size with peripheral enhancement and possible central necrosis. This represents a decrease in size from prior CT scan where it measured 3.0 x 3.1 cm\par \par 12/24/20 CT C/A/P:  3.1 cm solid mass adjacent to the left vaginal apex suspicious for recurrent disease.  No evidence of metastatic disease in the thorax or abdomen.\par \par 12/22/20 path:  Vaginal cuff mass, biopsy:\par  - Clear cell carcinoma.  The tumor cells are focally positive for Napsin, and ER, and negative for CO, supporting the above diagnosis\par \par 6/10/20 Pathology:\par Uterus, cervix, bilateral fallopian tubes and ovaries, total hysterectomy and bilateral salpingo-oophorectomy:  Endometrial endometrioid carcinoma (FIGO I).  Tumor is extending into the adenomyosis foci.  Leiomyomas.  Adenomyosis. Unremarkable cervix.  Unremarkable bilateral ovaries and fallopian tubes\par Lymph node, pelvic sentinel, right, excision: Two lymph nodes negative for tumor (0/2) ( H and E and immunostain AE1-AE3)\par Uterus, lower uterine segment fibroid, removal:  Leiomyoma with degenerative changes\par Fallopian tube,  paratubal cyst, right, removal:  Unremarkable fallopian tube with microcalcifications. Paratubal cysts\par Lymph node, pelvic sentinel, left, excision:  Four  lymph nodes negative for tumor (0/7)( H and E and immunostain AE1-AE3)\par Comment :  Final grading of the tumor is grade II in correlation with patient prior biopsy. Of note, no myometrial invasion is identified, what was seen in the original frozen section slide is best interpreted as involvement / extension of the tumor into the foci of adenomyosis rather than myometrial invasion.\par Synoptic Summary\par Procedure:  Total hysterectomy and bilateral salpingo-oophorectomy\par Specimen Integrity:   Intact hysterectomy\par Tumor Site:   Anterior endometrium and Posterior endometrium\par Tumor Size: 3.5  cm ( greatest dimension)\par Histologic Type:  Endometrioid adenocarcinoma\par Histologic Grade:   FIGO grade II\par Myometrial Invasion:   Not present\par Adenomyosis: present - involved by carcinoma\par Uterine Serosa Involvement:   Not identified\par Lower Uterine Segment Involvement:  Not identified\par Cervical Stromal Involvement:   Not involved\par Other Tissue/Organs involvement:   Not involved\par Peritoneal or Ascitic Fluid:  Please see result (60-YV-)\par Margins:    Not applicable\par Lymphovascular Invasion:   Not identified\par Regional Lymph Nodes:\par Pelvic Lymph nodes:\par Number of Pelvic Nodes with Macrometastasis (>2mm):  0\par Number of Pelvic Nodes with Micrometastasis (>0.2 mm up to 2mm): 0\par Number of Pelvic Nodes with Isolated Tumor Cells (0.2 mm or less): 0\par Laterality of Pelvic Nodes with Tumor: N/A\par Total Number of Pelvic Nodes Examined (sentinel and non-sentinel): 6\par Number of Pelvic [place] Nodes Examined: 6\par Laterality of Pelvic Nodes Examined: Left and right\par Para-aortic Lymph nodes:  No lymph nodes submitted\par Pathologic Stage Classification (pTNM, AJCC 8th Edition)\par TNM descriptors : N/A\par Primary Tumor (pT):  pT1a\par Regional Lymph Nodes (pN) or pN (sn):   pN0\par Distant Metastasis (pM):  pM0\par FIGO Stage (2015 FIGO Cancer Report):  Ia\par Additional Pathologic Findings:    None\par Clinical History:   None\par Ancillary Studies:  (MMR IHC)\par Immunohistochemistry Testing (IHC) for Mismatch Repair Proteins performed on tissue block 1H shows the following result:\par MLH1 : Loss of nuclear expression\par MSH2 :  Intact nuclear expression\par MSH6:  Intact nuclear expression\par PMS2 : Loss of nuclear expression\par Interpretation: There is a loss of nuclear expression in MLH1 and PMS2, while MSH2 and MSH6 show intact nuclear expression. Loss of nuclear expression of MLH1 and PMS2 is supportive of DNA mismatch repair deficiency and high frequency of microsatellite instability (MSI-H).\par \par 6/10/20 Pathology:  Endocervix curettings - Fragments of endometrial adenocarcinoma morphologically similar to part 2.  Scant benign cervical tissue \par Endometrial curettings - Endometrial endometrioid adenocarcinoma with focal squamous and mucinous differentiation, FIGO grade 2 (see comment)\par Comment: Focal areas of tumor with clear cell morphology are seen admixed with fragments of endometrioid adenocarcinoma.

## 2021-10-01 NOTE — ASSESSMENT
[FreeTextEntry1] : Most relapses that occur among patients without prior radiation therapy are vaginal recurrences. Data regarding treatment of isolated vaginal relapse are limited, but outcomes appear favorable based on observational studies with RT and the few studies of surgical treatment for this patient population. In the PORTEC-1 trial, among 30 women without prior RT who experienced an isolated vaginal recurrence, the complete response rate to curative-intent treatment was 87 percent.  For most women (28 patients), curative-intent treatment consisted of whole-pelvic RT, with or without brachytherapy, although one of these patients also received surgery, and two were also treated with endocrine therapy.  \par \par For patients with extravaginal extension or pelvic lymph node involvement, the prognosis is worse. In PORTEC-1, only 4 of 10 patients who were treated for pelvic relapse with RT with curative intent reached complete remission.\par There is an ongoing [facility] trial 238, which randomly assigns these patients to RT with or without concurrent sensitizing cisplatin but is still considered investigational.  \par \par For patients treated with curative intent for locoregional recurrence, surveillance is necessary after treatment. Per the NCCN guidelines, CT CAP every six months for the first three years and then every 6 to 12 months for the following two years is recommended.

## 2021-10-29 ENCOUNTER — NON-APPOINTMENT (OUTPATIENT)
Age: 61
End: 2021-10-29

## 2021-11-01 ENCOUNTER — NON-APPOINTMENT (OUTPATIENT)
Age: 61
End: 2021-11-01

## 2021-11-02 ENCOUNTER — APPOINTMENT (OUTPATIENT)
Dept: GYNECOLOGIC ONCOLOGY | Facility: CLINIC | Age: 61
End: 2021-11-02
Payer: COMMERCIAL

## 2021-11-02 VITALS
HEART RATE: 94 BPM | SYSTOLIC BLOOD PRESSURE: 134 MMHG | WEIGHT: 135 LBS | BODY MASS INDEX: 21.19 KG/M2 | HEIGHT: 67 IN | DIASTOLIC BLOOD PRESSURE: 84 MMHG

## 2021-11-02 PROCEDURE — 99213 OFFICE O/P EST LOW 20 MIN: CPT

## 2021-11-02 RX ORDER — GABAPENTIN 300 MG/1
300 CAPSULE ORAL
Qty: 120 | Refills: 1 | Status: DISCONTINUED | COMMUNITY
Start: 2021-05-04 | End: 2021-11-02

## 2021-11-17 ENCOUNTER — LABORATORY RESULT (OUTPATIENT)
Age: 61
End: 2021-11-17

## 2021-11-17 ENCOUNTER — APPOINTMENT (OUTPATIENT)
Dept: FAMILY MEDICINE | Facility: CLINIC | Age: 61
End: 2021-11-17
Payer: COMMERCIAL

## 2021-11-17 VITALS
TEMPERATURE: 97.7 F | SYSTOLIC BLOOD PRESSURE: 130 MMHG | OXYGEN SATURATION: 98 % | HEART RATE: 86 BPM | WEIGHT: 135 LBS | HEIGHT: 67 IN | BODY MASS INDEX: 21.19 KG/M2 | DIASTOLIC BLOOD PRESSURE: 82 MMHG

## 2021-11-17 LAB
BILIRUB UR QL STRIP: NEGATIVE
GLUCOSE UR-MCNC: NEGATIVE
HCG UR QL: 0.2 EU/DL
HGB UR QL STRIP.AUTO: NEGATIVE
KETONES UR-MCNC: NEGATIVE
LEUKOCYTE ESTERASE UR QL STRIP: NORMAL
NITRITE UR QL STRIP: NEGATIVE
PH UR STRIP: 5.5
PROT UR STRIP-MCNC: NEGATIVE
SP GR UR STRIP: 1.01

## 2021-11-17 PROCEDURE — 96127 BRIEF EMOTIONAL/BEHAV ASSMT: CPT

## 2021-11-17 PROCEDURE — 99214 OFFICE O/P EST MOD 30 MIN: CPT | Mod: 25

## 2021-11-17 PROCEDURE — 81003 URINALYSIS AUTO W/O SCOPE: CPT | Mod: QW

## 2021-11-17 NOTE — PHYSICAL EXAM
[Normal] : normal rate, regular rhythm, normal S1 and S2 and no murmur heard [No Edema] : there was no peripheral edema [de-identified] : See H&P

## 2021-11-17 NOTE — HISTORY OF PRESENT ILLNESS
[FreeTextEntry8] : Chronic recurrent right lower quadrant abdominal pain.  Recently diagnosed with obstructive uropathy due to you UV junction stone which has been removed.  Recent PET scan was remarkable only for endometrial cancer tumor shrinkage of cervix and stone.  Urinalysis shows small amount of leukocytes no blood.  Recent GYN exam unremarkable.  Patient is status post radiation for recurrence of endometrial cancer the cervical cuff.  Patient also experiences some urinary urgency\par \par No significant right CVA tenderness abdomen is soft with right sided tenderness on superficial palpation

## 2021-11-17 NOTE — ASSESSMENT
[FreeTextEntry1] : Possible right renal calculus recurrence will check ultrasound of kidney and bladder and/or refer back to urologist\par \par Recent blood work normal\par \par Declines flu shot at this time

## 2021-11-18 ENCOUNTER — APPOINTMENT (OUTPATIENT)
Dept: ULTRASOUND IMAGING | Facility: CLINIC | Age: 61
End: 2021-11-18
Payer: COMMERCIAL

## 2021-11-18 ENCOUNTER — OUTPATIENT (OUTPATIENT)
Dept: OUTPATIENT SERVICES | Facility: HOSPITAL | Age: 61
LOS: 1 days | End: 2021-11-18

## 2021-11-18 DIAGNOSIS — Z00.8 ENCOUNTER FOR OTHER GENERAL EXAMINATION: ICD-10-CM

## 2021-11-18 DIAGNOSIS — Z98.890 OTHER SPECIFIED POSTPROCEDURAL STATES: Chronic | ICD-10-CM

## 2021-11-18 DIAGNOSIS — N90.7 VULVAR CYST: Chronic | ICD-10-CM

## 2021-11-18 PROCEDURE — 76770 US EXAM ABDO BACK WALL COMP: CPT | Mod: 26

## 2021-11-19 LAB
APPEARANCE: CLEAR
BILIRUBIN URINE: NEGATIVE
BLOOD URINE: NEGATIVE
COLOR: NORMAL
GLUCOSE QUALITATIVE U: NEGATIVE
KETONES URINE: NEGATIVE
LEUKOCYTE ESTERASE URINE: ABNORMAL
NITRITE URINE: NEGATIVE
PH URINE: 5
PROTEIN URINE: NEGATIVE
SPECIFIC GRAVITY URINE: 1.01
UROBILINOGEN URINE: NORMAL

## 2021-12-14 ENCOUNTER — RESULT REVIEW (OUTPATIENT)
Age: 61
End: 2021-12-14

## 2021-12-16 ENCOUNTER — NON-APPOINTMENT (OUTPATIENT)
Age: 61
End: 2021-12-16

## 2021-12-16 ENCOUNTER — APPOINTMENT (OUTPATIENT)
Dept: RADIATION ONCOLOGY | Facility: CLINIC | Age: 61
End: 2021-12-16
Payer: COMMERCIAL

## 2021-12-16 VITALS
HEIGHT: 67 IN | DIASTOLIC BLOOD PRESSURE: 74 MMHG | HEART RATE: 88 BPM | OXYGEN SATURATION: 98 % | BODY MASS INDEX: 21.35 KG/M2 | SYSTOLIC BLOOD PRESSURE: 120 MMHG | RESPIRATION RATE: 17 BRPM | WEIGHT: 136 LBS

## 2021-12-16 PROCEDURE — 99214 OFFICE O/P EST MOD 30 MIN: CPT | Mod: 25

## 2021-12-16 NOTE — PHYSICAL EXAM
[Thin] : thin [Bowel Sounds] : normal bowel sounds [Abdomen Soft] : soft [Abdomen Tenderness] : non-tender [Axillary Lymph Nodes Enlarged Bilaterally] : axillary [Supraclavicular Lymph Nodes Enlarged Bilaterally] : supraclavicular [Inguinal Lymph Nodes Enlarged Bilaterally] : inguinal [Normal] : oriented to person, place and time, the affect was normal, the mood was normal and not anxious

## 2021-12-16 NOTE — HISTORY OF PRESENT ILLNESS
[FreeTextEntry1] : The patient is 62 yo nulligravida postmenopausal female with locally recurrent endometrial cancer. She presented with postmenopausal bleeding in 12/2020. She had not seen a gynecologist in 27 years and has significant history of severe vulvodynia and resultant refusal of pelvic exams.\par She noted intermittent postmenopausal spotting for greater than 2 years. Occurred once in 2018, once in 2019 then returned in Jan 2020 and is now occurring almost every day. 2/25/2020- Pelvic US- TA views only showed uterus- 8.9 x 4 x 5.6 cm, the myometrium is homogenous. There was a left fundal leiomyoma measuring 3.9 x 2.5 x 4.4 cm, and another right-sided leiomyoma measures 2.3 x 2 x 2.7 cm EMS- 13 mm RTO/LTO- WNL\par 3/5/2020- Pelvic MRI revealed uterus- 9.1 x 5.2 x 3.4 cm, several leiomyomata including 2 pedunculated left- sided leiomyoma to measuring 3.2 x and 2.5 cm, respectively as well as a fundal subserosal leiomyoma measuring 2.8 cm EMS- 10 mm, thickened with minimal heterogenous enhancement RTO- WNL with a apparent pedunculated 2.5 cm simple cyst with proteinaceous material and/or chronic hemorrhage noted in retrospect on prior US 11/8/18.  LTO- WNL No pelvic lymphadenopathy.\par Diagnostic D&C done 6/12/2020 showed grade 2 endometrial cancer with clear cell features. Robotic TLH/BSO  done on 6/22/2020. Pathology revealed 3.5 cm endometrioid adenocarcinoma without myometrial invasion and 4 negative pelvic lymph nodes. The  pelvic washings were negative for malignant cells. MMR: loss of nuclear expression of MLH1/PMS2; methylation detected, indicative of sporadic mutation. No adjuvant therapy was indicated.\par \par She had a postop checkup due to complaints of vaginal spotting on 12/8/2020. A 3 cm vaginal cuff lesion was noted and biopsy was performed and a friable mass was seen. Pathology showed a clear cell carcinoma. She continues to have spotting and vaginal bleeding on a daily basis, intermittent vulvodynia. Very anxious about the diagnosis.She was deemed not a surgical candidate and presents today to discuss the role of radiation treatment in her care with her  on the telephone. She completed a dose of 5040 to the pelvis on 2/26/21, and a vaginal boost 2500 cGy on 4/12/21. Total dose= 6540 cGy.\par \par She presents for an 8 month follow up. She saw Dr. Strickland on 5/4/21 for continued vulvodynia. Currently taking gabapentin 300 mg in the morning, 300 mg in the afternoon, and 600 mg at night with relief. Fatigue noted. Denies vaginal bleeding or pain.. Saw Dr. Montoya 6/28 and exam was wnl. She notes stress especially due to her mother's hospitalization and death. She reports constipation but denies vaginal bleeding/discharge. She is back to eating a normal diet. She is using her dilator rarely due to discomfort that it causes. PET scan performed 9/22/21 showed new moderate right hydronephrosis and right hydroureter to the level of the UVJ, probably secondary to an obstructive right UVJ/distal ureteral calculus. No definite FDG avid soft tissue region of the right vaginal cuff or right pelvis to explain the new right hydronephrosis/right hydroureter, post surgical changes, and no evidence of metastatic disease. She had stent and kidney stone removed recently, currently reports 3 new kidney stones. C/O constipation but has modified to a low oxalate diet and has cut out many fruits and vegetables. She uses stool softener and MiraLAX. PET was denied by insurance, and scheduled for CTs next week. She is having trouble gaining weight.\par \par \par

## 2021-12-20 ENCOUNTER — APPOINTMENT (OUTPATIENT)
Dept: NUCLEAR MEDICINE | Facility: CLINIC | Age: 61
End: 2021-12-20

## 2021-12-22 ENCOUNTER — APPOINTMENT (OUTPATIENT)
Dept: CT IMAGING | Facility: CLINIC | Age: 61
End: 2021-12-22
Payer: COMMERCIAL

## 2021-12-22 ENCOUNTER — OUTPATIENT (OUTPATIENT)
Dept: OUTPATIENT SERVICES | Facility: HOSPITAL | Age: 61
LOS: 1 days | End: 2021-12-22
Payer: COMMERCIAL

## 2021-12-22 DIAGNOSIS — Z00.8 ENCOUNTER FOR OTHER GENERAL EXAMINATION: ICD-10-CM

## 2021-12-22 DIAGNOSIS — N90.7 VULVAR CYST: Chronic | ICD-10-CM

## 2021-12-22 DIAGNOSIS — C54.1 MALIGNANT NEOPLASM OF ENDOMETRIUM: ICD-10-CM

## 2021-12-22 DIAGNOSIS — Z98.890 OTHER SPECIFIED POSTPROCEDURAL STATES: Chronic | ICD-10-CM

## 2021-12-22 PROCEDURE — 71260 CT THORAX DX C+: CPT

## 2021-12-22 PROCEDURE — 71260 CT THORAX DX C+: CPT | Mod: 26

## 2021-12-22 PROCEDURE — 82565 ASSAY OF CREATININE: CPT

## 2021-12-22 PROCEDURE — 74177 CT ABD & PELVIS W/CONTRAST: CPT

## 2021-12-22 PROCEDURE — 74177 CT ABD & PELVIS W/CONTRAST: CPT | Mod: 26

## 2021-12-28 ENCOUNTER — NON-APPOINTMENT (OUTPATIENT)
Age: 61
End: 2021-12-28

## 2022-01-05 ENCOUNTER — OUTPATIENT (OUTPATIENT)
Dept: OUTPATIENT SERVICES | Facility: HOSPITAL | Age: 62
LOS: 1 days | Discharge: HOME | End: 2022-01-05

## 2022-01-05 DIAGNOSIS — N93.9 ABNORMAL UTERINE AND VAGINAL BLEEDING, UNSPECIFIED: ICD-10-CM

## 2022-01-05 DIAGNOSIS — N90.7 VULVAR CYST: Chronic | ICD-10-CM

## 2022-01-05 DIAGNOSIS — Z98.890 OTHER SPECIFIED POSTPROCEDURAL STATES: Chronic | ICD-10-CM

## 2022-01-06 ENCOUNTER — APPOINTMENT (OUTPATIENT)
Dept: HEMATOLOGY ONCOLOGY | Facility: CLINIC | Age: 62
End: 2022-01-06
Payer: COMMERCIAL

## 2022-01-06 PROCEDURE — 99443: CPT

## 2022-01-06 NOTE — HISTORY OF PRESENT ILLNESS
[Disease: _____________________] : Disease: [unfilled] [Home] : at home, [unfilled] , at the time of the visit. [Medical Office: ([facility])___] : at the medical office located in  [Verbal consent obtained from patient] : the patient, [unfilled] [de-identified] : The patient was diagnosed with endometrial cancer in June 2020 at the age of 60.  She presented to GYN with postmenopausal bleeding in 12/2019. She had not seen a gynecologist in 27 years and had significant history of severe vulvodynia and resultant refusal of pelvic exams. She noted intermittent postmenopausal spotting for greater than 2 years. Occurred once in 2018, once in 2019 then returned in Jan 2020 almost every day.  2/25/20 Pelvic US:  uterus- left fundal leiomyoma measuring 3.9 x 2.5 x 4.4 cm, and another right-sided leiomyoma measures 2.3 x 2 x 2.7 cm.  3/5/2020- Pelvic MRI:   uterus-  several leiomyomata including 2 pedunculated left- sided leiomyoma measuring 3.2 x and 2.5 cm,  as well as a fundal subserosal leiomyoma measuring 2.8 cm, with an apparent pedunculated 2.5 cm.  No pelvic lymphadenopathy.  D&C 6/12/20:  grade 2 endometrial cancer with clear cell features. A robotic TLH/BSO on 6/22/20 revealed a 3.5 cm endometrioid adenocarcinoma without myometrial invasion and 4 negative pelvic lymph nodes. The pelvic washings were negative for malignant cells. MMR: loss of nuclear expression of MLH1/PMS2; methylation detected, indicative of sporadic mutation. No adjuvant therapy was indicated.  She had a postop checkup due to complaints of vaginal spotting on 12/8/2020. A 3 cm vaginal cuff lesion was noted and biopsy was performed and a friable mass was seen. Pathology showed a clear cell carcinoma.  She was deemed not a surgical candidate and is s/p EBRT with Dr. Katherine Dickerson with plans for brachytherapy as well.    [de-identified] : PMH:  She has significant history of anxiety and severe vulvodynia and resultant refusal of pelvic exams as well as sexual dysfunction.\par PSH: lumpectomy, multiple vulvar surgeries due to vulvodynia (Bartholin gland, hymenal tissue/scar tissue removed) under care of Dr. Jefferson Downs at [place]. \par FH:: father- mesothelioma, brother- bladder cancer, maternal grandmother with breast and colon cancer, maternal great aunt with breast, maternal second cousin with breast cancer.\par HM:  Mammo/ SBE/ CBE- followed by Dr. Briscoe (diffuse cystic mastopathy) - [facility] 9/2020 BIRADS2\par EGD- 10/2016- hiatal hernia\par Colonoscopy/EGD- 10/2016- negative per patient; had f/u 8/2020 which had a benign polyp per patient \par \par  [de-identified] : She has completed EBRT in the last week of February and tolerated it well, except for vulvar irritation.  she had a PET 6/4/2021 which showed minimal soft tissue thickening in the region of the left vaginal cuff which is non-FDG avid, unchanged since March 23, 2021 and considerably decreased as compared to CT December 24, 2020.  No evidence of FDG avid distant metastatic disease.\par \par She has occasional spotting that stopped ~ 2-3 weeks and resumed mid March, with occasional spotting. She denies any other associated signs and symptoms. She reports chronic right hip pain.  She continues to have spotting and vaginal bleeding on a daily basis, intermittent vulvodynia.

## 2022-01-06 NOTE — ASSESSMENT
[FreeTextEntry1] : Most relapses that occur among patients without prior radiation therapy are vaginal recurrences. Data regarding treatment of isolated vaginal relapse are limited, but outcomes appear favorable based on observational studies with RT and the few studies of surgical treatment for this patient population. In the PORTEC-1 trial, among 30 women without prior RT who experienced an isolated vaginal recurrence, the complete response rate to curative-intent treatment was 87 percent.  For most women (28 patients), curative-intent treatment consisted of whole-pelvic RT, with or without brachytherapy, although one of these patients also received surgery, and two were also treated with endocrine therapy.  \par \par For patients with extravaginal extension or pelvic lymph node involvement, the prognosis is worse. In PORTEC-1, only 4 of 10 patients who were treated for pelvic relapse with RT with curative intent reached complete remission.\par There is an ongoing [facility] trial 238, which randomly assigns these patients to RT with or without concurrent sensitizing cisplatin but is still considered investigational.  \par \par For patients treated with curative intent for locoregional recurrence, surveillance is necessary after treatment. Per the NCCN guidelines, CT CAP every six months for the first three years and then every 6 to 12 months for the following two years is recommended.

## 2022-01-06 NOTE — RESULTS/DATA
[FreeTextEntry1] : 12/22/21 CT C/A/P: Stable appearance of the chest compared with 3/24/2021. Interval significant decrease in size of previously noted mass at the left vaginal apex since 12/24/2020. Minimal soft tissue prominence is noted in this region as described above. Interval resolution of previously noted right hydroureteronephrosis since PET/CT dated 9/22/2021. Previously noted distal right ureteral calculus is no longer seen.\par \par 9/22/21 PET: 1.  New moderate right hydronephrosis and right hydroureter to the level of the UVJ, probably secondary to an obstructive right UVJ/distal ureteral calculus. Urologic consultation is suggested. No definite FDG avid soft tissue region of the right vaginal cuff or right pelvis to explain the new right hydronephrosis/right hydroureter.\par 2.  Postsurgical changes of hysterectomy and bilateral salpingo-oophorectomy. Fiducial markers in the left vaginal cuff, with unchanged, minimal non-FDG avid soft tissue prominence.\par 3.  No scan evidence of FDG avid locoregional or distant metastatic disease.\par \par 6/22/2021 PET/CT - Minimal soft tissue thickening in the region of the left vaginal cuff which is non-FDG avid, unchanged since March 23, 2021 and considerably decreased as compared to CT December 24, 2020.  No evidence of FDG avid distant metastatic disease.\par \par 3/24/21 CT Chest:  SHAINA\par \par 3/23/21 MRI Pelvis:  Status post hysterectomy previously demonstrated patchy cuff lesion today measures 1.5 x 1.9 x 1.3 cm in size with peripheral enhancement and possible central necrosis. This represents a decrease in size from prior CT scan where it measured 3.0 x 3.1 cm\par \par 12/24/20 CT C/A/P:  3.1 cm solid mass adjacent to the left vaginal apex suspicious for recurrent disease.  No evidence of metastatic disease in the thorax or abdomen.\par \par 12/22/20 path:  Vaginal cuff mass, biopsy:\par  - Clear cell carcinoma.  The tumor cells are focally positive for Napsin, and ER, and negative for [place], supporting the above diagnosis\par \par 6/10/20 Pathology:\par Uterus, cervix, bilateral fallopian tubes and ovaries, total hysterectomy and bilateral salpingo-oophorectomy:  Endometrial endometrioid carcinoma (FIGO I).  Tumor is extending into the adenomyosis foci.  Leiomyomas.  Adenomyosis. Unremarkable cervix.  Unremarkable bilateral ovaries and fallopian tubes\par Lymph node, pelvic sentinel, right, excision: Two lymph nodes negative for tumor (0/2) ( H and E and immunostain AE1-AE3)\par Uterus, lower uterine segment fibroid, removal:  Leiomyoma with degenerative changes\par Fallopian tube,  paratubal cyst, right, removal:  Unremarkable fallopian tube with microcalcifications. Paratubal cysts\par Lymph node, pelvic sentinel, left, excision:  Four  lymph nodes negative for tumor (0/7)( H and E and immunostain AE1-AE3)\par Comment :  Final grading of the tumor is grade II in correlation with patient prior biopsy. Of note, no myometrial invasion is identified, what was seen in the original frozen section slide is best interpreted as involvement / extension of the tumor into the foci of adenomyosis rather than myometrial invasion.\par Synoptic Summary\par Procedure:  Total hysterectomy and bilateral salpingo-oophorectomy\par Specimen Integrity:   Intact hysterectomy\par Tumor Site:   Anterior endometrium and Posterior endometrium\par Tumor Size: 3.5  cm ( greatest dimension)\par Histologic Type:  Endometrioid adenocarcinoma\par Histologic Grade:   FIGO grade II\par Myometrial Invasion:   Not present\par Adenomyosis: present - involved by carcinoma\par Uterine Serosa Involvement:   Not identified\par Lower Uterine Segment Involvement:  Not identified\par Cervical Stromal Involvement:   Not involved\par Other Tissue/Organs involvement:   Not involved\par Peritoneal or Ascitic Fluid:  Please see result ([id number])\par Margins:    Not applicable\par Lymphovascular Invasion:   Not identified\par Regional Lymph Nodes:\par Pelvic Lymph nodes:\par Number of Pelvic Nodes with Macrometastasis (>2mm):  0\par Number of Pelvic Nodes with Micrometastasis (>0.2 mm up to 2mm): 0\par Number of Pelvic Nodes with Isolated Tumor Cells (0.2 mm or less): 0\par Laterality of Pelvic Nodes with Tumor: N/A\par Total Number of Pelvic Nodes Examined (sentinel and non-sentinel): 6\par Number of Pelvic [place] Nodes Examined: 6\par Laterality of Pelvic Nodes Examined: Left and right\par Para-aortic Lymph nodes:  No lymph nodes submitted\par Pathologic Stage Classification (pTNM, AJCC 8th Edition)\par TNM descriptors : N/A\par Primary Tumor (pT):  pT1a\par Regional Lymph Nodes (pN) or pN (sn):   pN0\par Distant Metastasis (pM):  pM0\par FIGO Stage (2015 FIGO Cancer Report):  Ia\par Additional Pathologic Findings:    None\par Clinical History:   None\par Ancillary Studies:  (MMR IHC)\par Immunohistochemistry Testing (IHC) for Mismatch Repair Proteins performed on tissue block 1H shows the following result:\par MLH1 : Loss of nuclear expression\par MSH2 :  Intact nuclear expression\par MSH6:  Intact nuclear expression\par PMS2 : Loss of nuclear expression\par Interpretation: There is a loss of nuclear expression in MLH1 and PMS2, while MSH2 and MSH6 show intact nuclear expression. Loss of nuclear expression of MLH1 and PMS2 is supportive of DNA mismatch repair deficiency and high frequency of microsatellite instability (MSI-H).\par \par 6/10/20 Pathology:  Endocervix curettings - Fragments of endometrial adenocarcinoma morphologically similar to part 2.  Scant benign cervical tissue \par Endometrial curettings - Endometrial endometrioid adenocarcinoma with focal squamous and mucinous differentiation, FIGO grade 2 (see comment)\par Comment: Focal areas of tumor with clear cell morphology are seen admixed with fragments of endometrioid adenocarcinoma.

## 2022-01-12 ENCOUNTER — APPOINTMENT (OUTPATIENT)
Dept: FAMILY MEDICINE | Facility: CLINIC | Age: 62
End: 2022-01-12
Payer: COMMERCIAL

## 2022-01-12 VITALS
OXYGEN SATURATION: 98 % | SYSTOLIC BLOOD PRESSURE: 120 MMHG | TEMPERATURE: 98.2 F | DIASTOLIC BLOOD PRESSURE: 78 MMHG | HEART RATE: 93 BPM | HEIGHT: 67 IN | BODY MASS INDEX: 21.03 KG/M2 | WEIGHT: 134 LBS

## 2022-01-12 LAB
BASOPHILS # BLD AUTO: 0.05 K/UL
BASOPHILS NFR BLD AUTO: 0.8 %
BILIRUB UR QL STRIP: NEGATIVE
CANCER AG125 SERPL-ACNC: 7 U/ML
EOSINOPHIL # BLD AUTO: 0.06 K/UL
EOSINOPHIL NFR BLD AUTO: 1 %
GLUCOSE UR-MCNC: NEGATIVE
HCG UR QL: 0.2 EU/DL
HCT VFR BLD CALC: 46.6 %
HGB BLD-MCNC: 15 G/DL
HGB UR QL STRIP.AUTO: NORMAL
IMM GRANULOCYTES NFR BLD AUTO: 0.5 %
KETONES UR-MCNC: NEGATIVE
LEUKOCYTE ESTERASE UR QL STRIP: NORMAL
LYMPHOCYTES # BLD AUTO: 0.82 K/UL
LYMPHOCYTES NFR BLD AUTO: 13.8 %
MAN DIFF?: NORMAL
MCHC RBC-ENTMCNC: 30.6 PG
MCHC RBC-ENTMCNC: 32.2 GM/DL
MCV RBC AUTO: 95.1 FL
MONOCYTES # BLD AUTO: 0.46 K/UL
MONOCYTES NFR BLD AUTO: 7.8 %
NEUTROPHILS # BLD AUTO: 4.51 K/UL
NEUTROPHILS NFR BLD AUTO: 76.1 %
NITRITE UR QL STRIP: NEGATIVE
PH UR STRIP: 6
PLATELET # BLD AUTO: 336 K/UL
PROT UR STRIP-MCNC: NEGATIVE
RBC # BLD: 4.9 M/UL
RBC # FLD: 12.3 %
SP GR UR STRIP: <=1.005
WBC # FLD AUTO: 5.93 K/UL

## 2022-01-12 PROCEDURE — 99214 OFFICE O/P EST MOD 30 MIN: CPT | Mod: 25

## 2022-01-12 PROCEDURE — 81003 URINALYSIS AUTO W/O SCOPE: CPT | Mod: QW

## 2022-01-12 NOTE — HISTORY OF PRESENT ILLNESS
[FreeTextEntry8] : Right flank/abdominal pain pain is more or less chronic reproduced with rotation of spine reproduced with leg lifts tenderness of rectus abdominalis muscles no change in bowel or bladder history of renal calculi and hydronephrosis has resolved recent CAT scan of abdomen shows shrinking cervical cuff urinalysis shows trace blood\par \par Musculoskeletal abdominal pain Celebrex stretch yoga repeat renal ultrasound to confirm resolution of hydronephrosis

## 2022-01-13 LAB
ALBUMIN SERPL ELPH-MCNC: 4.5 G/DL
ALP BLD-CCNC: 90 U/L
ALT SERPL-CCNC: 28 U/L
ANION GAP SERPL CALC-SCNC: 15 MMOL/L
AST SERPL-CCNC: 21 U/L
BILIRUB SERPL-MCNC: 0.7 MG/DL
BUN SERPL-MCNC: 13 MG/DL
CALCIUM SERPL-MCNC: 9.4 MG/DL
CHLORIDE SERPL-SCNC: 104 MMOL/L
CO2 SERPL-SCNC: 23 MMOL/L
CREAT SERPL-MCNC: 0.73 MG/DL
GLUCOSE SERPL-MCNC: 84 MG/DL
MAGNESIUM SERPL-MCNC: 2.2 MG/DL
POTASSIUM SERPL-SCNC: 4.3 MMOL/L
PROT SERPL-MCNC: 6.6 G/DL
SODIUM SERPL-SCNC: 142 MMOL/L

## 2022-01-14 ENCOUNTER — OUTPATIENT (OUTPATIENT)
Dept: OUTPATIENT SERVICES | Facility: HOSPITAL | Age: 62
LOS: 1 days | End: 2022-01-14
Payer: COMMERCIAL

## 2022-01-14 ENCOUNTER — APPOINTMENT (OUTPATIENT)
Dept: ULTRASOUND IMAGING | Facility: CLINIC | Age: 62
End: 2022-01-14
Payer: COMMERCIAL

## 2022-01-14 DIAGNOSIS — N13.30 UNSPECIFIED HYDRONEPHROSIS: ICD-10-CM

## 2022-01-14 DIAGNOSIS — Z98.890 OTHER SPECIFIED POSTPROCEDURAL STATES: Chronic | ICD-10-CM

## 2022-01-14 DIAGNOSIS — N90.7 VULVAR CYST: Chronic | ICD-10-CM

## 2022-01-14 PROCEDURE — 76775 US EXAM ABDO BACK WALL LIM: CPT | Mod: 26

## 2022-01-14 PROCEDURE — 76775 US EXAM ABDO BACK WALL LIM: CPT

## 2022-01-26 PROBLEM — Z23 ENCOUNTER FOR IMMUNIZATION: Status: ACTIVE | Noted: 2020-10-09

## 2022-01-26 PROBLEM — R30.0 DYSURIA: Status: ACTIVE | Noted: 2020-07-14

## 2022-01-26 PROBLEM — H53.419: Status: ACTIVE | Noted: 2018-03-12

## 2022-01-26 PROBLEM — R06.00 DOE (DYSPNEA ON EXERTION): Status: ACTIVE | Noted: 2019-04-25

## 2022-01-26 PROBLEM — M79.2 NEUROPATHIC PAIN: Status: ACTIVE | Noted: 2021-05-04

## 2022-01-26 PROBLEM — N95.1 HOT FLASHES, MENOPAUSAL: Status: ACTIVE | Noted: 2017-08-02

## 2022-01-26 PROBLEM — L82.1 SEBORRHEIC KERATOSIS: Status: ACTIVE | Noted: 2019-12-20

## 2022-01-26 PROBLEM — R00.2 INTERMITTENT PALPITATIONS: Status: ACTIVE | Noted: 2017-08-02

## 2022-01-26 PROBLEM — B07.9 WART: Status: ACTIVE | Noted: 2018-06-29

## 2022-01-26 PROBLEM — L84 HEEL CALLUS: Status: ACTIVE | Noted: 2017-09-07

## 2022-01-26 PROBLEM — B02.29 POSTHERPETIC NEURALGIA: Status: ACTIVE | Noted: 2018-07-26

## 2022-01-31 PROBLEM — Z08 ENCOUNTER FOR FOLLOW-UP SURVEILLANCE OF UTERINE CANCER: Status: ACTIVE | Noted: 2021-06-29

## 2022-02-01 ENCOUNTER — APPOINTMENT (OUTPATIENT)
Dept: GYNECOLOGIC ONCOLOGY | Facility: CLINIC | Age: 62
End: 2022-02-01
Payer: COMMERCIAL

## 2022-02-01 VITALS
HEART RATE: 84 BPM | HEIGHT: 67 IN | BODY MASS INDEX: 21.19 KG/M2 | DIASTOLIC BLOOD PRESSURE: 88 MMHG | WEIGHT: 135 LBS | SYSTOLIC BLOOD PRESSURE: 125 MMHG

## 2022-02-01 DIAGNOSIS — N93.9 ABNORMAL UTERINE AND VAGINAL BLEEDING, UNSPECIFIED: ICD-10-CM

## 2022-02-01 DIAGNOSIS — Z85.42 ENCOUNTER FOR FOLLOW-UP EXAMINATION AFTER COMPLETED TREATMENT FOR MALIGNANT NEOPLASM: ICD-10-CM

## 2022-02-01 DIAGNOSIS — R30.0 DYSURIA: ICD-10-CM

## 2022-02-01 DIAGNOSIS — Z23 ENCOUNTER FOR IMMUNIZATION: ICD-10-CM

## 2022-02-01 DIAGNOSIS — C55 MALIGNANT NEOPLASM OF UTERUS, PART UNSPECIFIED: ICD-10-CM

## 2022-02-01 DIAGNOSIS — N95.1 MENOPAUSAL AND FEMALE CLIMACTERIC STATES: ICD-10-CM

## 2022-02-01 DIAGNOSIS — M48.02 SPINAL STENOSIS, CERVICAL REGION: ICD-10-CM

## 2022-02-01 DIAGNOSIS — L84 CORNS AND CALLOSITIES: ICD-10-CM

## 2022-02-01 DIAGNOSIS — N89.8 OTHER SPECIFIED NONINFLAMMATORY DISORDERS OF VAGINA: ICD-10-CM

## 2022-02-01 DIAGNOSIS — Z08 ENCOUNTER FOR FOLLOW-UP EXAMINATION AFTER COMPLETED TREATMENT FOR MALIGNANT NEOPLASM: ICD-10-CM

## 2022-02-01 DIAGNOSIS — M25.551 PAIN IN RIGHT HIP: ICD-10-CM

## 2022-02-01 DIAGNOSIS — M79.2 NEURALGIA AND NEURITIS, UNSPECIFIED: ICD-10-CM

## 2022-02-01 DIAGNOSIS — B07.9 VIRAL WART, UNSPECIFIED: ICD-10-CM

## 2022-02-01 DIAGNOSIS — F51.02 ADJUSTMENT INSOMNIA: ICD-10-CM

## 2022-02-01 DIAGNOSIS — L82.1 OTHER SEBORRHEIC KERATOSIS: ICD-10-CM

## 2022-02-01 DIAGNOSIS — R00.2 PALPITATIONS: ICD-10-CM

## 2022-02-01 DIAGNOSIS — R06.00 DYSPNEA, UNSPECIFIED: ICD-10-CM

## 2022-02-01 DIAGNOSIS — H53.419 SCOTOMA INVOLVING CENTRAL AREA, UNSPECIFIED EYE: ICD-10-CM

## 2022-02-01 DIAGNOSIS — B02.29 OTHER POSTHERPETIC NERVOUS SYSTEM INVOLVEMENT: ICD-10-CM

## 2022-02-01 DIAGNOSIS — M54.31 SCIATICA, RIGHT SIDE: ICD-10-CM

## 2022-02-01 DIAGNOSIS — G89.29 PAIN IN RIGHT HIP: ICD-10-CM

## 2022-02-01 PROCEDURE — 99213 OFFICE O/P EST LOW 20 MIN: CPT

## 2022-02-01 RX ORDER — GABAPENTIN 300 MG/1
300 CAPSULE ORAL
Qty: 90 | Refills: 1 | Status: DISCONTINUED | COMMUNITY
Start: 2017-05-15 | End: 2022-02-01

## 2022-02-01 RX ORDER — CELECOXIB 200 MG/1
200 CAPSULE ORAL
Qty: 30 | Refills: 3 | Status: DISCONTINUED | COMMUNITY
Start: 2022-01-12 | End: 2022-02-01

## 2022-03-02 ENCOUNTER — APPOINTMENT (OUTPATIENT)
Dept: FAMILY MEDICINE | Facility: CLINIC | Age: 62
End: 2022-03-02
Payer: COMMERCIAL

## 2022-03-02 VITALS
OXYGEN SATURATION: 98 % | SYSTOLIC BLOOD PRESSURE: 110 MMHG | WEIGHT: 140 LBS | DIASTOLIC BLOOD PRESSURE: 80 MMHG | TEMPERATURE: 97.9 F | HEART RATE: 83 BPM | BODY MASS INDEX: 21.97 KG/M2 | HEIGHT: 67 IN

## 2022-03-02 DIAGNOSIS — Z01.818 ENCOUNTER FOR OTHER PREPROCEDURAL EXAMINATION: ICD-10-CM

## 2022-03-02 DIAGNOSIS — Z87.19 PERSONAL HISTORY OF OTHER DISEASES OF THE DIGESTIVE SYSTEM: ICD-10-CM

## 2022-03-02 DIAGNOSIS — M10.241: ICD-10-CM

## 2022-03-02 DIAGNOSIS — M79.672 PAIN IN LEFT FOOT: ICD-10-CM

## 2022-03-02 DIAGNOSIS — L30.4 ERYTHEMA INTERTRIGO: ICD-10-CM

## 2022-03-02 DIAGNOSIS — Z87.898 PERSONAL HISTORY OF OTHER SPECIFIED CONDITIONS: ICD-10-CM

## 2022-03-02 DIAGNOSIS — R79.89 OTHER SPECIFIED ABNORMAL FINDINGS OF BLOOD CHEMISTRY: ICD-10-CM

## 2022-03-02 DIAGNOSIS — G62.9 POLYNEUROPATHY, UNSPECIFIED: ICD-10-CM

## 2022-03-02 DIAGNOSIS — R21 RASH AND OTHER NONSPECIFIC SKIN ERUPTION: ICD-10-CM

## 2022-03-02 DIAGNOSIS — M79.671 PAIN IN RIGHT FOOT: ICD-10-CM

## 2022-03-02 DIAGNOSIS — D72.9 DISORDER OF WHITE BLOOD CELLS, UNSPECIFIED: ICD-10-CM

## 2022-03-02 DIAGNOSIS — H26.9 UNSPECIFIED CATARACT: ICD-10-CM

## 2022-03-02 DIAGNOSIS — B02.22 POSTHERPETIC TRIGEMINAL NEURALGIA: ICD-10-CM

## 2022-03-02 DIAGNOSIS — N13.30 UNSPECIFIED HYDRONEPHROSIS: ICD-10-CM

## 2022-03-02 DIAGNOSIS — Z87.448 PERSONAL HISTORY OF OTHER DISEASES OF URINARY SYSTEM: ICD-10-CM

## 2022-03-02 DIAGNOSIS — R10.9 UNSPECIFIED ABDOMINAL PAIN: ICD-10-CM

## 2022-03-02 DIAGNOSIS — Z86.2 PERSONAL HISTORY OF DISEASES OF THE BLOOD AND BLOOD-FORMING ORGANS AND CERTAIN DISORDERS INVOLVING THE IMMUNE MECHANISM: ICD-10-CM

## 2022-03-02 DIAGNOSIS — K64.5 PERIANAL VENOUS THROMBOSIS: ICD-10-CM

## 2022-03-02 DIAGNOSIS — G89.29 UNSPECIFIED ABDOMINAL PAIN: ICD-10-CM

## 2022-03-02 PROCEDURE — 99214 OFFICE O/P EST MOD 30 MIN: CPT

## 2022-03-02 NOTE — ASSESSMENT
[Patient Optimized for Surgery] : Patient optimized for surgery [No Further Testing Recommended] : no further testing recommended [FreeTextEntry4] : Patient is cleared for lens replacement in both eyes

## 2022-03-02 NOTE — HISTORY OF PRESENT ILLNESS
[No Pertinent Cardiac History] : no history of aortic stenosis, atrial fibrillation, coronary artery disease, recent myocardial infarction, or implantable device/pacemaker [No Pertinent Pulmonary History] : no history of asthma, COPD, sleep apnea, or smoking [No Adverse Anesthesia Reaction] : no adverse anesthesia reaction in self or family member [Chronic Anticoagulation] : no chronic anticoagulation [Chronic Kidney Disease] : no chronic kidney disease [Diabetes] : no diabetes [FreeTextEntry1] : Bilateral intraocular lens replacement [FreeTextEntry2] : Right arm March 4 left eye March 15 [FreeTextEntry3] : Dr. Simmons [FreeTextEntry4] : Patient has poor vision to have bilateral lens replacement\par \par She has had multiple surgeries for endometrial cancer all of which were uneventful

## 2022-03-15 ENCOUNTER — RESULT REVIEW (OUTPATIENT)
Age: 62
End: 2022-03-15

## 2022-03-22 ENCOUNTER — APPOINTMENT (OUTPATIENT)
Dept: CT IMAGING | Facility: CLINIC | Age: 62
End: 2022-03-22
Payer: COMMERCIAL

## 2022-03-22 ENCOUNTER — APPOINTMENT (OUTPATIENT)
Dept: NUCLEAR MEDICINE | Facility: CLINIC | Age: 62
End: 2022-03-22
Payer: COMMERCIAL

## 2022-03-22 ENCOUNTER — OUTPATIENT (OUTPATIENT)
Dept: OUTPATIENT SERVICES | Facility: HOSPITAL | Age: 62
LOS: 1 days | End: 2022-03-22
Payer: COMMERCIAL

## 2022-03-22 DIAGNOSIS — C54.1 MALIGNANT NEOPLASM OF ENDOMETRIUM: ICD-10-CM

## 2022-03-22 DIAGNOSIS — Z00.8 ENCOUNTER FOR OTHER GENERAL EXAMINATION: ICD-10-CM

## 2022-03-22 DIAGNOSIS — N90.7 VULVAR CYST: Chronic | ICD-10-CM

## 2022-03-22 DIAGNOSIS — Z98.890 OTHER SPECIFIED POSTPROCEDURAL STATES: Chronic | ICD-10-CM

## 2022-03-22 PROCEDURE — 71260 CT THORAX DX C+: CPT

## 2022-03-22 PROCEDURE — 82565 ASSAY OF CREATININE: CPT

## 2022-03-22 PROCEDURE — 71260 CT THORAX DX C+: CPT | Mod: 26

## 2022-03-22 PROCEDURE — 74177 CT ABD & PELVIS W/CONTRAST: CPT

## 2022-03-22 PROCEDURE — 74177 CT ABD & PELVIS W/CONTRAST: CPT | Mod: 26

## 2022-03-24 LAB
25(OH)D3 SERPL-MCNC: 24.1 NG/ML
ALBUMIN SERPL ELPH-MCNC: 4.5 G/DL
ALP BLD-CCNC: 97 U/L
ALT SERPL-CCNC: 43 U/L
ANION GAP SERPL CALC-SCNC: 10 MMOL/L
AST SERPL-CCNC: 32 U/L
BASOPHILS # BLD AUTO: 0.05 K/UL
BASOPHILS NFR BLD AUTO: 0.9 %
BILIRUB SERPL-MCNC: 0.5 MG/DL
BUN SERPL-MCNC: 19 MG/DL
CALCIUM SERPL-MCNC: 9.6 MG/DL
CANCER AG125 SERPL-ACNC: 7 U/ML
CHLORIDE SERPL-SCNC: 106 MMOL/L
CO2 SERPL-SCNC: 26 MMOL/L
CREAT SERPL-MCNC: 0.7 MG/DL
EGFR: 99 ML/MIN/1.73M2
EOSINOPHIL # BLD AUTO: 0.12 K/UL
EOSINOPHIL NFR BLD AUTO: 2.2 %
GLUCOSE SERPL-MCNC: 87 MG/DL
HCT VFR BLD CALC: 43.7 %
HGB BLD-MCNC: 13.9 G/DL
IMM GRANULOCYTES NFR BLD AUTO: 0.2 %
LYMPHOCYTES # BLD AUTO: 0.85 K/UL
LYMPHOCYTES NFR BLD AUTO: 15.9 %
MAGNESIUM SERPL-MCNC: 2.1 MG/DL
MAN DIFF?: NORMAL
MCHC RBC-ENTMCNC: 30.3 PG
MCHC RBC-ENTMCNC: 31.8 GM/DL
MCV RBC AUTO: 95.4 FL
MONOCYTES # BLD AUTO: 0.53 K/UL
MONOCYTES NFR BLD AUTO: 9.9 %
NEUTROPHILS # BLD AUTO: 3.78 K/UL
NEUTROPHILS NFR BLD AUTO: 70.9 %
PLATELET # BLD AUTO: 304 K/UL
POTASSIUM SERPL-SCNC: 5.5 MMOL/L
PROT SERPL-MCNC: 6.4 G/DL
RBC # BLD: 4.58 M/UL
RBC # FLD: 12.6 %
SODIUM SERPL-SCNC: 143 MMOL/L
WBC # FLD AUTO: 5.34 K/UL

## 2022-03-25 ENCOUNTER — OUTPATIENT (OUTPATIENT)
Dept: OUTPATIENT SERVICES | Facility: HOSPITAL | Age: 62
LOS: 1 days | Discharge: HOME | End: 2022-03-25

## 2022-03-25 DIAGNOSIS — Z98.890 OTHER SPECIFIED POSTPROCEDURAL STATES: Chronic | ICD-10-CM

## 2022-03-25 DIAGNOSIS — N90.7 VULVAR CYST: Chronic | ICD-10-CM

## 2022-03-25 DIAGNOSIS — N93.9 ABNORMAL UTERINE AND VAGINAL BLEEDING, UNSPECIFIED: ICD-10-CM

## 2022-03-28 ENCOUNTER — APPOINTMENT (OUTPATIENT)
Dept: HEMATOLOGY ONCOLOGY | Facility: CLINIC | Age: 62
End: 2022-03-28
Payer: COMMERCIAL

## 2022-03-28 VITALS
WEIGHT: 144 LBS | TEMPERATURE: 98.3 F | OXYGEN SATURATION: 98 % | SYSTOLIC BLOOD PRESSURE: 124 MMHG | HEART RATE: 80 BPM | BODY MASS INDEX: 22.55 KG/M2 | DIASTOLIC BLOOD PRESSURE: 80 MMHG | RESPIRATION RATE: 17 BRPM

## 2022-03-28 DIAGNOSIS — N20.0 CALCULUS OF KIDNEY: ICD-10-CM

## 2022-03-28 DIAGNOSIS — F41.9 ANXIETY DISORDER, UNSPECIFIED: ICD-10-CM

## 2022-03-28 DIAGNOSIS — C54.1 MALIGNANT NEOPLASM OF ENDOMETRIUM: ICD-10-CM

## 2022-03-28 PROCEDURE — 99215 OFFICE O/P EST HI 40 MIN: CPT

## 2022-03-28 NOTE — ASSESSMENT
[FreeTextEntry1] : Most relapses that occur among patients without prior radiation therapy are vaginal recurrences. Data regarding treatment of isolated vaginal relapse are limited, but outcomes appear favorable based on observational studies with RT and the few studies of surgical treatment for this patient population. In the PORTEC-1 trial, among 30 women without prior RT who experienced an isolated vaginal recurrence, the complete response rate to curative-intent treatment was 87 percent.  For most women (28 patients), curative-intent treatment consisted of whole-pelvic RT, with or without brachytherapy, although one of these patients also received surgery, and two were also treated with endocrine therapy.  \par \par For patients with extravaginal extension or pelvic lymph node involvement, the prognosis is worse. In PORTEC-1, only 4 of 10 patients who were treated for pelvic relapse with RT with curative intent reached complete remission.\par There is an ongoing [facility] trial 238, which randomly assigns these patients to RT with or without concurrent sensitizing cisplatin but is still considered investigational.  \par \par For patients treated with curative intent for locoregional recurrence, surveillance is necessary after treatment. Per the NCCN guidelines, CT CAP every six months for the first three years and then every 6 to 12 months for the following two years is recommended.

## 2022-03-28 NOTE — HISTORY OF PRESENT ILLNESS
[Disease: _____________________] : Disease: [unfilled] [Home] : at home, [unfilled] , at the time of the visit. [Medical Office: ([facility])___] : at the medical office located in  [Verbal consent obtained from patient] : the patient, [unfilled] [de-identified] : The patient was diagnosed with endometrial cancer in June 2020 at the age of 60.  She presented to GYN with postmenopausal bleeding in 12/2019. She had not seen a gynecologist in 27 years and had significant history of severe vulvodynia and resultant refusal of pelvic exams. She noted intermittent postmenopausal spotting for greater than 2 years. Occurred once in 2018, once in 2019 then returned in Jan 2020 almost every day.  2/25/20 Pelvic US:  uterus- left fundal leiomyoma measuring 3.9 x 2.5 x 4.4 cm, and another right-sided leiomyoma measures 2.3 x 2 x 2.7 cm.  3/5/2020- Pelvic MRI:   uterus-  several leiomyomata including 2 pedunculated left- sided leiomyoma measuring 3.2 x and 2.5 cm,  as well as a fundal subserosal leiomyoma measuring 2.8 cm, with an apparent pedunculated 2.5 cm.  No pelvic lymphadenopathy.  D&C 6/12/20:  grade 2 endometrial cancer with clear cell features. A robotic TLH/BSO on 6/22/20 revealed a 3.5 cm endometrioid adenocarcinoma without myometrial invasion and 4 negative pelvic lymph nodes. The pelvic washings were negative for malignant cells. MMR: loss of nuclear expression of MLH1/PMS2; methylation detected, indicative of sporadic mutation. No adjuvant therapy was indicated.  She had a postop checkup due to complaints of vaginal spotting on 12/8/2020. A 3 cm vaginal cuff lesion was noted and biopsy was performed and a friable mass was seen. Pathology showed a clear cell carcinoma.  She was deemed not a surgical candidate and is s/p EBRT with Dr. Katherine Dickerson with plans for brachytherapy as well.    [de-identified] : PMH:  She has significant history of anxiety and severe vulvodynia and resultant refusal of pelvic exams as well as sexual dysfunction.\par PSH: lumpectomy, multiple vulvar surgeries due to vulvodynia (Bartholin gland, hymenal tissue/scar tissue removed) under care of Dr. Jefferson Downs at [place]. \par FH:: father- mesothelioma, brother- bladder cancer, maternal grandmother with breast and colon cancer, maternal great aunt with breast, maternal second cousin with breast cancer.\par HM:  Mammo/ SBE/ CBE- followed by Dr. Briscoe (diffuse cystic mastopathy) - [facility] 9/2020 BIRADS2\par EGD- 10/2016- hiatal hernia\par Colonoscopy/EGD- 10/2016- negative per patient; had f/u 8/2020 which had a benign polyp per patient \par \par  [de-identified] : She has completed EBRT in the last week of February 2021 and tolerated it well, except for vulvar irritation. She had a PET 6/4/2021 which showed minimal soft tissue thickening in the region of the left vaginal cuff which is non-FDG avid, unchanged since March 23, 2021 and considerably decreased as compared to CT December 24, 2020.  No evidence of FDG avid distant metastatic disease.\par \par She has occasional spotting that stopped ~ 2-3 weeks and resumed mid March, with occasional spotting. She denies any other associated signs and symptoms. She reports chronic right hip pain.  She continues to have spotting and vaginal bleeding on a daily basis, intermittent vulvodynia.

## 2022-03-28 NOTE — RESULTS/DATA
[FreeTextEntry1] : 3/22/22 CT C/A/P: Stable soft tissue prominence of the left vaginal cuff. No measurable disease. No evidence of metastatic disease in the chest.\par \par 12/22/21 CT C/A/P: Stable appearance of the chest compared with 3/24/2021. Interval significant decrease in size of previously noted mass at the left vaginal apex since 12/24/2020. Minimal soft tissue prominence is noted in this region as described above. Interval resolution of previously noted right hydroureteronephrosis since PET/CT dated 9/22/2021. Previously noted distal right ureteral calculus is no longer seen.\par \par 9/22/21 PET: 1.  New moderate right hydronephrosis and right hydroureter to the level of the UVJ, probably secondary to an obstructive right UVJ/distal ureteral calculus. Urologic consultation is suggested. No definite FDG avid soft tissue region of the right vaginal cuff or right pelvis to explain the new right hydronephrosis/right hydroureter.\par 2.  Postsurgical changes of hysterectomy and bilateral salpingo-oophorectomy. Fiducial markers in the left vaginal cuff, with unchanged, minimal non-FDG avid soft tissue prominence.\par 3.  No scan evidence of FDG avid locoregional or distant metastatic disease.\par \par 6/22/2021 PET/CT - Minimal soft tissue thickening in the region of the left vaginal cuff which is non-FDG avid, unchanged since March 23, 2021 and considerably decreased as compared to CT December 24, 2020.  No evidence of FDG avid distant metastatic disease.\par \par 3/24/21 CT Chest:  SHAINA\par \par 3/23/21 MRI Pelvis:  Status post hysterectomy previously demonstrated patchy cuff lesion today measures 1.5 x 1.9 x 1.3 cm in size with peripheral enhancement and possible central necrosis. This represents a decrease in size from prior CT scan where it measured 3.0 x 3.1 cm\par \par 12/24/20 CT C/A/P:  3.1 cm solid mass adjacent to the left vaginal apex suspicious for recurrent disease.  No evidence of metastatic disease in the thorax or abdomen.\par \par 12/22/20 path:  Vaginal cuff mass, biopsy:\par  - Clear cell carcinoma.  The tumor cells are focally positive for Napsin, and ER, and negative for MI, supporting the above diagnosis\par \par 6/10/20 Pathology:\par Uterus, cervix, bilateral fallopian tubes and ovaries, total hysterectomy and bilateral salpingo-oophorectomy:  Endometrial endometrioid carcinoma (FIGO I).  Tumor is extending into the adenomyosis foci.  Leiomyomas.  Adenomyosis. Unremarkable cervix.  Unremarkable bilateral ovaries and fallopian tubes\par Lymph node, pelvic sentinel, right, excision: Two lymph nodes negative for tumor (0/2) ( H and E and immunostain AE1-AE3)\par Uterus, lower uterine segment fibroid, removal:  Leiomyoma with degenerative changes\par Fallopian tube,  paratubal cyst, right, removal:  Unremarkable fallopian tube with microcalcifications. Paratubal cysts\par Lymph node, pelvic sentinel, left, excision:  Four  lymph nodes negative for tumor (0/7)( H and E and immunostain AE1-AE3)\par Comment :  Final grading of the tumor is grade II in correlation with patient prior biopsy. Of note, no myometrial invasion is identified, what was seen in the original frozen section slide is best interpreted as involvement / extension of the tumor into the foci of adenomyosis rather than myometrial invasion.\par Synoptic Summary\par Procedure:  Total hysterectomy and bilateral salpingo-oophorectomy\par Specimen Integrity:   Intact hysterectomy\par Tumor Site:   Anterior endometrium and Posterior endometrium\par Tumor Size: 3.5  cm ( greatest dimension)\par Histologic Type:  Endometrioid adenocarcinoma\par Histologic Grade:   FIGO grade II\par Myometrial Invasion:   Not present\par Adenomyosis: present - involved by carcinoma\par Uterine Serosa Involvement:   Not identified\par Lower Uterine Segment Involvement:  Not identified\par Cervical Stromal Involvement:   Not involved\par Other Tissue/Organs involvement:   Not involved\par Peritoneal or Ascitic Fluid:  Please see result ([id number])\par Margins:    Not applicable\par Lymphovascular Invasion:   Not identified\par Regional Lymph Nodes:\par Pelvic Lymph nodes:\par Number of Pelvic Nodes with Macrometastasis (>2mm):  0\par Number of Pelvic Nodes with Micrometastasis (>0.2 mm up to 2mm): 0\par Number of Pelvic Nodes with Isolated Tumor Cells (0.2 mm or less): 0\par Laterality of Pelvic Nodes with Tumor: N/A\par Total Number of Pelvic Nodes Examined (sentinel and non-sentinel): 6\par Number of Pelvic [place] Nodes Examined: 6\par Laterality of Pelvic Nodes Examined: Left and right\par Para-aortic Lymph nodes:  No lymph nodes submitted\par Pathologic Stage Classification (pTNM, AJCC 8th Edition)\par TNM descriptors : N/A\par Primary Tumor (pT):  pT1a\par Regional Lymph Nodes (pN) or pN (sn):   pN0\par Distant Metastasis (pM):  pM0\par FIGO Stage (2015 FIGO Cancer Report):  Ia\par Additional Pathologic Findings:    None\par Clinical History:   None\par Ancillary Studies:  (MMR IHC)\par Immunohistochemistry Testing (IHC) for Mismatch Repair Proteins performed on tissue block 1H shows the following result:\par MLH1 : Loss of nuclear expression\par MSH2 :  Intact nuclear expression\par MSH6:  Intact nuclear expression\par PMS2 : Loss of nuclear expression\par Interpretation: There is a loss of nuclear expression in MLH1 and PMS2, while MSH2 and MSH6 show intact nuclear expression. Loss of nuclear expression of MLH1 and PMS2 is supportive of DNA mismatch repair deficiency and high frequency of microsatellite instability (MSI-H).\par \par 6/10/20 Pathology:  Endocervix curettings - Fragments of endometrial adenocarcinoma morphologically similar to part 2.  Scant benign cervical tissue \par Endometrial curettings - Endometrial endometrioid adenocarcinoma with focal squamous and mucinous differentiation, FIGO grade 2 (see comment)\par Comment: Focal areas of tumor with clear cell morphology are seen admixed with fragments of endometrioid adenocarcinoma.

## 2022-03-29 ENCOUNTER — NON-APPOINTMENT (OUTPATIENT)
Age: 62
End: 2022-03-29

## 2022-04-12 ENCOUNTER — APPOINTMENT (OUTPATIENT)
Dept: SURGERY | Facility: CLINIC | Age: 62
End: 2022-04-12

## 2022-04-12 ENCOUNTER — APPOINTMENT (OUTPATIENT)
Dept: BREAST CENTER | Facility: CLINIC | Age: 62
End: 2022-04-12
Payer: COMMERCIAL

## 2022-04-12 VITALS
WEIGHT: 143 LBS | SYSTOLIC BLOOD PRESSURE: 133 MMHG | BODY MASS INDEX: 22.44 KG/M2 | TEMPERATURE: 97.2 F | OXYGEN SATURATION: 96 % | HEIGHT: 67 IN | HEART RATE: 66 BPM | DIASTOLIC BLOOD PRESSURE: 78 MMHG

## 2022-04-12 DIAGNOSIS — N60.19 DIFFUSE CYSTIC MASTOPATHY OF UNSPECIFIED BREAST: ICD-10-CM

## 2022-04-12 PROCEDURE — 99214 OFFICE O/P EST MOD 30 MIN: CPT

## 2022-04-12 NOTE — HISTORY OF PRESENT ILLNESS
[FreeTextEntry1] : I had the pleasure of seeing STARLA HADDAD  in the office today for a follow up visit.\par \par She reports bilateral breast pain ( L>R).  She saw Dr. Jarrell (cardiologist) and underwent echocardiogram and states the test came back within normal limits.\par \par She denies dominant breast mass, skin changes or nipple discharge. She denies headaches, blurry vision, chest pain, SOB, abdominal pain, joint aches or difficult walking.\par \par G0.  Menses began at age 15.\par She denies personal history of malignancy.\par Family history is significant for maternal great aunt diagnosed with breast cancer at age 40, maternal cousin diagnosed with breast cancer at age 29, maternal cousin diagnosed with cancer at age 35 and maternal grandmother diagnosed with breast cancer at age 80.  Maternal grandmother diagnosed with colon cancer at age 88.\par \par Imaging:  [facility]  Radiology\par 5/16/2019  MAMMO SCREENING ROGERIO BILAT\par Impression:  No mammographic evidence of malignancy.  Recommend routine screening.  BIRADS 2 benign\par \par 5/16/2019  BREAST ULTRASOUND BILATERAL COMPLETE\par Impression:  Probable benign findings bilaterally, right breast 8:00, left 5:00 and 5:30, as well as 11:00, for which short term interval follow up bilateral ultrasound is recommended in 6 months time.  BIRADS 3 Probably benign.\par \par 12/17/2019  Breast ultrasound bilateral complete\par Impression:  Stable probable benign left breast nodules.  Left beast cysts.  Right breast cysts.  Otherwise negative bilateral breast ultrasound.  Recommendation:  Six month follow up bilateral beast ultrasound and mammogram are recommended to correspond to the patients routine annual exam.  BIRADS 3 Probably benign findings.\par \par 11/12/2021  Mammo screening rogerio BILAT\par Impression:  No suspicious mammographic abnormality noted.  BIRADS 2 benign\par \par 11/12/2021  [place] ultrasound bilateral complete\par Impression:  Bilateral stable cystic nodules.  BIRADS 2 benign findings\par \par We reviewed clinical exam and recent imaging.  Both clinical exam and recent imaging is benign today.   Recommendation for follow up after screening mammogram and sonogram.\par She understands and agrees to plan.

## 2022-04-12 NOTE — PHYSICAL EXAM
[Normocephalic] : normocephalic [Atraumatic] : atraumatic [EOMI] : extra ocular movement intact [PERRL] : pupils equal, round and reactive to light [Sclera nonicteric] : sclera nonicteric [Supple] : supple [No Supraclavicular Adenopathy] : no supraclavicular adenopathy [Examined in the supine and seated position] : examined in the supine and seated position [No dominant masses] : no dominant masses in right breast  [No dominant masses] : no dominant masses left breast [No Nipple Retraction] : no left nipple retraction [No Nipple Discharge] : no left nipple discharge [Breast Nipple Inversion] : nipples not inverted [Breast Nipple Retraction] : nipples not retracted [Breast Nipple Flattening] : nipples not flattened [Breast Nipple Fissures] : nipples not fissured [Breast Abnormal Lactation (Galactorrhea)] : no galactorrhea [Breast Abnormal Secretion Bloody Fluid] : no bloody discharge [Breast Abnormal Secretion Serous Fluid] : no serous discharge [Breast Abnormal Secretion Opalescent Fluid] : no milky discharge [No Axillary Lymphadenopathy] : no left axillary lymphadenopathy [No Edema] : no edema [No Rashes] : no rashes [No Ulceration] : no ulceration [de-identified] : No supraclavicular or axillary adenopathy. No dominant masses, normal to palpation. Everted nipple without discharge. No skin changes.\par \par  [de-identified] : No supraclavicular or axillary adenopathy. No dominant masses, normal to palpation. Everted nipple without discharge. No skin changes.\par \par

## 2022-04-12 NOTE — ASSESSMENT
[FreeTextEntry1] : 60 yo female with fibrocystic breast disease presents followup exam.  Both clinical exam and recent imaging is benign today.  Recommendation for\par 1.  Screening mammogram/sonogram 11/2022\par 2.  Followup in 6 months after imaging

## 2022-05-17 ENCOUNTER — APPOINTMENT (OUTPATIENT)
Dept: GYNECOLOGIC ONCOLOGY | Facility: CLINIC | Age: 62
End: 2022-05-17
Payer: COMMERCIAL

## 2022-05-17 VITALS
BODY MASS INDEX: 22.44 KG/M2 | WEIGHT: 143 LBS | HEART RATE: 89 BPM | HEIGHT: 67 IN | DIASTOLIC BLOOD PRESSURE: 96 MMHG | SYSTOLIC BLOOD PRESSURE: 152 MMHG

## 2022-05-17 PROCEDURE — 99213 OFFICE O/P EST LOW 20 MIN: CPT

## 2022-06-03 ENCOUNTER — OUTPATIENT (OUTPATIENT)
Dept: OUTPATIENT SERVICES | Facility: HOSPITAL | Age: 62
LOS: 1 days | End: 2022-06-03
Payer: COMMERCIAL

## 2022-06-03 ENCOUNTER — APPOINTMENT (OUTPATIENT)
Dept: RADIOLOGY | Facility: CLINIC | Age: 62
End: 2022-06-03
Payer: COMMERCIAL

## 2022-06-03 DIAGNOSIS — N90.7 VULVAR CYST: Chronic | ICD-10-CM

## 2022-06-03 DIAGNOSIS — Z00.8 ENCOUNTER FOR OTHER GENERAL EXAMINATION: ICD-10-CM

## 2022-06-03 DIAGNOSIS — Z98.890 OTHER SPECIFIED POSTPROCEDURAL STATES: Chronic | ICD-10-CM

## 2022-06-03 PROCEDURE — 77080 DXA BONE DENSITY AXIAL: CPT

## 2022-06-03 PROCEDURE — 77080 DXA BONE DENSITY AXIAL: CPT | Mod: 26

## 2022-06-08 DIAGNOSIS — E04.9 NONTOXIC GOITER, UNSPECIFIED: ICD-10-CM

## 2022-06-11 ENCOUNTER — OUTPATIENT (OUTPATIENT)
Dept: OUTPATIENT SERVICES | Facility: HOSPITAL | Age: 62
LOS: 1 days | End: 2022-06-11
Payer: COMMERCIAL

## 2022-06-11 ENCOUNTER — APPOINTMENT (OUTPATIENT)
Dept: ULTRASOUND IMAGING | Facility: CLINIC | Age: 62
End: 2022-06-11
Payer: COMMERCIAL

## 2022-06-11 DIAGNOSIS — Z98.890 OTHER SPECIFIED POSTPROCEDURAL STATES: Chronic | ICD-10-CM

## 2022-06-11 DIAGNOSIS — N90.7 VULVAR CYST: Chronic | ICD-10-CM

## 2022-06-11 DIAGNOSIS — E04.9 NONTOXIC GOITER, UNSPECIFIED: ICD-10-CM

## 2022-06-11 DIAGNOSIS — Z00.8 ENCOUNTER FOR OTHER GENERAL EXAMINATION: ICD-10-CM

## 2022-06-11 PROCEDURE — 76536 US EXAM OF HEAD AND NECK: CPT | Mod: 26

## 2022-06-11 PROCEDURE — 76536 US EXAM OF HEAD AND NECK: CPT

## 2022-06-27 ENCOUNTER — RESULT REVIEW (OUTPATIENT)
Age: 62
End: 2022-06-27

## 2022-07-05 NOTE — ASU DISCHARGE PLAN (ADULT/PEDIATRIC) - CALL YOUR DOCTOR IF YOU HAVE ANY OF THE FOLLOWING:
Nausea and vomiting that does not stop/Bleeding that does not stop/Wound/Surgical Site with redness, or foul smelling discharge or pus/Unable to urinate/Excessive diarrhea/Increased irritability or sluggishness/Fever greater than (need to indicate Fahrenheit or Celsius)/Numbness, tingling, color or temperature change to extremity/Inability to tolerate liquids or foods/Swelling that gets worse/Pain not relieved by Medications Bcc Histology Text: There were numerous aggregates of basaloid cells.

## 2022-07-22 ENCOUNTER — OUTPATIENT (OUTPATIENT)
Dept: OUTPATIENT SERVICES | Facility: HOSPITAL | Age: 62
LOS: 1 days | End: 2022-07-22
Payer: COMMERCIAL

## 2022-07-22 ENCOUNTER — APPOINTMENT (OUTPATIENT)
Dept: CT IMAGING | Facility: CLINIC | Age: 62
End: 2022-07-22

## 2022-07-22 DIAGNOSIS — Z98.890 OTHER SPECIFIED POSTPROCEDURAL STATES: Chronic | ICD-10-CM

## 2022-07-22 DIAGNOSIS — Z00.8 ENCOUNTER FOR OTHER GENERAL EXAMINATION: ICD-10-CM

## 2022-07-22 DIAGNOSIS — N90.7 VULVAR CYST: Chronic | ICD-10-CM

## 2022-07-22 DIAGNOSIS — C54.1 MALIGNANT NEOPLASM OF ENDOMETRIUM: ICD-10-CM

## 2022-07-22 PROCEDURE — 74177 CT ABD & PELVIS W/CONTRAST: CPT

## 2022-07-22 PROCEDURE — 74177 CT ABD & PELVIS W/CONTRAST: CPT | Mod: 26

## 2022-07-24 ENCOUNTER — OUTPATIENT (OUTPATIENT)
Dept: OUTPATIENT SERVICES | Facility: HOSPITAL | Age: 62
LOS: 1 days | Discharge: HOME | End: 2022-07-24

## 2022-07-24 DIAGNOSIS — Z98.890 OTHER SPECIFIED POSTPROCEDURAL STATES: Chronic | ICD-10-CM

## 2022-07-24 DIAGNOSIS — N93.9 ABNORMAL UTERINE AND VAGINAL BLEEDING, UNSPECIFIED: ICD-10-CM

## 2022-07-24 DIAGNOSIS — N90.7 VULVAR CYST: Chronic | ICD-10-CM

## 2022-07-26 ENCOUNTER — LABORATORY RESULT (OUTPATIENT)
Age: 62
End: 2022-07-26

## 2022-07-26 LAB
ALBUMIN SERPL ELPH-MCNC: 4.1 G/DL
ALP BLD-CCNC: 79 U/L
ALT SERPL-CCNC: 23 U/L
ANION GAP SERPL CALC-SCNC: 12 MMOL/L
AST SERPL-CCNC: 24 U/L
BASOPHILS # BLD AUTO: 0.05 K/UL
BASOPHILS NFR BLD AUTO: 1 %
BILIRUB SERPL-MCNC: 0.2 MG/DL
BUN SERPL-MCNC: 21 MG/DL
CALCIUM SERPL-MCNC: 8.9 MG/DL
CANCER AG125 SERPL-ACNC: 6 U/ML
CHLORIDE SERPL-SCNC: 108 MMOL/L
CO2 SERPL-SCNC: 22 MMOL/L
CREAT SERPL-MCNC: 0.68 MG/DL
EGFR: 99 ML/MIN/1.73M2
EOSINOPHIL # BLD AUTO: 0.14 K/UL
EOSINOPHIL NFR BLD AUTO: 2.8 %
GLUCOSE SERPL-MCNC: 95 MG/DL
HCT VFR BLD CALC: 41.4 %
HGB BLD-MCNC: 13.6 G/DL
IMM GRANULOCYTES NFR BLD AUTO: 0.2 %
LYMPHOCYTES # BLD AUTO: 1.02 K/UL
LYMPHOCYTES NFR BLD AUTO: 20.7 %
MAGNESIUM SERPL-MCNC: 2.2 MG/DL
MAN DIFF?: NORMAL
MCHC RBC-ENTMCNC: 30.8 PG
MCHC RBC-ENTMCNC: 32.9 GM/DL
MCV RBC AUTO: 93.7 FL
MONOCYTES # BLD AUTO: 0.42 K/UL
MONOCYTES NFR BLD AUTO: 8.5 %
NEUTROPHILS # BLD AUTO: 3.28 K/UL
NEUTROPHILS NFR BLD AUTO: 66.8 %
PLATELET # BLD AUTO: 300 K/UL
POTASSIUM SERPL-SCNC: 4.4 MMOL/L
PROT SERPL-MCNC: 5.9 G/DL
RBC # BLD: 4.42 M/UL
RBC # FLD: 12.7 %
SODIUM SERPL-SCNC: 141 MMOL/L
WBC # FLD AUTO: 4.92 K/UL

## 2022-07-28 ENCOUNTER — APPOINTMENT (OUTPATIENT)
Dept: HEMATOLOGY ONCOLOGY | Facility: CLINIC | Age: 62
End: 2022-07-28

## 2022-07-28 VITALS
DIASTOLIC BLOOD PRESSURE: 85 MMHG | OXYGEN SATURATION: 99 % | WEIGHT: 149 LBS | RESPIRATION RATE: 18 BRPM | BODY MASS INDEX: 23.39 KG/M2 | TEMPERATURE: 98.2 F | SYSTOLIC BLOOD PRESSURE: 138 MMHG | HEIGHT: 66.93 IN | HEART RATE: 86 BPM

## 2022-07-28 DIAGNOSIS — C54.1 MALIGNANT NEOPLASM OF ENDOMETRIUM: ICD-10-CM

## 2022-07-28 PROCEDURE — 99215 OFFICE O/P EST HI 40 MIN: CPT

## 2022-07-28 NOTE — HISTORY OF PRESENT ILLNESS
[Disease: _____________________] : Disease: [unfilled] [de-identified] : The patient was diagnosed with endometrial cancer in June 2020 at the age of 60.  She presented to GYN with postmenopausal bleeding in 12/2019. She had not seen a gynecologist in 27 years and had significant history of severe vulvodynia and resultant refusal of pelvic exams. She noted intermittent postmenopausal spotting for greater than 2 years. Occurred once in 2018, once in 2019 then returned in Jan 2020 almost every day.  2/25/20 Pelvic US:  uterus- left fundal leiomyoma measuring 3.9 x 2.5 x 4.4 cm, and another right-sided leiomyoma measures 2.3 x 2 x 2.7 cm.  3/5/2020- Pelvic MRI:   uterus-  several leiomyomata including 2 pedunculated left- sided leiomyoma measuring 3.2 x and 2.5 cm,  as well as a fundal subserosal leiomyoma measuring 2.8 cm, with an apparent pedunculated 2.5 cm.  No pelvic lymphadenopathy.  D&C 6/12/20:  grade 2 endometrial cancer with clear cell features. A robotic TLH/BSO on 6/22/20 revealed a 3.5 cm endometrioid adenocarcinoma without myometrial invasion and 4 negative pelvic lymph nodes. The pelvic washings were negative for malignant cells. MMR: loss of nuclear expression of MLH1/PMS2; methylation detected, indicative of sporadic mutation. No adjuvant therapy was indicated.  She had a postop checkup due to complaints of vaginal spotting on 12/8/2020. A 3 cm vaginal cuff lesion was noted and biopsy was performed and a friable mass was seen. Pathology showed a clear cell carcinoma.  She was deemed not a surgical candidate and is s/p EBRT with Dr. Katherine Dickerson with plans for brachytherapy as well.    [de-identified] : PMH:  She has significant history of anxiety and severe vulvodynia and resultant refusal of pelvic exams as well as sexual dysfunction.\par PSH: lumpectomy, multiple vulvar surgeries due to vulvodynia (Bartholin gland, hymenal tissue/scar tissue removed) under care of Dr. Jefferson Downs at [place]. \par FH:: father- mesothelioma, brother- bladder cancer, maternal grandmother with breast and colon cancer, maternal great aunt with breast, maternal second cousin with breast cancer.\par HM:  Mammo/ SBE/ CBE- followed by Dr. Briscoe (diffuse cystic mastopathy) - [facility] 9/2020 BIRADS2\par EGD- 10/2016- hiatal hernia\par Colonoscopy/EGD- 10/2016- negative per patient; had f/u 8/2020 which had a benign polyp per patient \par \par  [de-identified] : She has completed EBRT in the last week of February 2021 and tolerated it well, except for vulvar irritation. She had a PET 6/4/2021 which showed minimal soft tissue thickening in the region of the left vaginal cuff which is non-FDG avid, unchanged since March 23, 2021 and considerably decreased as compared to CT December 24, 2020.  No evidence of FDG avid distant metastatic disease.\par \par She has occasional spotting that stopped ~ 2-3 weeks and resumed mid March, with occasional spotting. She denies any other associated signs and symptoms. She reports chronic right hip pain.  She continues to have spotting and vaginal bleeding on a daily basis, intermittent vulvodynia.

## 2022-07-28 NOTE — RESULTS/DATA
[FreeTextEntry1] : 7/22/22 CT A/P: Status post hysterectomy. No evidence of metastatic disease in the abdomen or pelvis.\par \par 3/22/22 CT C/A/P: Stable soft tissue prominence of the left vaginal cuff. No measurable disease. No evidence of metastatic disease in the chest.\par \par 12/22/21 CT C/A/P: Stable appearance of the chest compared with 3/24/2021. Interval significant decrease in size of previously noted mass at the left vaginal apex since 12/24/2020. Minimal soft tissue prominence is noted in this region as described above. Interval resolution of previously noted right hydroureteronephrosis since PET/CT dated 9/22/2021. Previously noted distal right ureteral calculus is no longer seen.\par \par 9/22/21 PET: 1.  New moderate right hydronephrosis and right hydroureter to the level of the UVJ, probably secondary to an obstructive right UVJ/distal ureteral calculus. Urologic consultation is suggested. No definite FDG avid soft tissue region of the right vaginal cuff or right pelvis to explain the new right hydronephrosis/right hydroureter.\par 2.  Postsurgical changes of hysterectomy and bilateral salpingo-oophorectomy. Fiducial markers in the left vaginal cuff, with unchanged, minimal non-FDG avid soft tissue prominence.\par 3.  No scan evidence of FDG avid locoregional or distant metastatic disease.\par \par 6/22/2021 PET/CT - Minimal soft tissue thickening in the region of the left vaginal cuff which is non-FDG avid, unchanged since March 23, 2021 and considerably decreased as compared to CT December 24, 2020.  No evidence of FDG avid distant metastatic disease.\par \par 3/24/21 CT Chest:  SHAINA\par \par 3/23/21 MRI Pelvis:  Status post hysterectomy previously demonstrated patchy cuff lesion today measures 1.5 x 1.9 x 1.3 cm in size with peripheral enhancement and possible central necrosis. This represents a decrease in size from prior CT scan where it measured 3.0 x 3.1 cm\par \par 12/24/20 CT C/A/P:  3.1 cm solid mass adjacent to the left vaginal apex suspicious for recurrent disease.  No evidence of metastatic disease in the thorax or abdomen.\par \par 12/22/20 path:  Vaginal cuff mass, biopsy:\par  - Clear cell carcinoma.  The tumor cells are focally positive for Napsin, and ER, and negative for WA, supporting the above diagnosis\par \par 6/10/20 Pathology:\par Uterus, cervix, bilateral fallopian tubes and ovaries, total hysterectomy and bilateral salpingo-oophorectomy:  Endometrial endometrioid carcinoma (FIGO I).  Tumor is extending into the adenomyosis foci.  Leiomyomas.  Adenomyosis. Unremarkable cervix.  Unremarkable bilateral ovaries and fallopian tubes\par Lymph node, pelvic sentinel, right, excision: Two lymph nodes negative for tumor (0/2) ( H and E and immunostain AE1-AE3)\par Uterus, lower uterine segment fibroid, removal:  Leiomyoma with degenerative changes\par Fallopian tube,  paratubal cyst, right, removal:  Unremarkable fallopian tube with microcalcifications. Paratubal cysts\par Lymph node, pelvic sentinel, left, excision:  Four  lymph nodes negative for tumor (0/7)( H and E and immunostain AE1-AE3)\par Comment :  Final grading of the tumor is grade II in correlation with patient prior biopsy. Of note, no myometrial invasion is identified, what was seen in the original frozen section slide is best interpreted as involvement / extension of the tumor into the foci of adenomyosis rather than myometrial invasion.\par Synoptic Summary\par Procedure:  Total hysterectomy and bilateral salpingo-oophorectomy\par Specimen Integrity:   Intact hysterectomy\par Tumor Site:   Anterior endometrium and Posterior endometrium\par Tumor Size: 3.5  cm ( greatest dimension)\par Histologic Type:  Endometrioid adenocarcinoma\par Histologic Grade:   FIGO grade II\par Myometrial Invasion:   Not present\par Adenomyosis: present - involved by carcinoma\par Uterine Serosa Involvement:   Not identified\par Lower Uterine Segment Involvement:  Not identified\par Cervical Stromal Involvement:   Not involved\par Other Tissue/Organs involvement:   Not involved\par Peritoneal or Ascitic Fluid:  Please see result ([id number])\par Margins:    Not applicable\par Lymphovascular Invasion:   Not identified\par Regional Lymph Nodes:\par Pelvic Lymph nodes:\par Number of Pelvic Nodes with Macrometastasis (>2mm):  0\par Number of Pelvic Nodes with Micrometastasis (>0.2 mm up to 2mm): 0\par Number of Pelvic Nodes with Isolated Tumor Cells (0.2 mm or less): 0\par Laterality of Pelvic Nodes with Tumor: N/A\par Total Number of Pelvic Nodes Examined (sentinel and non-sentinel): 6\par Number of Pelvic [place] Nodes Examined: 6\par Laterality of Pelvic Nodes Examined: Left and right\par Para-aortic Lymph nodes:  No lymph nodes submitted\par Pathologic Stage Classification (pTNM, AJCC 8th Edition)\par TNM descriptors : N/A\par Primary Tumor (pT):  pT1a\par Regional Lymph Nodes (pN) or pN (sn):   pN0\par Distant Metastasis (pM):  pM0\par FIGO Stage (2015 FIGO Cancer Report):  Ia\par Additional Pathologic Findings:    None\par Clinical History:   None\par Ancillary Studies:  (MMR IHC)\par Immunohistochemistry Testing (IHC) for Mismatch Repair Proteins performed on tissue block 1H shows the following result:\par MLH1 : Loss of nuclear expression\par MSH2 :  Intact nuclear expression\par MSH6:  Intact nuclear expression\par PMS2 : Loss of nuclear expression\par Interpretation: There is a loss of nuclear expression in MLH1 and PMS2, while MSH2 and MSH6 show intact nuclear expression. Loss of nuclear expression of MLH1 and PMS2 is supportive of DNA mismatch repair deficiency and high frequency of microsatellite instability (MSI-H).\par \par 6/10/20 Pathology:  Endocervix curettings - Fragments of endometrial adenocarcinoma morphologically similar to part 2.  Scant benign cervical tissue \par Endometrial curettings - Endometrial endometrioid adenocarcinoma with focal squamous and mucinous differentiation, FIGO grade 2 (see comment)\par Comment: Focal areas of tumor with clear cell morphology are seen admixed with fragments of endometrioid adenocarcinoma.

## 2022-09-02 ENCOUNTER — APPOINTMENT (OUTPATIENT)
Dept: FAMILY MEDICINE | Facility: CLINIC | Age: 62
End: 2022-09-02

## 2022-09-02 VITALS
DIASTOLIC BLOOD PRESSURE: 84 MMHG | HEART RATE: 110 BPM | BODY MASS INDEX: 23.74 KG/M2 | SYSTOLIC BLOOD PRESSURE: 136 MMHG | WEIGHT: 151.25 LBS | OXYGEN SATURATION: 98 % | HEIGHT: 67 IN | TEMPERATURE: 97.2 F

## 2022-09-02 LAB
BILIRUB UR QL STRIP: NORMAL
CLARITY UR: CLEAR
COLLECTION METHOD: NORMAL
GLUCOSE UR-MCNC: NORMAL
HCG UR QL: 0.2 EU/DL
HGB UR QL STRIP.AUTO: NORMAL
KETONES UR-MCNC: NORMAL
LEUKOCYTE ESTERASE UR QL STRIP: NORMAL
NITRITE UR QL STRIP: NORMAL
PH UR STRIP: 6
PROT UR STRIP-MCNC: NORMAL
SP GR UR STRIP: 1

## 2022-09-02 PROCEDURE — 99213 OFFICE O/P EST LOW 20 MIN: CPT | Mod: 25

## 2022-09-02 PROCEDURE — 81003 URINALYSIS AUTO W/O SCOPE: CPT | Mod: QW

## 2022-09-02 NOTE — HISTORY OF PRESENT ILLNESS
[FreeTextEntry8] : 62 year old female presents for concerns of UTI. Has urinary burning and urgency. Took home test that was positive. Has hx of kidney stones but denies back pain at this time. Has intermittent side pain, has had US and CT scans in the past.

## 2022-09-02 NOTE — PLAN
[FreeTextEntry1] : UTI- Pt with abnormal ua and symptomatic\par - Followup urine culture\par - Will start abx , pt has had reactions to cipro. Has taken augmentin for UTI with no side effects\par - diflucan PRN if yeast infx develops \par - Advised to increase hydration. f/u if no relief\par

## 2022-09-02 NOTE — PHYSICAL EXAM
[No Acute Distress] : no acute distress [Well-Appearing] : well-appearing [Normal Sclera/Conjunctiva] : normal sclera/conjunctiva [Normal Outer Ear/Nose] : the outer ears and nose were normal in appearance [No Respiratory Distress] : no respiratory distress  [No Accessory Muscle Use] : no accessory muscle use [Normal Rate] : normal rate  [No CVA Tenderness] : no CVA  tenderness [Normal Affect] : the affect was normal [Normal Insight/Judgement] : insight and judgment were intact

## 2022-09-06 LAB — BACTERIA UR CULT: ABNORMAL

## 2022-09-07 ENCOUNTER — OUTPATIENT (OUTPATIENT)
Dept: OUTPATIENT SERVICES | Facility: HOSPITAL | Age: 62
LOS: 1 days | End: 2022-09-07
Payer: COMMERCIAL

## 2022-09-07 ENCOUNTER — APPOINTMENT (OUTPATIENT)
Dept: ULTRASOUND IMAGING | Facility: CLINIC | Age: 62
End: 2022-09-07

## 2022-09-07 DIAGNOSIS — R39.89 OTHER SYMPTOMS AND SIGNS INVOLVING THE GENITOURINARY SYSTEM: ICD-10-CM

## 2022-09-07 DIAGNOSIS — N20.0 CALCULUS OF KIDNEY: ICD-10-CM

## 2022-09-07 DIAGNOSIS — Z98.890 OTHER SPECIFIED POSTPROCEDURAL STATES: Chronic | ICD-10-CM

## 2022-09-07 DIAGNOSIS — N90.7 VULVAR CYST: Chronic | ICD-10-CM

## 2022-09-07 PROCEDURE — 76770 US EXAM ABDO BACK WALL COMP: CPT

## 2022-09-07 PROCEDURE — 76770 US EXAM ABDO BACK WALL COMP: CPT | Mod: 26

## 2022-09-20 ENCOUNTER — APPOINTMENT (OUTPATIENT)
Dept: GYNECOLOGIC ONCOLOGY | Facility: CLINIC | Age: 62
End: 2022-09-20

## 2022-09-27 ENCOUNTER — APPOINTMENT (OUTPATIENT)
Dept: GYNECOLOGIC ONCOLOGY | Facility: CLINIC | Age: 62
End: 2022-09-27

## 2022-09-27 VITALS
HEIGHT: 67 IN | HEART RATE: 89 BPM | DIASTOLIC BLOOD PRESSURE: 90 MMHG | BODY MASS INDEX: 23.39 KG/M2 | WEIGHT: 149 LBS | SYSTOLIC BLOOD PRESSURE: 136 MMHG

## 2022-09-27 PROCEDURE — 99213 OFFICE O/P EST LOW 20 MIN: CPT

## 2022-10-17 NOTE — ASU PATIENT PROFILE, ADULT - BLOOD TRANSFUSION, PREVIOUS, PROFILE
Problem: Adult Inpatient Plan of Care  Goal: Plan of Care Review  Outcome: Ongoing, Progressing     VIRTUAL NURSE:  Labs, notes, orders, and careplan reviewed.     no

## 2022-10-20 ENCOUNTER — TRANSCRIPTION ENCOUNTER (OUTPATIENT)
Age: 62
End: 2022-10-20

## 2022-10-25 ENCOUNTER — APPOINTMENT (OUTPATIENT)
Dept: GYNECOLOGIC ONCOLOGY | Facility: CLINIC | Age: 62
End: 2022-10-25

## 2022-10-25 VITALS
BODY MASS INDEX: 23.23 KG/M2 | DIASTOLIC BLOOD PRESSURE: 96 MMHG | SYSTOLIC BLOOD PRESSURE: 156 MMHG | HEIGHT: 67 IN | HEART RATE: 105 BPM | WEIGHT: 148 LBS

## 2022-10-25 PROCEDURE — 99213 OFFICE O/P EST LOW 20 MIN: CPT

## 2022-10-25 RX ORDER — AMOXICILLIN AND CLAVULANATE POTASSIUM 500; 125 MG/1; MG/1
500-125 TABLET, FILM COATED ORAL
Qty: 10 | Refills: 0 | Status: DISCONTINUED | COMMUNITY
Start: 2022-09-02 | End: 2022-10-25

## 2022-10-25 RX ORDER — ESCITALOPRAM OXALATE 10 MG/1
10 TABLET ORAL
Qty: 30 | Refills: 5 | Status: DISCONTINUED | COMMUNITY
Start: 2022-03-28 | End: 2022-10-25

## 2022-10-25 RX ORDER — FLUCONAZOLE 150 MG/1
150 TABLET ORAL
Qty: 2 | Refills: 1 | Status: DISCONTINUED | COMMUNITY
Start: 2022-09-02 | End: 2022-10-25

## 2022-10-25 NOTE — HISTORY OF PRESENT ILLNESS
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 \par \par She underwent HSC/D&C on 6/10/20 and pathology demonstrated FIGO grade 2 EMC with clear cell features. \par She subsequently underwent robotic TLH/BSO/staging on 6/22/20.\par \par Final pathology demonstrated stage IA non-myoinvasive FIGO grade 2 (on initial path) EMC.Stains confirmed endometrioid adenocarcinoma with focal clear cell features, not clear cell carcinoma. 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 [facility] Downs at [facility]. \par Family history of cancer: father- mesothelioma, brother- bladder cancer, maternal grandmother with breast and colon cancer, maternal great aunt with breast, maternal second cousin with breast cancer.\par She had genetic counseling in 8/2020. She did not undergo testing after completing counseling.\par She reports chronic right hip pain.  \par \par She was seen for routine followup cuff check on 11/17/20 and had a normal exam.\par She called office in December to report vaginal spotting, and a new vaginal soft-tissue mass was seen on exam. Biopsy demonstrated clear cell carcinoma. CT C/A/P demonstrated a 3cm vaginal cuff mass and no other evidence of disease\par Tumor board recommendation was for pelvic RT + vaginal brachytherapy.\par She also sought an opinion from Medical Oncology, Dr. Suzy Childress at [facility], who agreed with our treatment recommendation. \par She initiated radiation treatment with Dr. Dickerson and completed EBRT in the last week of February, tolerated well except for vulvar irritation.\par 3/2021- Chest CT IC- negative\par 3/23/21 MRI Pelvis: previously demonstrated patchy cuff lesion measures 1.5 x 1.9 x 1.3\par     cm in size w/ peripheral enhancement and possible central necrosis - a decrease in\par     size from prior CT scan where it measured 3.0 x 3.1 cm \par She received vaginal cuff SBRT (5 doses), completion was the 2nd week of April.\par PET/CT 6/4/21: minimal soft tissue thickening in the region of the left vaginal cuff which is non-FDG avid, unchanged since March 23, 2021 and considerably decreased as compared to CT December 24, 2020. No evidence of FDG avid distant metastatic disease.\par 9/22/21 PET: 1. New moderate right hydronephrosis and right hydroureter to the level of\par     the UVJ, probably secondary to an obstructive right UVJ/distal ureteral calculus. Urologic\par     consultation is suggested. No definite FDG avid soft tissue region of the right\par     vaginal cuff or right pelvis to explain the new right hydronephrosis/right hydroureter.\par     2. Postsurgical changes of hysterectomy and bilateral salpingo-oophorectomy. Fiducial\par     markers in the left vaginal cuff, with unchanged, minimal non-FDG\par     avid soft tissue prominence.\par     3. No scan evidence of FDG avid locoregional or distant metastatic disease.\par \par 12/22/21 CT Abdomen and pelvis: \par Stable appearance of the chest compared with 3/24/2021.\par Interval significant decrease in size of previously noted mass at the left vaginal apex since 12/24/2020, stable size from 9/2021 PET.  Minimal soft tissue prominence is noted.\par Interval resolution of previously noted right hydroureteronephrosis since PET/CT dated 9/22/2021. Previously noted distal right ureteral calculus is no longer seen.\par \par 3/22/22:  CT C/A/P: Stable soft tissue prominence of the left vaginal cuff. No measurable disease.\par No evidence of metastatic disease in the chest\par \par 7/22/22: CT A/P: SHAINA\par \par She comes in today due to one episode of pink staining seen on toilet paper after wiping on 10/22/22. No bleeding since. She thinks she may have a UTI; home test was positive, she has a call in to her Urologist this morning for further evaluation. \par She denies any other incidents of VB or other associated signs or symptoms. Using her dilator weekly. Vaginal dryness. \par \par HM:\par Pap- n/a\par Mammo/ SBE/ CBE- followed by Dr. Briscoe (diffuse cystic mastopathy) -\par MAMMO 11/12/21- No suspicious mammographic abnormality noted.\par EGD- 10/2016- hiatal hernia\par Colonoscopy/EGD- 10/2016- negative per patient; had f/u 8/2020 which had a benign polyp per patient \par \par Referred by (GYN) Dr. Kincaid \par PCP: Dr. Enciso\par Med Onc:  Robyn Uribe MD\par Rad Onc:  Katherine Dickerson MD\par Urologist: Dr. Amira Salas ([facility])

## 2022-10-25 NOTE — LETTER BODY
[Dear  ___] : Dear  [unfilled], [I recently saw our patient [unfilled] for a follow-up visit.] : I recently saw our patient, [unfilled] for a follow-up visit. [Attached please find my note.] : Attached please find my note. [FreeTextEntry2] : She completed pelvic radiation therapy for vaginal recurrence and is clinically without evidence of disease. She will be seeing me regularly for followup exams. \par  I will keep you updated on her progress. Thank you again for referring this [facility] patient.\par \par

## 2022-10-25 NOTE — PHYSICAL EXAM
[Chaperone Present] : A chaperone was present in the examining room during all aspects of the physical examination [Absent] : Uterus: Absent [Normal] : Recto-Vaginal Exam: Normal [Fully active, able to carry on all pre-disease performance without restriction] : Status 0 - Fully active, able to carry on all pre-disease performance without restriction [de-identified] : well-healed [de-identified] : slim spec used; patent to apex, no evidence of residual tumor [de-identified] : cuff mobile, no abnormal induration nor mass [de-identified] : normal sphincter tone, smooth rectovaginal septum, no cul-de-sac nodules.

## 2022-11-21 ENCOUNTER — APPOINTMENT (OUTPATIENT)
Dept: BREAST CENTER | Facility: CLINIC | Age: 62
End: 2022-11-21
Payer: COMMERCIAL

## 2022-11-21 VITALS
SYSTOLIC BLOOD PRESSURE: 124 MMHG | WEIGHT: 150 LBS | HEIGHT: 67 IN | HEART RATE: 77 BPM | BODY MASS INDEX: 23.54 KG/M2 | DIASTOLIC BLOOD PRESSURE: 82 MMHG

## 2022-11-21 DIAGNOSIS — Z87.39 PERSONAL HISTORY OF OTHER DISEASES OF THE MUSCULOSKELETAL SYSTEM AND CONNECTIVE TISSUE: ICD-10-CM

## 2022-11-21 DIAGNOSIS — Z80.0 FAMILY HISTORY OF MALIGNANT NEOPLASM OF DIGESTIVE ORGANS: ICD-10-CM

## 2022-11-21 PROCEDURE — 99204 OFFICE O/P NEW MOD 45 MIN: CPT

## 2022-11-21 PROCEDURE — 99214 OFFICE O/P EST MOD 30 MIN: CPT

## 2022-11-21 RX ORDER — AMPICILLIN 500 MG/1
500 CAPSULE ORAL
Qty: 10 | Refills: 0 | Status: DISCONTINUED | COMMUNITY
Start: 2022-11-01

## 2022-11-21 RX ORDER — GABAPENTIN 400 MG/1
400 CAPSULE ORAL 3 TIMES DAILY
Qty: 90 | Refills: 1 | Status: DISCONTINUED | COMMUNITY
Start: 2022-03-02 | End: 2022-11-21

## 2022-11-21 RX ORDER — CEFADROXIL 500 MG/1
500 CAPSULE ORAL
Qty: 10 | Refills: 0 | Status: DISCONTINUED | COMMUNITY
Start: 2022-11-11

## 2022-11-21 RX ORDER — GABAPENTIN 100 MG/1
CAPSULE ORAL
Refills: 0 | Status: ACTIVE | COMMUNITY

## 2022-11-21 NOTE — CONSULT LETTER
[Dear  ___] : Dear  [unfilled], [Consult Letter:] : I had the pleasure of evaluating your patient, [unfilled]. [Please see my note below.] : Please see my note below. [Consult Closing:] : Thank you very much for allowing me to participate in the care of this patient.  If you have any questions, please do not hesitate to contact me. [Sincerely,] : Sincerely, [FreeTextEntry3] : Sonal Mcdaniel MD FACS  [DrLucien  ___] : Dr. LAGUNAS

## 2022-11-21 NOTE — HISTORY OF PRESENT ILLNESS
[FreeTextEntry1] : Ms. Ruvalcaba is a 62 year old woman who presents for a consultation for a family history of breast cancer. Her breast history is notable for a benign in-office breast excision and a benign left breast needle biopsy. She previously saw Dr. Mary Schaeffer, then Dr. Nettie Flowers, then Dr. Mahsa Briscoe for her breast exams. She is asymptomatic. No palpable breast or axillary lumps, nipple discharge, or skin changes. \par \par Her family history is significant for breast cancer in the maternal grandmother in her 80's, a maternal great-aunt in her late 40's, and her mother's cousin (daughter of a different maternal great-aunt) at 29; none of them had any genetic testing.

## 2022-11-21 NOTE — DATA REVIEWED
[FreeTextEntry1] : B/l mammogram 11/15/22\par - extremely dense\par - L post-surgical changes\par - BIRADS 2\par \par B/l complete US 11/15/22\par - b/l cysts\par - 0.5 cm nodule in L breast 2:00 N4, stable\par - 0.5 cm nodule in L breast 5:00 N3, stable\par - BIRADS 2

## 2022-11-22 ENCOUNTER — APPOINTMENT (OUTPATIENT)
Dept: SURGERY | Facility: CLINIC | Age: 62
End: 2022-11-22

## 2022-12-22 ENCOUNTER — APPOINTMENT (OUTPATIENT)
Dept: FAMILY MEDICINE | Facility: CLINIC | Age: 62
End: 2022-12-22

## 2022-12-22 VITALS
WEIGHT: 148 LBS | HEIGHT: 67 IN | SYSTOLIC BLOOD PRESSURE: 140 MMHG | DIASTOLIC BLOOD PRESSURE: 91 MMHG | TEMPERATURE: 96.9 F | OXYGEN SATURATION: 98 % | HEART RATE: 94 BPM | BODY MASS INDEX: 23.23 KG/M2

## 2022-12-22 DIAGNOSIS — F32.A DEPRESSION, UNSPECIFIED: ICD-10-CM

## 2022-12-22 DIAGNOSIS — Z00.00 ENCOUNTER FOR GENERAL ADULT MEDICAL EXAMINATION W/OUT ABNORMAL FINDINGS: ICD-10-CM

## 2022-12-22 DIAGNOSIS — R05.9 COUGH, UNSPECIFIED: ICD-10-CM

## 2022-12-22 DIAGNOSIS — R82.71 BACTERIURIA: ICD-10-CM

## 2022-12-22 LAB
BILIRUB UR QL STRIP: NORMAL
CLARITY UR: CLEAR
COLLECTION METHOD: NORMAL
GLUCOSE UR-MCNC: NORMAL
HCG UR QL: 0.2 EU/DL
HGB UR QL STRIP.AUTO: NORMAL
KETONES UR-MCNC: NORMAL
LEUKOCYTE ESTERASE UR QL STRIP: NORMAL
NITRITE UR QL STRIP: NORMAL
PH UR STRIP: 5.5
PROT UR STRIP-MCNC: NORMAL
SP GR UR STRIP: 1

## 2022-12-22 PROCEDURE — 81003 URINALYSIS AUTO W/O SCOPE: CPT | Mod: QW

## 2022-12-22 PROCEDURE — 99214 OFFICE O/P EST MOD 30 MIN: CPT | Mod: 25

## 2022-12-22 NOTE — HISTORY OF PRESENT ILLNESS
[FreeTextEntry8] : Dry cough for several days no fever no shortness of breath no wheezing Home COVID test negative patient has not had flu shot and refuses 1 at this time.  Physical exam no apparent distress chest is clear pulse is 60.  Recommend over-the-counter cough medicine and fluids\par \par Sees oncologist every 6 months for endometrial cancer no evidence of recurrence\par \par Repeated mild urinary tract symptoms with less than 100,000 bacteria count CAT scan in July of kidneys was negative being repeatedly treated by urology patient told to follow-up with me\par \par Vitamin D deficiency on 5000 units we will check level\par \par Patient has unhappy marriage is conflicted  pays no attention to her he is a hoarder house is overflowing with boxes and he tells her to go and live at her mother's house which is now empty she would like to speak to a therapist

## 2022-12-23 LAB — 25(OH)D3 SERPL-MCNC: 49.9 NG/ML

## 2022-12-29 ENCOUNTER — RESULT REVIEW (OUTPATIENT)
Age: 62
End: 2022-12-29

## 2023-01-24 ENCOUNTER — APPOINTMENT (OUTPATIENT)
Dept: CT IMAGING | Facility: CLINIC | Age: 63
End: 2023-01-24
Payer: COMMERCIAL

## 2023-01-24 ENCOUNTER — APPOINTMENT (OUTPATIENT)
Dept: NUCLEAR MEDICINE | Facility: CLINIC | Age: 63
End: 2023-01-24

## 2023-01-24 ENCOUNTER — OUTPATIENT (OUTPATIENT)
Dept: OUTPATIENT SERVICES | Facility: HOSPITAL | Age: 63
LOS: 1 days | End: 2023-01-24
Payer: COMMERCIAL

## 2023-01-24 DIAGNOSIS — Z98.890 OTHER SPECIFIED POSTPROCEDURAL STATES: Chronic | ICD-10-CM

## 2023-01-24 DIAGNOSIS — N90.7 VULVAR CYST: Chronic | ICD-10-CM

## 2023-01-24 DIAGNOSIS — C54.1 MALIGNANT NEOPLASM OF ENDOMETRIUM: ICD-10-CM

## 2023-01-24 DIAGNOSIS — Z00.8 ENCOUNTER FOR OTHER GENERAL EXAMINATION: ICD-10-CM

## 2023-01-24 PROCEDURE — 74177 CT ABD & PELVIS W/CONTRAST: CPT

## 2023-01-24 PROCEDURE — 74177 CT ABD & PELVIS W/CONTRAST: CPT | Mod: 26

## 2023-01-26 ENCOUNTER — OUTPATIENT (OUTPATIENT)
Dept: OUTPATIENT SERVICES | Facility: HOSPITAL | Age: 63
LOS: 1 days | Discharge: HOME | End: 2023-01-26

## 2023-01-26 DIAGNOSIS — N93.9 ABNORMAL UTERINE AND VAGINAL BLEEDING, UNSPECIFIED: ICD-10-CM

## 2023-01-26 DIAGNOSIS — N90.7 VULVAR CYST: Chronic | ICD-10-CM

## 2023-01-26 DIAGNOSIS — Z98.890 OTHER SPECIFIED POSTPROCEDURAL STATES: Chronic | ICD-10-CM

## 2023-02-01 ENCOUNTER — APPOINTMENT (OUTPATIENT)
Dept: HEMATOLOGY ONCOLOGY | Facility: CLINIC | Age: 63
End: 2023-02-01
Payer: COMMERCIAL

## 2023-02-01 VITALS
TEMPERATURE: 98.5 F | OXYGEN SATURATION: 97 % | SYSTOLIC BLOOD PRESSURE: 108 MMHG | HEIGHT: 66.93 IN | WEIGHT: 149.5 LBS | BODY MASS INDEX: 23.46 KG/M2 | DIASTOLIC BLOOD PRESSURE: 73 MMHG | RESPIRATION RATE: 18 BRPM | HEART RATE: 76 BPM

## 2023-02-01 DIAGNOSIS — N20.0 CALCULUS OF KIDNEY: ICD-10-CM

## 2023-02-01 PROCEDURE — 99215 OFFICE O/P EST HI 40 MIN: CPT

## 2023-02-01 NOTE — HISTORY OF PRESENT ILLNESS
[Disease: _____________________] : Disease: [unfilled] [de-identified] : The patient was diagnosed with endometrial cancer in June 2020 at the age of 60.  She presented to GYN with postmenopausal bleeding in 12/2019. She had not seen a gynecologist in 27 years and had significant history of severe vulvodynia and resultant refusal of pelvic exams. She noted intermittent postmenopausal spotting for greater than 2 years. Occurred once in 2018, once in 2019 then returned in Jan 2020 almost every day.  2/25/20 Pelvic US:  uterus- left fundal leiomyoma measuring 3.9 x 2.5 x 4.4 cm, and another right-sided leiomyoma measures 2.3 x 2 x 2.7 cm.  3/5/2020- Pelvic MRI:   uterus-  several leiomyomata including 2 pedunculated left- sided leiomyoma measuring 3.2 x and 2.5 cm,  as well as a fundal subserosal leiomyoma measuring 2.8 cm, with an apparent pedunculated 2.5 cm.  No pelvic lymphadenopathy.  D&C 6/12/20:  grade 2 endometrial cancer with clear cell features. A robotic TLH/BSO on 6/22/20 revealed a 3.5 cm endometrioid adenocarcinoma without myometrial invasion and 4 negative pelvic lymph nodes. The pelvic washings were negative for malignant cells. MMR: loss of nuclear expression of MLH1/PMS2; methylation detected, indicative of sporadic mutation. No adjuvant therapy was indicated.  She had a postop checkup due to complaints of vaginal spotting on 12/8/2020. A 3 cm vaginal cuff lesion was noted and biopsy was performed and a friable mass was seen. Pathology showed a clear cell carcinoma.  She was deemed not a surgical candidate and is s/p EBRT with Dr. Katherine Dickerson with plans for brachytherapy as well.    [de-identified] : PMH:  She has significant history of anxiety and severe vulvodynia and resultant refusal of pelvic exams as well as sexual dysfunction.\par PSH: lumpectomy, multiple vulvar surgeries due to vulvodynia (Bartholin gland, hymenal tissue/scar tissue removed) under care of [facility]. [facility] at [facility]. \par FH:: father- mesothelioma, brother- bladder cancer, maternal grandmother with breast and colon cancer, maternal great aunt with breast, maternal second cousin with breast cancer.\par HM:  Mammo/ SBE/ CBE- followed by Dr. Briscoe (diffuse cystic mastopathy) - [facility] 9/2020 BIRADS2\par EGD- 10/2016- hiatal hernia\par Colonoscopy/EGD- 10/2016- negative per patient; had f/u 8/2020 which had a benign polyp per patient \par \par  [de-identified] : She has completed EBRT in the last week of February 2021 and tolerated it well, except for vulvar irritation. She had a PET 6/4/2021 which showed minimal soft tissue thickening in the region of the left vaginal cuff which is non-FDG avid, unchanged since March 23, 2021 and considerably decreased as compared to CT December 24, 2020.  No evidence of FDG avid distant metastatic disease.\par \par She has occasional spotting that stopped ~ 2-3 weeks and resumed mid March, with occasional spotting. She denies any other associated signs and symptoms. She reports chronic right hip pain.

## 2023-02-01 NOTE — RESULTS/DATA
[FreeTextEntry1] : 1/24/23 CT:  Stable exam. No evidence of recurrent or metastatic disease.\par \par 7/22/22 CT A/P: Status post hysterectomy. No evidence of metastatic disease in the abdomen or pelvis.\par \par 3/22/22 CT C/A/P: Stable soft tissue prominence of the left vaginal cuff. No measurable disease. No evidence of metastatic disease in the chest.\par \par 12/22/21 CT C/A/P: Stable appearance of the chest compared with 3/24/2021. Interval significant decrease in size of previously noted mass at the left vaginal apex since 12/24/2020. Minimal soft tissue prominence is noted in this region as described above. Interval resolution of previously noted right hydroureteronephrosis since PET/CT dated 9/22/2021. Previously noted distal right ureteral calculus is no longer seen.\par \par 9/22/21 PET: 1.  New moderate right hydronephrosis and right hydroureter to the level of the UVJ, probably secondary to an obstructive right UVJ/distal ureteral calculus. Urologic consultation is suggested. No definite FDG avid soft tissue region of the right vaginal cuff or right pelvis to explain the new right hydronephrosis/right hydroureter.\par 2.  Postsurgical changes of hysterectomy and bilateral salpingo-oophorectomy. Fiducial markers in the left vaginal cuff, with unchanged, minimal non-FDG avid soft tissue prominence.\par 3.  No scan evidence of FDG avid locoregional or distant metastatic disease.\par \par 6/22/2021 PET/CT - Minimal soft tissue thickening in the region of the left vaginal cuff which is non-FDG avid, unchanged since March 23, 2021 and considerably decreased as compared to CT December 24, 2020.  No evidence of FDG avid distant metastatic disease.\par \par 3/24/21 CT Chest:  SHAINA\par \par 3/23/21 MRI Pelvis:  Status post hysterectomy previously demonstrated patchy cuff lesion today measures 1.5 x 1.9 x 1.3 cm in size with peripheral enhancement and possible central necrosis. This represents a decrease in size from prior CT scan where it measured 3.0 x 3.1 cm\par \par 12/24/20 CT C/A/P:  3.1 cm solid mass adjacent to the left vaginal apex suspicious for recurrent disease.  No evidence of metastatic disease in the thorax or abdomen.\par \par 12/22/20 path:  Vaginal cuff mass, biopsy:\par  - Clear cell carcinoma.  The tumor cells are focally positive for Napsin, and ER, and negative for VT, supporting the above diagnosis\par \par 6/10/20 Pathology:\par Uterus, cervix, bilateral fallopian tubes and ovaries, total hysterectomy and bilateral salpingo-oophorectomy:  Endometrial endometrioid carcinoma (FIGO I).  Tumor is extending into the adenomyosis foci.  Leiomyomas.  Adenomyosis. Unremarkable cervix.  Unremarkable bilateral ovaries and fallopian tubes\par Lymph node, pelvic sentinel, right, excision: Two lymph nodes negative for tumor (0/2) ( H and E and immunostain AE1-AE3)\par Uterus, lower uterine segment fibroid, removal:  Leiomyoma with degenerative changes\par Fallopian tube,  paratubal cyst, right, removal:  Unremarkable fallopian tube with microcalcifications. Paratubal cysts\par Lymph node, pelvic sentinel, left, excision:  Four  lymph nodes negative for tumor (0/7)( H and E and immunostain AE1-AE3)\par Comment :  Final grading of the tumor is grade II in correlation with patient prior biopsy. Of note, no myometrial invasion is identified, what was seen in the original frozen section slide is best interpreted as involvement / extension of the tumor into the foci of adenomyosis rather than myometrial invasion.\par Synoptic Summary\par Procedure:  Total hysterectomy and bilateral salpingo-oophorectomy\par Specimen Integrity:   Intact hysterectomy\par Tumor Site:   Anterior endometrium and Posterior endometrium\par Tumor Size: 3.5  cm ( greatest dimension)\par Histologic Type:  Endometrioid adenocarcinoma\par Histologic Grade:   FIGO grade II\par Myometrial Invasion:   Not present\par Adenomyosis: present - involved by carcinoma\par Uterine Serosa Involvement:   Not identified\par Lower Uterine Segment Involvement:  Not identified\par Cervical Stromal Involvement:   Not involved\par Other Tissue/Organs involvement:   Not involved\par Peritoneal or Ascitic Fluid:  Please see result ([id number])\par Margins:    Not applicable\par Lymphovascular Invasion:   Not identified\par Regional Lymph Nodes:\par Pelvic Lymph nodes:\par Number of Pelvic Nodes with Macrometastasis (>2mm):  0\par Number of Pelvic Nodes with Micrometastasis (>0.2 mm up to 2mm): 0\par Number of Pelvic Nodes with Isolated Tumor Cells (0.2 mm or less): 0\par Laterality of Pelvic Nodes with Tumor: N/A\par Total Number of Pelvic Nodes Examined (sentinel and non-sentinel): 6\par Number of Pelvic Hidalgo Nodes Examined: 6\par Laterality of Pelvic Nodes Examined: Left and right\par Para-aortic Lymph nodes:  No lymph nodes submitted\par Pathologic Stage Classification (pTNM, AJCC 8th Edition)\par TNM descriptors : N/A\par Primary Tumor (pT):  pT1a\par Regional Lymph Nodes (pN) or pN (sn):   pN0\par Distant Metastasis (pM):  pM0\par FIGO Stage (2015 FIGO Cancer Report):  Ia\par Additional Pathologic Findings:    None\par Clinical History:   None\par Ancillary Studies:  (MMR IHC)\par Immunohistochemistry Testing (IHC) for Mismatch Repair Proteins performed on tissue block 1H shows the following result:\par MLH1 : Loss of nuclear expression\par MSH2 :  Intact nuclear expression\par MSH6:  Intact nuclear expression\par PMS2 : Loss of nuclear expression\par Interpretation: There is a loss of nuclear expression in MLH1 and PMS2, while MSH2 and MSH6 show intact nuclear expression. Loss of nuclear expression of MLH1 and PMS2 is supportive of DNA mismatch repair deficiency and high frequency of microsatellite instability (MSI-H).\par \par 6/10/20 Pathology:  Endocervix curettings - Fragments of endometrial adenocarcinoma morphologically similar to part 2.  Scant benign cervical tissue \par Endometrial curettings - Endometrial endometrioid adenocarcinoma with focal squamous and mucinous differentiation, FIGO grade 2 (see comment)\par Comment: Focal areas of tumor with clear cell morphology are seen admixed with fragments of endometrioid adenocarcinoma.

## 2023-02-01 NOTE — ASSESSMENT
[FreeTextEntry1] : Most relapses that occur among patients without prior radiation therapy are vaginal recurrences. Data regarding treatment of isolated vaginal relapse are limited, but outcomes appear favorable based on observational studies with RT and the few studies of surgical treatment for this patient population. In the PORTEC-1 trial, among 30 women without prior RT who experienced an isolated vaginal recurrence, the complete response rate to curative-intent treatment was 87 percent.  For most women (28 patients), curative-intent treatment consisted of whole-pelvic RT, with or without brachytherapy, although one of these patients also received surgery, and two were also treated with endocrine therapy.  \par \par For patients with extravaginal extension or pelvic lymph node involvement, the prognosis is worse. In PORTEC-1, only 4 of 10 patients who were treated for pelvic relapse with RT with curative intent reached complete remission.\par There is an [place] [facility], which randomly assigns these patients to RT with or without concurrent sensitizing cisplatin but is still considered investigational.  \par \par For patients treated with curative intent for locoregional recurrence, surveillance is necessary after treatment. Per the NCCN guidelines, CT CAP every six months for the first three years and then every 6 to 12 months for the following two years is recommended.

## 2023-02-02 DIAGNOSIS — C54.1 MALIGNANT NEOPLASM OF ENDOMETRIUM: ICD-10-CM

## 2023-02-02 DIAGNOSIS — N20.0 CALCULUS OF KIDNEY: ICD-10-CM

## 2023-02-02 DIAGNOSIS — F41.9 ANXIETY DISORDER, UNSPECIFIED: ICD-10-CM

## 2023-02-02 LAB
ALBUMIN SERPL ELPH-MCNC: 4.4 G/DL
ALP BLD-CCNC: 95 U/L
ALT SERPL-CCNC: 43 U/L
ANION GAP SERPL CALC-SCNC: 9 MMOL/L
AST SERPL-CCNC: 33 U/L
BASOPHILS # BLD AUTO: 0.05 K/UL
BASOPHILS NFR BLD AUTO: 1 %
BILIRUB SERPL-MCNC: 0.8 MG/DL
BUN SERPL-MCNC: 17 MG/DL
CALCIUM SERPL-MCNC: 9.8 MG/DL
CANCER AG125 SERPL-ACNC: 7 U/ML
CHLORIDE SERPL-SCNC: 105 MMOL/L
CO2 SERPL-SCNC: 28 MMOL/L
CREAT SERPL-MCNC: 0.69 MG/DL
EGFR: 99 ML/MIN/1.73M2
EOSINOPHIL # BLD AUTO: 0.1 K/UL
EOSINOPHIL NFR BLD AUTO: 2 %
FOLATE SERPL-MCNC: 19.3 NG/ML
GLUCOSE SERPL-MCNC: 91 MG/DL
HCT VFR BLD CALC: 46.8 %
HGB BLD-MCNC: 14.9 G/DL
IMM GRANULOCYTES NFR BLD AUTO: 0.6 %
LYMPHOCYTES # BLD AUTO: 0.95 K/UL
LYMPHOCYTES NFR BLD AUTO: 18.9 %
MAGNESIUM SERPL-MCNC: 2.3 MG/DL
MAN DIFF?: NORMAL
MCHC RBC-ENTMCNC: 31 PG
MCHC RBC-ENTMCNC: 31.8 GM/DL
MCV RBC AUTO: 97.3 FL
MONOCYTES # BLD AUTO: 0.42 K/UL
MONOCYTES NFR BLD AUTO: 8.3 %
NEUTROPHILS # BLD AUTO: 3.48 K/UL
NEUTROPHILS NFR BLD AUTO: 69.2 %
PLATELET # BLD AUTO: 364 K/UL
POTASSIUM SERPL-SCNC: 5 MMOL/L
PROT SERPL-MCNC: 6.6 G/DL
RBC # BLD: 4.81 M/UL
RBC # FLD: 12.7 %
SODIUM SERPL-SCNC: 142 MMOL/L
T3FREE SERPL-MCNC: 2.56 PG/ML
T4 FREE SERPL-MCNC: 1.2 NG/DL
TSH SERPL-ACNC: 2.92 UIU/ML
VIT B12 SERPL-MCNC: 483 PG/ML
WBC # FLD AUTO: 5.03 K/UL

## 2023-02-06 ENCOUNTER — OFFICE (OUTPATIENT)
Dept: URBAN - METROPOLITAN AREA CLINIC 12 | Facility: CLINIC | Age: 63
Setting detail: OPHTHALMOLOGY
End: 2023-02-06
Payer: COMMERCIAL

## 2023-02-06 DIAGNOSIS — B02.33: ICD-10-CM

## 2023-02-06 DIAGNOSIS — H26.493: ICD-10-CM

## 2023-02-06 DIAGNOSIS — H43.812: ICD-10-CM

## 2023-02-06 DIAGNOSIS — H35.3131: ICD-10-CM

## 2023-02-06 PROCEDURE — 92014 COMPRE OPH EXAM EST PT 1/>: CPT | Performed by: OPHTHALMOLOGY

## 2023-02-06 PROCEDURE — 92250 FUNDUS PHOTOGRAPHY W/I&R: CPT | Performed by: OPHTHALMOLOGY

## 2023-02-06 ASSESSMENT — VISUAL ACUITY
OS_BCVA: 20/25
OD_BCVA: 20/25

## 2023-02-06 ASSESSMENT — REFRACTION_AUTOREFRACTION
OD_AXIS: 138
OS_CYLINDER: -0.75
OD_SPHERE: +0.25
OD_CYLINDER: -0.25
OS_AXIS: 068
OS_SPHERE: +0.75

## 2023-02-06 ASSESSMENT — REFRACTION_CURRENTRX
OS_VPRISM_DIRECTION: SV
OD_SPHERE: +2.50
OD_OVR_VA: 20/
OS_OVR_VA: 20/
OS_SPHERE: +2.50
OD_VPRISM_DIRECTION: SV

## 2023-02-06 ASSESSMENT — REFRACTION_MANIFEST
OS_ADD: +2.25
OD_CYLINDER: -0.50
OD_ADD: +2.25
OD_SPHERE: +3.50
OS_CYLINDER: SPH
OS_VA1: 20/20
OD_CYLINDER: SPH
OS_CYLINDER: SPH
OD_AXIS: 93
OS_AXIS: 0
OD_VA1: 20/25
OS_SPHERE: +1.25
OD_SPHERE: +1.75
OS_SPHERE: +2.75

## 2023-02-06 ASSESSMENT — SPHEQUIV_DERIVED
OS_SPHEQUIV: 0.375
OD_SPHEQUIV: 0.125
OD_SPHEQUIV: 1.5

## 2023-02-06 ASSESSMENT — SUPERFICIAL PUNCTATE KERATITIS (SPK)
OD_SPK: 1+
OS_SPK: 1+

## 2023-02-06 ASSESSMENT — KERATOMETRY
OS_K1POWER_DIOPTERS: 43.50
OS_AXISANGLE_DEGREES: 090
OS_K2POWER_DIOPTERS: 43.50
OD_AXISANGLE_DEGREES: 104
METHOD_AUTO_MANUAL: AUTO
OD_K1POWER_DIOPTERS: 42.75
OD_K2POWER_DIOPTERS: 43.00

## 2023-02-06 ASSESSMENT — LID EXAM ASSESSMENTS
OS_BLEPHARITIS: LUL 1+
OD_BLEPHARITIS: RUL 1+

## 2023-02-06 ASSESSMENT — CONFRONTATIONAL VISUAL FIELD TEST (CVF)
OD_FINDINGS: FULL
OS_FINDINGS: FULL

## 2023-02-06 ASSESSMENT — AXIALLENGTH_DERIVED
OD_AL: 23.7742
OD_AL: 23.2423
OS_AL: 23.4467

## 2023-02-06 ASSESSMENT — TONOMETRY
OS_IOP_MMHG: 20
OD_IOP_MMHG: 20

## 2023-02-17 ENCOUNTER — APPOINTMENT (OUTPATIENT)
Dept: PHYSICAL MEDICINE AND REHAB | Facility: CLINIC | Age: 63
End: 2023-02-17

## 2023-02-28 ENCOUNTER — APPOINTMENT (OUTPATIENT)
Dept: GYNECOLOGIC ONCOLOGY | Facility: CLINIC | Age: 63
End: 2023-02-28
Payer: COMMERCIAL

## 2023-02-28 VITALS
WEIGHT: 150 LBS | HEIGHT: 66.93 IN | SYSTOLIC BLOOD PRESSURE: 135 MMHG | BODY MASS INDEX: 23.54 KG/M2 | HEART RATE: 84 BPM | DIASTOLIC BLOOD PRESSURE: 89 MMHG

## 2023-02-28 DIAGNOSIS — N92.0 EXCESSIVE AND FREQUENT MENSTRUATION WITH REGULAR CYCLE: ICD-10-CM

## 2023-02-28 PROCEDURE — 99213 OFFICE O/P EST LOW 20 MIN: CPT

## 2023-02-28 NOTE — PHYSICAL EXAM
[de-identified] : well-healed [de-identified] : slim spec used; patent to apex, no evidence of residual tumor [de-identified] : cuff mobile, no abnormal induration nor mass [de-identified] : normal sphincter tone, smooth rectovaginal septum, no cul-de-sac nodules.

## 2023-02-28 NOTE — H&P PST ADULT - DOES PATIENT MEET CRITERIA FOR SEPSIS
Per pharmacy, patient's plan prefers Ventolin. Requested Prescriptions     Pending Prescriptions Disp Refills    Ventolin HFA 90 mcg/actuation inhaler 18 g 12     Sig: Take 2 Puffs by inhalation every four (4) hours as needed for Wheezing. For Pharmacy Admin Tracking Only    Program: Medication Refill  CPA in place:   Recommendation Provided To:    Intervention Detail: New Rx: 1, reason: Patient Preference  Intervention Accepted By: Kenia Bauer Closed?:   Time Spent (min): 5
No

## 2023-02-28 NOTE — HISTORY OF PRESENT ILLNESS
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 \par \par She underwent HSC/D&C on 6/10/20 and pathology demonstrated FIGO grade 2 EMC with clear cell features. \par She subsequently underwent robotic TLH/BSO/staging on 6/22/20.\par \par Final pathology demonstrated stage IA non-myoinvasive FIGO grade 2 (on initial path) EMC.Stains confirmed endometrioid adenocarcinoma with focal clear cell features, not clear cell carcinoma. Benign nodes and peritoneal cytology.\par MMR: loss of nuclear expression of MLH1/PMS2; methylation detected, indicative of sporadic mutation.\par \par Preop Markers 6/15/20:  = 14, CEA = 1.6\par 4/16/21:   = 7\par 6/28/21:   = 7\par 1/12/22: =4\par 3/24/22:  = 7\par 7/26/22:  = 6\par 1/26/23: = 7\par \par Preop CT - 6/19/20 - small renal cysts; no findings in need of f/u.\par CT- 1/24/23- stable with small renal cysts no evidence of disease\par \par **She has significant history of severe vulvodynia and resultant refusal of pelvic exams as well as sexual dysfunction.\par \par PMH: Vulvodynia (gabapentin), Anxiety \par PSH: lumpectomy, multiple vulvar surgeries due to vulvodynia (Bartholin gland, hymenal tissue/scar tissue removed) under care of Dr. Jefferson Downs at [facility]. \par Family history of cancer: father- mesothelioma, brother- bladder cancer, maternal grandmother with breast and colon cancer, maternal great aunt with breast, maternal second cousin with breast cancer.\par She had genetic counseling in 8/2020. She did not undergo testing after completing counseling.\par She reports chronic right hip pain.  \par \par She was seen for routine followup cuff check on 11/17/20 and had a normal exam.\par She called office in December to report vaginal spotting, and a new vaginal soft-tissue mass was seen on exam. Biopsy demonstrated clear cell carcinoma. CT C/A/P demonstrated a 3cm vaginal cuff mass and no other evidence of disease\par Tumor board recommendation was for pelvic RT + vaginal brachytherapy.\par She also sought an opinion from Medical Oncology, Dr. Suzy Childress at [facility], who agreed with our treatment recommendation. \par She initiated radiation treatment with Dr. Dickerson and completed EBRT in the last week of February, tolerated well except for vulvar irritation.\par 3/2021- Chest CT IC- negative\par 3/23/21 MRI Pelvis: previously demonstrated patchy cuff lesion measures 1.5 x 1.9 x 1.3\par     cm in size w/ peripheral enhancement and possible central necrosis - a decrease in\par     size from prior CT scan where it measured 3.0 x 3.1 cm \par She received vaginal cuff SBRT (5 doses), completion was the 2nd week of April.\par PET/CT 6/4/21: minimal soft tissue thickening in the region of the left vaginal cuff which is non-FDG avid, unchanged since March 23, 2021 and considerably decreased as compared to CT December 24, 2020. No evidence of FDG avid distant metastatic disease.\par 9/22/21 PET: 1. New moderate right hydronephrosis and right hydroureter to the level of\par     the UVJ, probably secondary to an obstructive right UVJ/distal ureteral calculus. Urologic\par     consultation is suggested. No definite FDG avid soft tissue region of the right\par     vaginal cuff or right pelvis to explain the new right hydronephrosis/right hydroureter.\par     2. Postsurgical changes of hysterectomy and bilateral salpingo-oophorectomy. Fiducial\par     markers in the left vaginal cuff, with unchanged, minimal non-FDG\par     avid soft tissue prominence.\par     3. No scan evidence of FDG avid locoregional or distant metastatic disease.\par \par 12/22/21 CT Abdomen and pelvis: \par Stable appearance of the chest compared with 3/24/2021.\par Interval significant decrease in size of previously noted mass at the left vaginal apex since 12/24/2020, stable size from 9/2021 PET.  Minimal soft tissue prominence is noted.\par Interval resolution of previously noted right hydroureteronephrosis since PET/CT dated 9/22/2021. Previously noted distal right ureteral calculus is no longer seen.\par \par 3/22/22:  CT C/A/P: Stable soft tissue prominence of the left vaginal cuff. No measurable disease.\par No evidence of metastatic disease in the chest\par \par 7/22/22: CT A/P: SHAINA\par 1/24/23 CT A/P:  Stable exam. No evidence of recurrent or metastatic disease\par \par She has an appt with GI for bleeidng from hemorrhoids. She denies any VB or other associated signs or symptoms. Using her dilator weekly. Vaginal dryness. \par \par HM:\par Pap- n/a\par Mammo/ SBE/ CBE- followed by Dr. Sonal Mcdaniel(diffuse cystic mastopathy) - seen 11/2022\par EGD- 10/2016- hiatal hernia\par Colonoscopy/EGD- 10/2016- negative per patient; had f/u 8/2020 which had a benign polyp per patient; has appt\par \par Referred by (GYN) Dr. Kincaid \par PCP: Dr. Enciso\par Med Onc:  Robyn Uribe MD\par Rad Onc:  Katherine Dickerson MD\par Urologist: Dr. Amira Salas ([facility])\par Breast surgeon: Dr. Sonal Mcdaniel

## 2023-02-28 NOTE — LETTER BODY
[FreeTextEntry2] : She completed pelvic radiation therapy for vaginal recurrence and is clinically without evidence of disease. She will be seeing me regularly for followup exams. \par  I will keep you updated on her progress. Thank you again for referring this [facility] patient.\par \par

## 2023-03-21 NOTE — H&P PST ADULT - NSANTHOSAYNRD_GEN_A_CORE
Warm
No. RODRÍGUEZ screening performed.  STOP BANG Legend: 0-2 = LOW Risk; 3-4 = INTERMEDIATE Risk; 5-8 = HIGH Risk

## 2023-05-19 NOTE — PROGRESS NOTE ADULT - PROVIDER SPECIALTY LIST ADULT
Gyn Onc
69-year-old [place] female with HTN,  Alport syndrome and CKD 5  (x 15 years), LUE AVF placed in 2017, recently started HD on 3/17/2023 ([facility], Nephrologist Dr. Jet Horn ) now s/p R  DDRT under Simulect induction on 3/28/23. Patient now here for ureteral stent removal.

## 2023-06-02 ENCOUNTER — RESULT REVIEW (OUTPATIENT)
Age: 63
End: 2023-06-02

## 2023-06-05 ENCOUNTER — APPOINTMENT (OUTPATIENT)
Dept: ORTHOPEDIC SURGERY | Facility: CLINIC | Age: 63
End: 2023-06-05
Payer: COMMERCIAL

## 2023-06-05 DIAGNOSIS — M75.01 ADHESIVE CAPSULITIS OF RIGHT SHOULDER: ICD-10-CM

## 2023-06-05 PROCEDURE — 73030 X-RAY EXAM OF SHOULDER: CPT | Mod: RT

## 2023-06-05 PROCEDURE — 99203 OFFICE O/P NEW LOW 30 MIN: CPT

## 2023-06-05 NOTE — DISCUSSION/SUMMARY
[de-identified] : (Assessment and Plan)\par \par History, clinical examination and imaging were most consistent with:\par -right shoulder adhesive capulitis \par \par The diagnosis was explained in detail. The potential non-surgical and surgical treatments were reviewed. The relative risks and benefits of each option were considered relative to the patient’s age, activity level, medical history, symptom severity and previously attempted treatments. \par \par The patient was advised to consult with their primary medical provider prior to initiation of any new medications to reduce the risk of adverse effects specific to their long-term home medications and medical history. The risk of gastrointestinal irritation and kidney injury specific to long-term NSAID use was discussed.   \par \par -We discussed the prolonged natural history of this condition. \par -Patient will proceed with formal PT.\par -Cortisone injection is discussed and deferred.\par -Topical voltaren and over the counter tylenol as needed for pain. Patient reports history of GI bleeding and will avoid oral NSAIDs. \par -The added clinical utility of an MRI was discussed. The patient deferred further diagnostic testing at this time.\par -Follow up in 6 weeks. Consider CSI at that time. \par \par \par (MDM) \par \par Problem Complexity\par -Moderate: chronic illness with exacerbation\par \par Risk\par -Low: over the counter medication\par \par -Patient has not been seen by another provider in my practice within the past 2 years who specializes in orthopedic sports medicine, shoulder or elbow surgery. \par \par

## 2023-06-05 NOTE — HISTORY OF PRESENT ILLNESS
[de-identified] : (History of Present Illness)\par Patient age in years is 63 \par Occupation is retired \par \par Body part causing symptoms is the RT shoulder\par Symptoms began about 4 months ago, NKI\par Location of pain is anterior\par Quality of pain is  dull \par Pain score at rest is  6/10\par Pain score during activity is 5/10 \par Radicular symptoms are not present \par Prior treatments include heating pad and motrin \par Patient's condition is not associated with worker’s compensation, no-fault or interscholastic athletics

## 2023-06-05 NOTE — IMAGING
[Right] : right shoulder [There are no fractures, subluxations or dislocations. No significant abnormalities are seen] : There are no fractures, subluxations or dislocations. No significant abnormalities are seen [Type 2 acromion] : Type 2 acromion [de-identified] : (Physical Exam: Shoulder)\par \par Laterality is right \par \par Patient is in no acute distress, alert and oriented\par Sensation is grossly intact to light touch in the hand\par Motor function is 5/5 in the hand\par Capillary refill is less than 2 seconds in the fingers\par Lymphadenopathy is not present\par Peripheral edema is not present\par \par Skin is intact \par Swelling is not present \par Atrophy is not present\par Scapular winging is not present\par Deformity of the AC joint is not present\par Deformity of the biceps is not present \par \par Bicipital groove tenderness is not present \par AC joint tenderness is not present \par Scapulothoracic tenderness is not present \par Cervical paraspinal tenderness is not present \par \par Active forward elevation is 120\par Passive forward elevation is 140\par External rotation at the side is 30\par Internal rotation behind the back is to the level of side pocket\par \par Forward elevation strength is 5/5\par External rotation strength at the side is 5/5 \par Internal rotation strength at the side is 5/5 \par Deltoid strength of anterior, posterior and lateral heads is 5/5\par \par Schwartz test is abnormal \par O’Faheem’s test is abnormal\par Speed’s test is normal\par Cross body adduction test is normal\par Apprehension and relocation is normal\par Sulcus sign is normal\par Belly press test is normal\par Spurling’s test is normal\par

## 2023-06-26 ENCOUNTER — OUTPATIENT (OUTPATIENT)
Dept: OUTPATIENT SERVICES | Facility: HOSPITAL | Age: 63
LOS: 1 days | End: 2023-06-26
Payer: COMMERCIAL

## 2023-06-26 ENCOUNTER — APPOINTMENT (OUTPATIENT)
Dept: CT IMAGING | Facility: CLINIC | Age: 63
End: 2023-06-26
Payer: COMMERCIAL

## 2023-06-26 DIAGNOSIS — N90.7 VULVAR CYST: Chronic | ICD-10-CM

## 2023-06-26 DIAGNOSIS — C54.1 MALIGNANT NEOPLASM OF ENDOMETRIUM: ICD-10-CM

## 2023-06-26 DIAGNOSIS — Z00.8 ENCOUNTER FOR OTHER GENERAL EXAMINATION: ICD-10-CM

## 2023-06-26 DIAGNOSIS — Z98.890 OTHER SPECIFIED POSTPROCEDURAL STATES: Chronic | ICD-10-CM

## 2023-06-26 PROCEDURE — 74177 CT ABD & PELVIS W/CONTRAST: CPT

## 2023-06-26 PROCEDURE — 74177 CT ABD & PELVIS W/CONTRAST: CPT | Mod: 26

## 2023-06-28 ENCOUNTER — OUTPATIENT (OUTPATIENT)
Dept: OUTPATIENT SERVICES | Facility: HOSPITAL | Age: 63
LOS: 1 days | Discharge: HOME | End: 2023-06-28

## 2023-06-28 DIAGNOSIS — N90.7 VULVAR CYST: Chronic | ICD-10-CM

## 2023-06-28 DIAGNOSIS — Z98.890 OTHER SPECIFIED POSTPROCEDURAL STATES: Chronic | ICD-10-CM

## 2023-06-28 DIAGNOSIS — N93.9 ABNORMAL UTERINE AND VAGINAL BLEEDING, UNSPECIFIED: ICD-10-CM

## 2023-06-28 LAB
25(OH)D3 SERPL-MCNC: 32 NG/ML
ALBUMIN SERPL ELPH-MCNC: 4.2 G/DL
ALP BLD-CCNC: 89 U/L
ALT SERPL-CCNC: 24 U/L
ANION GAP SERPL CALC-SCNC: 12 MMOL/L
AST SERPL-CCNC: 23 U/L
BILIRUB SERPL-MCNC: 0.6 MG/DL
BUN SERPL-MCNC: 11 MG/DL
CALCIUM SERPL-MCNC: 9.4 MG/DL
CANCER AG125 SERPL-ACNC: 7 U/ML
CHLORIDE SERPL-SCNC: 107 MMOL/L
CO2 SERPL-SCNC: 24 MMOL/L
CREAT SERPL-MCNC: 0.69 MG/DL
EGFR: 98 ML/MIN/1.73M2
GLUCOSE SERPL-MCNC: 89 MG/DL
MAGNESIUM SERPL-MCNC: 2.2 MG/DL
POTASSIUM SERPL-SCNC: 4.8 MMOL/L
PROT SERPL-MCNC: 6.3 G/DL
SODIUM SERPL-SCNC: 142 MMOL/L

## 2023-06-30 NOTE — PHYSICAL EXAM
[Restricted in physically strenuous activity but ambulatory and able to carry out work of a light or sedentary nature] : Status 1- Restricted in physically strenuous activity but ambulatory and able to carry out work of a light or sedentary nature, e.g., light house work, office work [Normal] : affect appropriate 01-Jan-2020

## 2023-07-03 ENCOUNTER — APPOINTMENT (OUTPATIENT)
Dept: HEMATOLOGY ONCOLOGY | Facility: CLINIC | Age: 63
End: 2023-07-03
Payer: COMMERCIAL

## 2023-07-03 VITALS
DIASTOLIC BLOOD PRESSURE: 84 MMHG | OXYGEN SATURATION: 98 % | TEMPERATURE: 98.4 F | SYSTOLIC BLOOD PRESSURE: 146 MMHG | RESPIRATION RATE: 17 BRPM | HEART RATE: 83 BPM | BODY MASS INDEX: 24.56 KG/M2 | WEIGHT: 156.5 LBS

## 2023-07-03 PROCEDURE — 99215 OFFICE O/P EST HI 40 MIN: CPT

## 2023-07-03 NOTE — ASSESSMENT
[FreeTextEntry1] : Most relapses that occur among patients without prior radiation therapy are vaginal recurrences. Data regarding treatment of isolated vaginal relapse are limited, but outcomes appear favorable based on observational studies with RT and the few studies of surgical treatment for this patient population. In the PORTEC-1 trial, among 30 women without prior RT who experienced an isolated vaginal recurrence, the complete response rate to curative-intent treatment was 87 percent.  For most women (28 patients), curative-intent treatment consisted of whole-pelvic RT, with or without brachytherapy, although one of these patients also received surgery, and two were also treated with endocrine therapy.  \par \par For patients with extravaginal extension or pelvic lymph node involvement, the prognosis is worse. In PORTEC-1, only 4 of 10 patients who were treated for pelvic relapse with RT with curative intent reached complete remission.\par There is an ongoing [facility] trial 238, which randomly assigns these patients to RT with or without concurrent sensitizing cisplatin but is still considered investigational.  \par \par For patients treated with curative intent for locoregional recurrence, surveillance is necessary after treatment. Per the NCCN guidelines, CT CAP every six months for the first three years and then every 6 to 12 months for the following two years is recommended.

## 2023-07-03 NOTE — HISTORY OF PRESENT ILLNESS
[Disease: _____________________] : Disease: [unfilled] [de-identified] : The patient was diagnosed with endometrial cancer in June 2020 at the age of 60.  She presented to GYN with postmenopausal bleeding in 12/2019. She had not seen a gynecologist in 27 years and had significant history of severe vulvodynia and resultant refusal of pelvic exams. She noted intermittent postmenopausal spotting for greater than 2 years. Occurred once in 2018, once in 2019 then returned in Jan 2020 almost every day.  2/25/20 Pelvic US:  uterus- left fundal leiomyoma measuring 3.9 x 2.5 x 4.4 cm, and another right-sided leiomyoma measures 2.3 x 2 x 2.7 cm.  3/5/2020- Pelvic MRI:   uterus-  several leiomyomata including 2 pedunculated left- sided leiomyoma measuring 3.2 x and 2.5 cm,  as well as a fundal subserosal leiomyoma measuring 2.8 cm, with an apparent pedunculated 2.5 cm.  No pelvic lymphadenopathy.  D&C 6/12/20:  grade 2 endometrial cancer with clear cell features. A robotic TLH/BSO on 6/22/20 revealed a 3.5 cm endometrioid adenocarcinoma without myometrial invasion and 4 negative pelvic lymph nodes. The pelvic washings were negative for malignant cells. MMR: loss of nuclear expression of MLH1/PMS2; methylation detected, indicative of sporadic mutation. No adjuvant therapy was indicated.  She had a postop checkup due to complaints of vaginal spotting on 12/8/2020. A 3 cm vaginal cuff lesion was noted and biopsy was performed and a friable mass was seen. Pathology showed a clear cell carcinoma.  She was deemed not a surgical candidate and is s/p EBRT with Dr. Katherine Dickerson with plans for brachytherapy as well.    [de-identified] : PMH:  She has significant history of anxiety and severe vulvodynia and resultant refusal of pelvic exams as well as sexual dysfunction.\par PSH: lumpectomy, multiple vulvar surgeries due to vulvodynia (Bartholin gland, hymenal tissue/scar tissue removed) under care of Dr. Jefferson Downs at [place]. \par FH:: father- mesothelioma, brother- bladder cancer, maternal grandmother with breast and colon cancer, maternal great aunt with breast, maternal second cousin with breast cancer.\par HM:  Mammo/ SBE/ CBE- followed by Dr. Briscoe (diffuse cystic mastopathy) - [facility] 9/2020 BIRADS2\par EGD- 10/2016- hiatal hernia\par Colonoscopy/EGD- 10/2016- negative per patient; had f/u 8/2020 which had a benign polyp per patient \par \par  [de-identified] : She has completed EBRT in the last week of February 2021 and tolerated it well, except for vulvar irritation. She had a PET 6/4/2021 which showed minimal soft tissue thickening in the region of the left vaginal cuff which is non-FDG avid, unchanged since March 23, 2021 and considerably decreased as compared to CT December 24, 2020.  No evidence of FDG avid distant metastatic disease.\par \par She has occasional spotting that stopped ~ 2-3 weeks and resumed mid March, with occasional spotting. She denies any other associated signs and symptoms. She reports chronic right hip pain.

## 2023-07-03 NOTE — RESULTS/DATA
[FreeTextEntry1] : 6/26/23 CT A/P: Mild asymmetric enlargement of the left side of the vaginal cuff compared with the right. Please correlate with pelvic examination. MRI or PET scan can be performed for further evaluation as warranted. No evidence of distant metastatic disease.\par \par 1/24/23 CT:  Stable exam. No evidence of recurrent or metastatic disease.\par \par 7/22/22 CT A/P: Status post hysterectomy. No evidence of metastatic disease in the abdomen or pelvis.\par \par 3/22/22 CT C/A/P: Stable soft tissue prominence of the left vaginal cuff. No measurable disease. No evidence of metastatic disease in the chest.\par \par 12/22/21 CT C/A/P: Stable appearance of the chest compared with 3/24/2021. Interval significant decrease in size of previously noted mass at the left vaginal apex since 12/24/2020. Minimal soft tissue prominence is noted in this region as described above. Interval resolution of previously noted right hydroureteronephrosis since PET/CT dated 9/22/2021. Previously noted distal right ureteral calculus is no longer seen.\par \par 9/22/21 PET: 1.  New moderate right hydronephrosis and right hydroureter to the level of the UVJ, probably secondary to an obstructive right UVJ/distal ureteral calculus. Urologic consultation is suggested. No definite FDG avid soft tissue region of the right vaginal cuff or right pelvis to explain the new right hydronephrosis/right hydroureter.\par 2.  Postsurgical changes of hysterectomy and bilateral salpingo-oophorectomy. Fiducial markers in the left vaginal cuff, with unchanged, minimal non-FDG avid soft tissue prominence.\par 3.  No scan evidence of FDG avid locoregional or distant metastatic disease.\par \par 6/22/2021 PET/CT - Minimal soft tissue thickening in the region of the left vaginal cuff which is non-FDG avid, unchanged since March 23, 2021 and considerably decreased as compared to CT December 24, 2020.  No evidence of FDG avid distant metastatic disease.\par \par 3/24/21 CT Chest:  SHAINA\par \par 3/23/21 MRI Pelvis:  Status post hysterectomy previously demonstrated patchy cuff lesion today measures 1.5 x 1.9 x 1.3 cm in size with peripheral enhancement and possible central necrosis. This represents a decrease in size from prior CT scan where it measured 3.0 x 3.1 cm\par \par 12/24/20 CT C/A/P:  3.1 cm solid mass adjacent to the left vaginal apex suspicious for recurrent disease.  No evidence of metastatic disease in the thorax or abdomen.\par \par 12/22/20 path:  Vaginal cuff mass, biopsy:\par  - Clear cell carcinoma.  The tumor cells are focally positive for Napsin, and ER, and negative for TX, supporting the above diagnosis\par \par 6/10/20 Pathology:\par Uterus, cervix, bilateral fallopian tubes and ovaries, total hysterectomy and bilateral salpingo-oophorectomy:  Endometrial endometrioid carcinoma (FIGO I).  Tumor is extending into the adenomyosis foci.  Leiomyomas.  Adenomyosis. Unremarkable cervix.  Unremarkable bilateral ovaries and fallopian tubes\par Lymph node, pelvic sentinel, right, excision: Two lymph nodes negative for tumor (0/2) ( H and E and immunostain AE1-AE3)\par Uterus, lower uterine segment fibroid, removal:  Leiomyoma with degenerative changes\par Fallopian tube,  paratubal cyst, right, removal:  Unremarkable fallopian tube with microcalcifications. Paratubal cysts\par Lymph node, pelvic sentinel, left, excision:  Four  lymph nodes negative for tumor (0/7)( H and E and immunostain AE1-AE3)\par Comment :  Final grading of the tumor is grade II in correlation with patient prior biopsy. Of note, no myometrial invasion is identified, what was seen in the original frozen section slide is best interpreted as involvement / extension of the tumor into the foci of adenomyosis rather than myometrial invasion.\par Synoptic Summary\par Procedure:  Total hysterectomy and bilateral salpingo-oophorectomy\par Specimen Integrity:   Intact hysterectomy\par Tumor Site:   Anterior endometrium and Posterior endometrium\par Tumor Size: 3.5  cm ( greatest dimension)\par Histologic Type:  Endometrioid adenocarcinoma\par Histologic Grade:   FIGO grade II\par Myometrial Invasion:   Not present\par Adenomyosis: present - involved by carcinoma\par Uterine Serosa Involvement:   Not identified\par Lower Uterine Segment Involvement:  Not identified\par Cervical Stromal Involvement:   Not involved\par Other Tissue/Organs involvement:   Not involved\par Peritoneal or Ascitic Fluid:  Please see result ([id number])\par Margins:    Not applicable\par Lymphovascular Invasion:   Not identified\par Regional Lymph Nodes:\par Pelvic Lymph nodes:\par Number of Pelvic Nodes with Macrometastasis (>2mm):  0\par Number of Pelvic Nodes with Micrometastasis (>0.2 mm up to 2mm): 0\par Number of Pelvic Nodes with Isolated Tumor Cells (0.2 mm or less): 0\par Laterality of Pelvic Nodes with Tumor: N/A\par Total Number of Pelvic Nodes Examined (sentinel and non-sentinel): 6\par Number of Pelvic [place] Nodes Examined: 6\par Laterality of Pelvic Nodes Examined: Left and right\par Para-aortic Lymph nodes:  No lymph nodes submitted\par Pathologic Stage Classification (pTNM, AJCC 8th Edition)\par TNM descriptors : N/A\par Primary Tumor (pT):  pT1a\par Regional Lymph Nodes (pN) or pN (sn):   pN0\par Distant Metastasis (pM):  pM0\par FIGO Stage (2015 FIGO Cancer Report):  Ia\par Additional Pathologic Findings:    None\par Clinical History:   None\par Ancillary Studies:  (MMR IHC)\par Immunohistochemistry Testing (IHC) for Mismatch Repair Proteins performed on tissue block 1H shows the following result:\par MLH1 : Loss of nuclear expression\par MSH2 :  Intact nuclear expression\par MSH6:  Intact nuclear expression\par PMS2 : Loss of nuclear expression\par Interpretation: There is a loss of nuclear expression in MLH1 and PMS2, while MSH2 and MSH6 show intact nuclear expression. Loss of nuclear expression of MLH1 and PMS2 is supportive of DNA mismatch repair deficiency and high frequency of microsatellite instability (MSI-H).\par \par 6/10/20 Pathology:  Endocervix curettings - Fragments of endometrial adenocarcinoma morphologically similar to part 2.  Scant benign cervical tissue \par Endometrial curettings - Endometrial endometrioid adenocarcinoma with focal squamous and mucinous differentiation, FIGO grade 2 (see comment)\par Comment: Focal areas of tumor with clear cell morphology are seen admixed with fragments of endometrioid adenocarcinoma.

## 2023-07-05 DIAGNOSIS — C54.1 MALIGNANT NEOPLASM OF ENDOMETRIUM: ICD-10-CM

## 2023-07-05 DIAGNOSIS — F41.9 ANXIETY DISORDER, UNSPECIFIED: ICD-10-CM

## 2023-07-10 ENCOUNTER — OUTPATIENT (OUTPATIENT)
Dept: OUTPATIENT SERVICES | Facility: HOSPITAL | Age: 63
LOS: 1 days | End: 2023-07-10

## 2023-07-10 ENCOUNTER — APPOINTMENT (OUTPATIENT)
Dept: NUCLEAR MEDICINE | Facility: CLINIC | Age: 63
End: 2023-07-10
Payer: COMMERCIAL

## 2023-07-10 DIAGNOSIS — Z00.8 ENCOUNTER FOR OTHER GENERAL EXAMINATION: ICD-10-CM

## 2023-07-10 DIAGNOSIS — N90.7 VULVAR CYST: Chronic | ICD-10-CM

## 2023-07-10 DIAGNOSIS — Z98.890 OTHER SPECIFIED POSTPROCEDURAL STATES: Chronic | ICD-10-CM

## 2023-07-10 PROCEDURE — 78815 PET IMAGE W/CT SKULL-THIGH: CPT | Mod: 26,PS

## 2023-07-11 ENCOUNTER — APPOINTMENT (OUTPATIENT)
Dept: MRI IMAGING | Facility: CLINIC | Age: 63
End: 2023-07-11
Payer: COMMERCIAL

## 2023-07-11 ENCOUNTER — OUTPATIENT (OUTPATIENT)
Dept: OUTPATIENT SERVICES | Facility: HOSPITAL | Age: 63
LOS: 1 days | End: 2023-07-11
Payer: COMMERCIAL

## 2023-07-11 DIAGNOSIS — C54.1 MALIGNANT NEOPLASM OF ENDOMETRIUM: ICD-10-CM

## 2023-07-11 DIAGNOSIS — Z98.890 OTHER SPECIFIED POSTPROCEDURAL STATES: Chronic | ICD-10-CM

## 2023-07-11 DIAGNOSIS — N90.7 VULVAR CYST: Chronic | ICD-10-CM

## 2023-07-11 PROCEDURE — 72197 MRI PELVIS W/O & W/DYE: CPT | Mod: 26

## 2023-07-11 PROCEDURE — A9585: CPT

## 2023-07-11 PROCEDURE — 72197 MRI PELVIS W/O & W/DYE: CPT

## 2023-07-13 ENCOUNTER — APPOINTMENT (OUTPATIENT)
Dept: GYNECOLOGIC ONCOLOGY | Facility: CLINIC | Age: 63
End: 2023-07-13
Payer: COMMERCIAL

## 2023-07-13 VITALS
WEIGHT: 153 LBS | BODY MASS INDEX: 24.59 KG/M2 | HEART RATE: 78 BPM | DIASTOLIC BLOOD PRESSURE: 91 MMHG | HEIGHT: 66 IN | SYSTOLIC BLOOD PRESSURE: 155 MMHG

## 2023-07-13 PROCEDURE — 99213 OFFICE O/P EST LOW 20 MIN: CPT

## 2023-07-13 NOTE — PHYSICAL EXAM
[Chaperone Present] : A chaperone was present in the examining room during all aspects of the physical examination [Absent] : Adnexa(ae): Absent [Normal] : Recto-Vaginal Exam: Normal [Fully active, able to carry on all pre-disease performance without restriction] : Status 0 - Fully active, able to carry on all pre-disease performance without restriction [de-identified] : well-healed [de-identified] : slim spec used; patent to apex, no evidence of tumor; no blood nor abnormal discharge [de-identified] : normal sphincter tone, smooth rectovaginal septum, no cul-de-sac nodules. [de-identified] : cuff mobile, no abnormal induration nor mass

## 2023-07-13 NOTE — HISTORY OF PRESENT ILLNESS
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 \par \par She underwent HSC/D&C on 6/10/20 and pathology demonstrated FIGO grade 2 EMC with clear cell features. \par She subsequently underwent robotic TLH/BSO/staging on 6/22/20.\par \par Final pathology demonstrated stage IA non-myoinvasive FIGO grade 2 (on initial path) EMC.Stains confirmed endometrioid adenocarcinoma with focal clear cell features, not clear cell carcinoma. Benign nodes and peritoneal cytology.\par MMR: loss of nuclear expression of MLH1/PMS2; methylation detected, indicative of sporadic mutation.\par \par Preop Markers 6/15/20:  = 14, CEA = 1.6\par 4/16/21:   = 7\par 6/28/21:   = 7\par 1/12/22: =5\par 3/24/22:  = 7\par 7/26/22:  = 6\par 1/26/23: = 7\par 6/28/23: = 7\par \par Preop CT - 6/19/20 - small renal cysts; no findings in need of f/u.\par CT- 1/24/23- stable with small renal cysts no evidence of disease\par CT- 6/26/23- Mild asymmetric enlargement of the left side of the vaginal cuff compared with the right. Please correlate with pelvic examination. MRI or PET scan can be performed for further evaluation as warranted.\par No evidence of distant metastatic disease.\par 7/10/23- PET/CT- 1.  Asymmetric appearance of the vaginal cuff on the LEFT remains essentially unchanged since 2021 and is again without abnormal activity. 2.  Otherwise, no evidence of FDG avid disease.\par 7/11/23: MRI pelvic- report pending\par \par **She has significant history of severe vulvodynia and resultant refusal of pelvic exams as well as sexual dysfunction.\par \par PMH: Vulvodynia (gabapentin), Anxiety \par PSH: lumpectomy, multiple vulvar surgeries due to vulvodynia (Bartholin gland, hymenal tissue/scar tissue removed) under care of Dr. Jefferson Downs at [place]. \par Family history of cancer: father- mesothelioma, brother- bladder cancer, maternal grandmother with breast and colon cancer, maternal great aunt with breast, maternal second cousin with breast cancer.\par She had genetic counseling in 8/2020. She did not undergo testing after completing counseling.\par She reports chronic right hip pain.  \par \par She was seen for routine followup cuff check on 11/17/20 and had a normal exam.\par She called office in December to report vaginal spotting, and a new vaginal soft-tissue mass was seen on exam. Biopsy demonstrated clear cell carcinoma. CT C/A/P demonstrated a 3cm vaginal cuff mass and no other evidence of disease\par Tumor board recommendation was for pelvic RT + vaginal brachytherapy.\par She also sought an opinion from Medical Oncology, Dr. Suzy Childress at [facility], who agreed with our treatment recommendation. \par She initiated radiation treatment with Dr. Dickerson and completed EBRT in the last week of February, tolerated well except for vulvar irritation.\par \par She denies any VB or other associated signs or symptoms. Using her dilator weekly. Vaginal dryness. \par \par HM:\par Pap- n/a\par Mammo/ SBE/ CBE- followed by Dr. Sonal Mcdaniel(diffuse cystic mastopathy) - seen 11/2022\par EGD- 10/2016- hiatal hernia\par Colonoscopy/EGD- 10/2016- negative per patient; had f/u 8/2020 which had a benign polyp per patient; has appt\par \par Referred by (GYN) Dr. Kincaid \par PCP: Dr. Enciso\par Med Onc:  Robyn Uribe MD\par Rad Onc:  Katherine Dickerson MD\par Urologist: Dr. Amira Salas ([facility])\par Breast surgeon: Dr. Sonal Mcdaniel

## 2023-07-14 ENCOUNTER — NON-APPOINTMENT (OUTPATIENT)
Age: 63
End: 2023-07-14

## 2023-07-17 ENCOUNTER — APPOINTMENT (OUTPATIENT)
Dept: HEMATOLOGY ONCOLOGY | Facility: CLINIC | Age: 63
End: 2023-07-17

## 2023-07-17 VITALS — HEIGHT: 66 IN | WEIGHT: 155.06 LBS | BODY MASS INDEX: 24.92 KG/M2 | RESPIRATION RATE: 18 BRPM

## 2023-07-28 ENCOUNTER — APPOINTMENT (OUTPATIENT)
Dept: HEMATOLOGY ONCOLOGY | Facility: CLINIC | Age: 63
End: 2023-07-28
Payer: COMMERCIAL

## 2023-07-28 PROCEDURE — 99443: CPT

## 2023-07-28 NOTE — HISTORY OF PRESENT ILLNESS
[de-identified] : The patient was diagnosed with endometrial cancer in June 2020 at the age of 60.  She presented to GYN with postmenopausal bleeding in 12/2019. She had not seen a gynecologist in 27 years and had significant history of severe vulvodynia and resultant refusal of pelvic exams. She noted intermittent postmenopausal spotting for greater than 2 years. Occurred once in 2018, once in 2019 then returned in Jan 2020 almost every day.  2/25/20 Pelvic US:  uterus- left fundal leiomyoma measuring 3.9 x 2.5 x 4.4 cm, and another right-sided leiomyoma measures 2.3 x 2 x 2.7 cm.  3/5/2020- Pelvic MRI:   uterus-  several leiomyomata including 2 pedunculated left- sided leiomyoma measuring 3.2 x and 2.5 cm,  as well as a fundal subserosal leiomyoma measuring 2.8 cm, with an apparent pedunculated 2.5 cm.  No pelvic lymphadenopathy.  D&C 6/12/20:  grade 2 endometrial cancer with clear cell features. A robotic TLH/BSO on 6/22/20 revealed a 3.5 cm endometrioid adenocarcinoma without myometrial invasion and 4 negative pelvic lymph nodes. The pelvic washings were negative for malignant cells. MMR: loss of nuclear expression of MLH1/PMS2; methylation detected, indicative of sporadic mutation. No adjuvant therapy was indicated.  She had a postop checkup due to complaints of vaginal spotting on 12/8/2020. A 3 cm vaginal cuff lesion was noted and biopsy was performed and a friable mass was seen. Pathology showed a clear cell carcinoma.  She was deemed not a surgical candidate and is s/p EBRT with Dr. Katherine Dickerson with plans for brachytherapy as well.    [de-identified] : PMH:  She has significant history of anxiety and severe vulvodynia and resultant refusal of pelvic exams as well as sexual dysfunction.\par PSH: lumpectomy, multiple vulvar surgeries due to vulvodynia (Bartholin gland, hymenal tissue/scar tissue removed) under care of Dr. Jefferson Downs at [place]. \par FH:: father- mesothelioma, brother- bladder cancer, maternal grandmother with breast and colon cancer, maternal great aunt with breast, maternal second cousin with breast cancer.\par HM:  Mammo/ SBE/ CBE- followed by Dr. Briscoe (diffuse cystic mastopathy) - [facility] 9/2020 BIRADS2\par EGD- 10/2016- hiatal hernia\par Colonoscopy/EGD- 10/2016- negative per patient; had f/u 8/2020 which had a benign polyp per patient \par \par  [de-identified] : She has completed EBRT in the last week of February 2021 and tolerated it well, except for vulvar irritation. She had a PET 6/4/2021 which showed minimal soft tissue thickening in the region of the left vaginal cuff which is non-FDG avid, unchanged since March 23, 2021 and considerably decreased as compared to CT December 24, 2020.  No evidence of FDG avid distant metastatic disease.\par \par She has occasional spotting that stopped ~ 2-3 weeks and resumed mid March, with occasional spotting. She denies any other associated signs and symptoms. She reports chronic right hip pain.

## 2023-07-28 NOTE — REASON FOR VISIT
[Home] : at home, [unfilled] , at the time of the visit. [Medical Office: ([facility])___] : at the medical office located in  [Verbal consent obtained from patient] : the patient, [unfilled]

## 2023-07-28 NOTE — RESULTS/DATA
[FreeTextEntry1] : 7/11/23 MRI Pelvis: Unremarkable pelvic MRI without radiographic evidence recurrence of the vaginal cuff.\par \par 7/10/23 PET: Asymmetric appearance of the vaginal cuff on the LEFT remains essentially unchanged since 2021 and is again without abnormal activity. Otherwise, no evidence of FDG avid disease.\par \par 6/26/23 CT A/P: Mild asymmetric enlargement of the left side of the vaginal cuff compared with the right. Please correlate with pelvic examination. MRI or PET scan can be performed for further evaluation as warranted. No evidence of distant metastatic disease.\par \par 1/24/23 CT:  Stable exam. No evidence of recurrent or metastatic disease.\par \par 7/22/22 CT A/P: Status post hysterectomy. No evidence of metastatic disease in the abdomen or pelvis.\par \par 3/22/22 CT C/A/P: Stable soft tissue prominence of the left vaginal cuff. No measurable disease. No evidence of metastatic disease in the chest.\par \par 12/22/21 CT C/A/P: Stable appearance of the chest compared with 3/24/2021. Interval significant decrease in size of previously noted mass at the left vaginal apex since 12/24/2020. Minimal soft tissue prominence is noted in this region as described above. Interval resolution of previously noted right hydroureteronephrosis since PET/CT dated 9/22/2021. Previously noted distal right ureteral calculus is no longer seen.\par \par 9/22/21 PET: 1.  New moderate right hydronephrosis and right hydroureter to the level of the UVJ, probably secondary to an obstructive right UVJ/distal ureteral calculus. Urologic consultation is suggested. No definite FDG avid soft tissue region of the right vaginal cuff or right pelvis to explain the new right hydronephrosis/right hydroureter.\par 2.  Postsurgical changes of hysterectomy and bilateral salpingo-oophorectomy. Fiducial markers in the left vaginal cuff, with unchanged, minimal non-FDG avid soft tissue prominence.\par 3.  No scan evidence of FDG avid locoregional or distant metastatic disease.\par \par 6/22/2021 PET/CT - Minimal soft tissue thickening in the region of the left vaginal cuff which is non-FDG avid, unchanged since March 23, 2021 and considerably decreased as compared to CT December 24, 2020.  No evidence of FDG avid distant metastatic disease.\par \par 3/24/21 CT Chest:  SHAINA\par \par 3/23/21 MRI Pelvis:  Status post hysterectomy previously demonstrated patchy cuff lesion today measures 1.5 x 1.9 x 1.3 cm in size with peripheral enhancement and possible central necrosis. This represents a decrease in size from prior CT scan where it measured 3.0 x 3.1 cm\par \par 12/24/20 CT C/A/P:  3.1 cm solid mass adjacent to the left vaginal apex suspicious for recurrent disease.  No evidence of metastatic disease in the thorax or abdomen.\par \par 12/22/20 path:  Vaginal cuff mass, biopsy:\par  - Clear cell carcinoma.  The tumor cells are focally positive for Napsin, and ER, and negative for SD, supporting the above diagnosis\par \par 6/10/20 Pathology:\par Uterus, cervix, bilateral fallopian tubes and ovaries, total hysterectomy and bilateral salpingo-oophorectomy:  Endometrial endometrioid carcinoma (FIGO I).  Tumor is extending into the adenomyosis foci.  Leiomyomas.  Adenomyosis. Unremarkable cervix.  Unremarkable bilateral ovaries and fallopian tubes\par Lymph node, pelvic sentinel, right, excision: Two lymph nodes negative for tumor (0/2) ( H and E and immunostain AE1-AE3)\par Uterus, lower uterine segment fibroid, removal:  Leiomyoma with degenerative changes\par Fallopian tube,  paratubal cyst, right, removal:  Unremarkable fallopian tube with microcalcifications. Paratubal cysts\par Lymph node, pelvic sentinel, left, excision:  Four  lymph nodes negative for tumor (0/7)( H and E and immunostain AE1-AE3)\par Comment :  Final grading of the tumor is grade II in correlation with patient prior biopsy. Of note, no myometrial invasion is identified, what was seen in the original frozen section slide is best interpreted as involvement / extension of the tumor into the foci of adenomyosis rather than myometrial invasion.\par Synoptic Summary\par Procedure:  Total hysterectomy and bilateral salpingo-oophorectomy\par Specimen Integrity:   Intact hysterectomy\par Tumor Site:   Anterior endometrium and Posterior endometrium\par Tumor Size: 3.5  cm ( greatest dimension)\par Histologic Type:  Endometrioid adenocarcinoma\par Histologic Grade:   FIGO grade II\par Myometrial Invasion:   Not present\par Adenomyosis: present - involved by carcinoma\par Uterine Serosa Involvement:   Not identified\par Lower Uterine Segment Involvement:  Not identified\par Cervical Stromal Involvement:   Not involved\par Other Tissue/Organs involvement:   Not involved\par Peritoneal or Ascitic Fluid:  Please see result (60-WU-)\par Margins:    Not applicable\par Lymphovascular Invasion:   Not identified\par Regional Lymph Nodes:\par Pelvic Lymph nodes:\par Number of Pelvic Nodes with Macrometastasis (>2mm):  0\par Number of Pelvic Nodes with Micrometastasis (>0.2 mm up to 2mm): 0\par Number of Pelvic Nodes with Isolated Tumor Cells (0.2 mm or less): 0\par Laterality of Pelvic Nodes with Tumor: N/A\par Total Number of Pelvic Nodes Examined (sentinel and non-sentinel): 6\par Number of Pelvic [place] Nodes Examined: 6\par Laterality of Pelvic Nodes Examined: Left and right\par Para-aortic Lymph nodes:  No lymph nodes submitted\par Pathologic Stage Classification (pTNM, AJCC 8th Edition)\par TNM descriptors : N/A\par Primary Tumor (pT):  pT1a\par Regional Lymph Nodes (pN) or pN (sn):   pN0\par Distant Metastasis (pM):  pM0\par FIGO Stage (2015 FIGO Cancer Report):  Ia\par Additional Pathologic Findings:    None\par Clinical History:   None\par Ancillary Studies:  (MMR IHC)\par Immunohistochemistry Testing (IHC) for Mismatch Repair Proteins performed on tissue block 1H shows the following result:\par MLH1 : Loss of nuclear expression\par MSH2 :  Intact nuclear expression\par MSH6:  Intact nuclear expression\par PMS2 : Loss of nuclear expression\par Interpretation: There is a loss of nuclear expression in MLH1 and PMS2, while MSH2 and MSH6 show intact nuclear expression. Loss of nuclear expression of MLH1 and PMS2 is supportive of DNA mismatch repair deficiency and high frequency of microsatellite instability (MSI-H).\par \par 6/10/20 Pathology:  Endocervix curettings - Fragments of endometrial adenocarcinoma morphologically similar to part 2.  Scant benign cervical tissue \par Endometrial curettings - Endometrial endometrioid adenocarcinoma with focal squamous and mucinous differentiation, FIGO grade 2 (see comment)\par Comment: Focal areas of tumor with clear cell morphology are seen admixed with fragments of endometrioid adenocarcinoma.

## 2023-07-28 NOTE — ASSESSMENT
[FreeTextEntry1] : Most relapses that occur among patients without prior radiation therapy are vaginal recurrences. Data regarding treatment of isolated vaginal relapse are limited, but outcomes appear favorable based on observational studies with RT and the few studies of surgical treatment for this patient population. In the PORTEC-1 trial, among 30 women without prior RT who experienced an isolated vaginal recurrence, the complete response rate to curative-intent treatment was 87 percent.  For most women (28 patients), curative-intent treatment consisted of whole-pelvic RT, with or without brachytherapy, although one of these patients also received surgery, and two were also treated with endocrine therapy.  \par \par For patients with extravaginal extension or pelvic lymph node involvement, the prognosis is worse. In PORTEC-1, only 4 of 10 patients who were treated for pelvic relapse with RT with curative intent reached complete remission.\par There is an ongoing [facility] [place], which randomly assigns these patients to RT with or without concurrent sensitizing cisplatin but is still considered investigational.  \par \par For patients treated with curative intent for locoregional recurrence, surveillance is necessary after treatment. Per the NCCN guidelines, CT CAP every six months for the first three years and then every 6 to 12 months for the following two years is recommended.

## 2023-08-07 ENCOUNTER — OFFICE (OUTPATIENT)
Dept: URBAN - METROPOLITAN AREA CLINIC 12 | Facility: CLINIC | Age: 63
Setting detail: OPHTHALMOLOGY
End: 2023-08-07
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
## 2023-08-07 PROCEDURE — NO SHOW FE NO SHOW FEE: Performed by: OPHTHALMOLOGY

## 2023-08-14 ENCOUNTER — APPOINTMENT (OUTPATIENT)
Dept: ORTHOPEDIC SURGERY | Facility: CLINIC | Age: 63
End: 2023-08-14

## 2023-08-20 ENCOUNTER — NON-APPOINTMENT (OUTPATIENT)
Age: 63
End: 2023-08-20

## 2023-09-16 ENCOUNTER — OFFICE (OUTPATIENT)
Dept: URBAN - METROPOLITAN AREA CLINIC 12 | Facility: CLINIC | Age: 63
Setting detail: OPHTHALMOLOGY
End: 2023-09-16
Payer: COMMERCIAL

## 2023-09-16 DIAGNOSIS — H26.493: ICD-10-CM

## 2023-09-16 DIAGNOSIS — H43.812: ICD-10-CM

## 2023-09-16 DIAGNOSIS — H35.3131: ICD-10-CM

## 2023-09-16 DIAGNOSIS — H35.371: ICD-10-CM

## 2023-09-16 PROCEDURE — 99214 OFFICE O/P EST MOD 30 MIN: CPT | Performed by: OPHTHALMOLOGY

## 2023-09-16 PROCEDURE — 92134 CPTRZ OPH DX IMG PST SGM RTA: CPT | Performed by: OPHTHALMOLOGY

## 2023-09-16 ASSESSMENT — REFRACTION_MANIFEST
OS_ADD: +2.25
OS_AXIS: 0
OD_VA1: 20/25
OS_CYLINDER: SPH
OD_AXIS: 93
OD_AXIS: 115
OS_AXIS: 085
OD_SPHERE: +0.25
OS_SPHERE: +0.75
OD_SPHERE: +1.75
OD_ADD: +2.25
OD_VA1: 20/20-2
OD_CYLINDER: -0.50
OD_CYLINDER: -0.25
OS_SPHERE: +1.25
OS_VA1: 20/20-1
OS_VA1: 20/20
OS_CYLINDER: -0.75

## 2023-09-16 ASSESSMENT — LID EXAM ASSESSMENTS
OD_BLEPHARITIS: RUL 1+
OS_BLEPHARITIS: LUL 1+

## 2023-09-16 ASSESSMENT — SUPERFICIAL PUNCTATE KERATITIS (SPK)
OS_SPK: 1+
OD_SPK: 1+

## 2023-09-16 ASSESSMENT — AXIALLENGTH_DERIVED
OS_AL: 23.4921
OS_AL: 23.4921
OD_AL: 23.8209
OD_AL: 23.8209
OD_AL: 23.287

## 2023-09-16 ASSESSMENT — REFRACTION_AUTOREFRACTION
OD_AXIS: 114
OS_CYLINDER: -0.75
OD_SPHERE: +0.25
OS_AXIS: 086
OS_SPHERE: +0.75
OD_CYLINDER: -0.25

## 2023-09-16 ASSESSMENT — KERATOMETRY
OS_K1POWER_DIOPTERS: 43.25
OD_AXISANGLE_DEGREES: 090
METHOD_AUTO_MANUAL: AUTO
OD_K2POWER_DIOPTERS: 42.75
OS_K2POWER_DIOPTERS: 43.50
OS_AXISANGLE_DEGREES: 019
OD_K1POWER_DIOPTERS: 42.75

## 2023-09-16 ASSESSMENT — REFRACTION_CURRENTRX
OS_VPRISM_DIRECTION: SV
OS_OVR_VA: 20/
OS_SPHERE: +2.50
OD_SPHERE: +2.50
OD_VPRISM_DIRECTION: SV
OD_OVR_VA: 20/

## 2023-09-16 ASSESSMENT — VISUAL ACUITY
OD_BCVA: 20/25
OS_BCVA: 20/25

## 2023-09-16 ASSESSMENT — SPHEQUIV_DERIVED
OD_SPHEQUIV: 1.5
OD_SPHEQUIV: 0.125
OS_SPHEQUIV: 0.375
OS_SPHEQUIV: 0.375
OD_SPHEQUIV: 0.125

## 2023-09-16 ASSESSMENT — TONOMETRY
OD_IOP_MMHG: 17
OS_IOP_MMHG: 16

## 2023-09-16 ASSESSMENT — CONFRONTATIONAL VISUAL FIELD TEST (CVF)
OS_FINDINGS: FULL
OD_FINDINGS: FULL

## 2023-10-13 ENCOUNTER — ASC (OUTPATIENT)
Dept: URBAN - METROPOLITAN AREA SURGERY 8 | Facility: SURGERY | Age: 63
Setting detail: OPHTHALMOLOGY
End: 2023-10-13
Payer: COMMERCIAL

## 2023-10-13 DIAGNOSIS — H26.491: ICD-10-CM

## 2023-10-13 PROCEDURE — 66821 AFTER CATARACT LASER SURGERY: CPT | Mod: RT | Performed by: OPHTHALMOLOGY

## 2023-10-13 ASSESSMENT — REFRACTION_AUTOREFRACTION
OS_AXIS: 086
OD_CYLINDER: -0.25
OD_AXIS: 114
OS_SPHERE: +0.75
OD_SPHERE: +0.25
OS_CYLINDER: -0.75

## 2023-10-13 ASSESSMENT — KERATOMETRY
OS_AXISANGLE_DEGREES: 019
OD_AXISANGLE_DEGREES: 090
OD_K1POWER_DIOPTERS: 42.75
OD_K2POWER_DIOPTERS: 42.75
METHOD_AUTO_MANUAL: AUTO
OS_K2POWER_DIOPTERS: 43.50
OS_K1POWER_DIOPTERS: 43.25

## 2023-10-13 ASSESSMENT — REFRACTION_CURRENTRX
OD_SPHERE: +2.50
OS_VPRISM_DIRECTION: SV
OS_SPHERE: +2.50
OS_OVR_VA: 20/
OD_OVR_VA: 20/
OD_VPRISM_DIRECTION: SV

## 2023-10-13 ASSESSMENT — AXIALLENGTH_DERIVED
OD_AL: 23.8209
OS_AL: 23.4921
OD_AL: 23.287
OD_AL: 23.8209
OS_AL: 23.4921

## 2023-10-13 ASSESSMENT — SPHEQUIV_DERIVED
OD_SPHEQUIV: 0.125
OS_SPHEQUIV: 0.375
OS_SPHEQUIV: 0.375
OD_SPHEQUIV: 0.125
OD_SPHEQUIV: 1.5

## 2023-10-13 ASSESSMENT — REFRACTION_MANIFEST
OD_AXIS: 115
OD_VA1: 20/20-2
OD_SPHERE: +0.25
OD_CYLINDER: -0.25
OS_AXIS: 085
OS_SPHERE: +0.75
OD_VA1: 20/25
OS_AXIS: 0
OD_CYLINDER: -0.50
OS_SPHERE: +1.25
OS_CYLINDER: SPH
OS_VA1: 20/20
OS_ADD: +2.25
OD_SPHERE: +1.75
OD_AXIS: 93
OS_CYLINDER: -0.75
OS_VA1: 20/20-1
OD_ADD: +2.25

## 2023-10-13 ASSESSMENT — VISUAL ACUITY
OD_BCVA: 20/25
OS_BCVA: 20/25

## 2023-10-13 ASSESSMENT — CONFRONTATIONAL VISUAL FIELD TEST (CVF)
OS_FINDINGS: FULL
OD_FINDINGS: FULL

## 2023-10-17 ENCOUNTER — APPOINTMENT (OUTPATIENT)
Dept: GYNECOLOGIC ONCOLOGY | Facility: CLINIC | Age: 63
End: 2023-10-17
Payer: COMMERCIAL

## 2023-10-17 VITALS
HEART RATE: 82 BPM | HEIGHT: 66 IN | DIASTOLIC BLOOD PRESSURE: 93 MMHG | SYSTOLIC BLOOD PRESSURE: 147 MMHG | WEIGHT: 162 LBS | BODY MASS INDEX: 26.03 KG/M2

## 2023-10-17 DIAGNOSIS — R39.89 OTHER SYMPTOMS AND SIGNS INVOLVING THE GENITOURINARY SYSTEM: ICD-10-CM

## 2023-10-17 PROCEDURE — 99213 OFFICE O/P EST LOW 20 MIN: CPT

## 2023-10-17 RX ORDER — DICLOFENAC SODIUM 1% 10 MG/G
1 GEL TOPICAL
Qty: 1 | Refills: 0 | Status: DISCONTINUED | COMMUNITY
Start: 2023-06-05 | End: 2023-10-17

## 2023-11-17 ENCOUNTER — ASC (OUTPATIENT)
Dept: URBAN - METROPOLITAN AREA SURGERY 8 | Facility: SURGERY | Age: 63
Setting detail: OPHTHALMOLOGY
End: 2023-11-17
Payer: COMMERCIAL

## 2023-11-17 DIAGNOSIS — H26.492: ICD-10-CM

## 2023-11-17 PROCEDURE — 66821 AFTER CATARACT LASER SURGERY: CPT | Mod: 79,LT | Performed by: OPHTHALMOLOGY

## 2023-11-17 ASSESSMENT — CONFRONTATIONAL VISUAL FIELD TEST (CVF)
OS_FINDINGS: FULL
OD_FINDINGS: FULL

## 2023-11-18 PROBLEM — H26.492 POSTERIOR CAPSULAR OPACIFICATION;  , LEFT EYE: Status: ACTIVE | Noted: 2023-11-17

## 2023-11-18 ASSESSMENT — REFRACTION_MANIFEST
OS_SPHERE: +1.25
OD_AXIS: 115
OS_VA1: 20/20
OS_CYLINDER: SPH
OD_SPHERE: +0.25
OD_VA1: 20/20-2
OD_ADD: +2.25
OD_CYLINDER: -0.25
OS_AXIS: 085
OD_CYLINDER: -0.50
OS_SPHERE: +0.75
OS_AXIS: 0
OS_ADD: +2.25
OD_SPHERE: +1.75
OD_VA1: 20/25
OS_VA1: 20/20-1
OD_AXIS: 93
OS_CYLINDER: -0.75

## 2023-11-18 ASSESSMENT — REFRACTION_CURRENTRX
OS_OVR_VA: 20/
OS_SPHERE: +2.50
OS_VPRISM_DIRECTION: SV
OD_OVR_VA: 20/
OD_VPRISM_DIRECTION: SV
OD_SPHERE: +2.50

## 2023-11-18 ASSESSMENT — REFRACTION_AUTOREFRACTION
OD_SPHERE: +0.25
OS_CYLINDER: -0.75
OD_CYLINDER: -0.25
OS_SPHERE: +0.75
OD_AXIS: 114
OS_AXIS: 086

## 2023-11-18 ASSESSMENT — SUPERFICIAL PUNCTATE KERATITIS (SPK)
OD_SPK: 1+
OS_SPK: 1+

## 2023-11-18 ASSESSMENT — SPHEQUIV_DERIVED
OD_SPHEQUIV: 1.5
OD_SPHEQUIV: 0.125
OD_SPHEQUIV: 0.125
OS_SPHEQUIV: 0.375
OS_SPHEQUIV: 0.375

## 2023-11-20 ENCOUNTER — APPOINTMENT (OUTPATIENT)
Dept: BREAST CENTER | Facility: CLINIC | Age: 63
End: 2023-11-20
Payer: COMMERCIAL

## 2023-11-20 VITALS
HEIGHT: 67 IN | DIASTOLIC BLOOD PRESSURE: 90 MMHG | BODY MASS INDEX: 25.9 KG/M2 | HEART RATE: 80 BPM | WEIGHT: 165 LBS | SYSTOLIC BLOOD PRESSURE: 136 MMHG

## 2023-11-20 DIAGNOSIS — Z12.39 ENCOUNTER FOR OTHER SCREENING FOR MALIGNANT NEOPLASM OF BREAST: ICD-10-CM

## 2023-11-20 DIAGNOSIS — Z80.3 FAMILY HISTORY OF MALIGNANT NEOPLASM OF BREAST: ICD-10-CM

## 2023-11-20 PROCEDURE — 99214 OFFICE O/P EST MOD 30 MIN: CPT

## 2023-11-21 ENCOUNTER — RESULT REVIEW (OUTPATIENT)
Age: 63
End: 2023-11-21

## 2023-12-07 ENCOUNTER — OFFICE (OUTPATIENT)
Dept: URBAN - METROPOLITAN AREA CLINIC 12 | Facility: CLINIC | Age: 63
Setting detail: OPHTHALMOLOGY
End: 2023-12-07
Payer: COMMERCIAL

## 2023-12-07 DIAGNOSIS — Z96.1: ICD-10-CM

## 2023-12-07 PROCEDURE — 99024 POSTOP FOLLOW-UP VISIT: CPT | Performed by: OPHTHALMOLOGY

## 2023-12-07 ASSESSMENT — REFRACTION_AUTOREFRACTION
OS_AXIS: 079
OD_SPHERE: +0.50
OS_SPHERE: +0.75
OD_AXIS: 105
OS_CYLINDER: -0.75
OD_CYLINDER: -0.25

## 2023-12-07 ASSESSMENT — REFRACTION_MANIFEST
OS_AXIS: 0
OS_AXIS: 085
OD_SPHERE: +1.75
OD_ADD: +2.25
OS_CYLINDER: -0.75
OD_CYLINDER: -0.50
OD_VA1: 20/25
OS_ADD: +2.25
OD_VA1: 20/20-2
OD_CYLINDER: -0.25
OS_VA1: 20/20-1
OS_CYLINDER: SPH
OS_VA1: 20/20
OS_SPHERE: +0.75
OD_SPHERE: +0.25
OD_AXIS: 93
OS_SPHERE: +1.25
OD_AXIS: 115

## 2023-12-07 ASSESSMENT — REFRACTION_CURRENTRX
OD_OVR_VA: 20/
OS_SPHERE: +2.50
OS_OVR_VA: 20/
OD_SPHERE: +2.50
OD_VPRISM_DIRECTION: SV
OS_VPRISM_DIRECTION: SV

## 2023-12-07 ASSESSMENT — LID EXAM ASSESSMENTS
OS_BLEPHARITIS: LUL 1+
OD_BLEPHARITIS: RUL 1+

## 2023-12-07 ASSESSMENT — CONFRONTATIONAL VISUAL FIELD TEST (CVF)
OS_FINDINGS: FULL
OD_FINDINGS: FULL

## 2023-12-07 ASSESSMENT — SUPERFICIAL PUNCTATE KERATITIS (SPK)
OD_SPK: 1+
OS_SPK: 1+

## 2023-12-07 ASSESSMENT — SPHEQUIV_DERIVED
OS_SPHEQUIV: 0.375
OD_SPHEQUIV: 0.125
OD_SPHEQUIV: 0.375
OD_SPHEQUIV: 1.5
OS_SPHEQUIV: 0.375

## 2023-12-19 LAB
ALBUMIN SERPL ELPH-MCNC: 4.4 G/DL
ALP BLD-CCNC: 87 U/L
ALT SERPL-CCNC: 33 U/L
ANION GAP SERPL CALC-SCNC: 10 MMOL/L
AST SERPL-CCNC: 28 U/L
BASOPHILS # BLD AUTO: 0.05 K/UL
BASOPHILS NFR BLD AUTO: 0.8 %
BILIRUB SERPL-MCNC: 0.5 MG/DL
BUN SERPL-MCNC: 13 MG/DL
CALCIUM SERPL-MCNC: 9.3 MG/DL
CANCER AG125 SERPL-ACNC: 8 U/ML
CHLORIDE SERPL-SCNC: 105 MMOL/L
CO2 SERPL-SCNC: 26 MMOL/L
CREAT SERPL-MCNC: 0.67 MG/DL
EGFR: 99 ML/MIN/1.73M2
EOSINOPHIL # BLD AUTO: 0.09 K/UL
EOSINOPHIL NFR BLD AUTO: 1.5 %
GLUCOSE SERPL-MCNC: 83 MG/DL
HCT VFR BLD CALC: 43.8 %
HGB BLD-MCNC: 14.4 G/DL
IMM GRANULOCYTES NFR BLD AUTO: 0.3 %
LYMPHOCYTES # BLD AUTO: 1.22 K/UL
LYMPHOCYTES NFR BLD AUTO: 20.5 %
MAN DIFF?: NORMAL
MCHC RBC-ENTMCNC: 31.1 PG
MCHC RBC-ENTMCNC: 32.9 GM/DL
MCV RBC AUTO: 94.6 FL
MONOCYTES # BLD AUTO: 0.49 K/UL
MONOCYTES NFR BLD AUTO: 8.2 %
NEUTROPHILS # BLD AUTO: 4.08 K/UL
NEUTROPHILS NFR BLD AUTO: 68.7 %
PLATELET # BLD AUTO: 365 K/UL
POTASSIUM SERPL-SCNC: 4.8 MMOL/L
PROT SERPL-MCNC: 6.2 G/DL
RBC # BLD: 4.63 M/UL
RBC # FLD: 12.9 %
SODIUM SERPL-SCNC: 141 MMOL/L
WBC # FLD AUTO: 5.95 K/UL

## 2023-12-21 ENCOUNTER — OUTPATIENT (OUTPATIENT)
Dept: OUTPATIENT SERVICES | Facility: HOSPITAL | Age: 63
LOS: 1 days | End: 2023-12-21
Payer: COMMERCIAL

## 2023-12-21 ENCOUNTER — APPOINTMENT (OUTPATIENT)
Dept: CT IMAGING | Facility: CLINIC | Age: 63
End: 2023-12-21
Payer: COMMERCIAL

## 2023-12-21 DIAGNOSIS — Z00.8 ENCOUNTER FOR OTHER GENERAL EXAMINATION: ICD-10-CM

## 2023-12-21 DIAGNOSIS — C54.1 MALIGNANT NEOPLASM OF ENDOMETRIUM: ICD-10-CM

## 2023-12-21 DIAGNOSIS — N90.7 VULVAR CYST: Chronic | ICD-10-CM

## 2023-12-21 DIAGNOSIS — Z98.890 OTHER SPECIFIED POSTPROCEDURAL STATES: Chronic | ICD-10-CM

## 2023-12-21 PROCEDURE — 74177 CT ABD & PELVIS W/CONTRAST: CPT | Mod: 26

## 2023-12-21 PROCEDURE — 74177 CT ABD & PELVIS W/CONTRAST: CPT

## 2023-12-26 ENCOUNTER — OUTPATIENT (OUTPATIENT)
Dept: OUTPATIENT SERVICES | Facility: HOSPITAL | Age: 63
LOS: 1 days | Discharge: HOME | End: 2023-12-26

## 2023-12-26 DIAGNOSIS — N90.7 VULVAR CYST: Chronic | ICD-10-CM

## 2023-12-26 DIAGNOSIS — N93.9 ABNORMAL UTERINE AND VAGINAL BLEEDING, UNSPECIFIED: ICD-10-CM

## 2023-12-26 DIAGNOSIS — Z98.890 OTHER SPECIFIED POSTPROCEDURAL STATES: Chronic | ICD-10-CM

## 2023-12-26 NOTE — REASON FOR VISIT
[Follow-Up Visit] : a follow-up [Home] : at home, [unfilled] , at the time of the visit. [Medical Office: ([facility])___] : at the medical office located in  [Verbal consent obtained from patient] : the patient, [unfilled]

## 2023-12-27 ENCOUNTER — APPOINTMENT (OUTPATIENT)
Dept: HEMATOLOGY ONCOLOGY | Facility: CLINIC | Age: 63
End: 2023-12-27
Payer: COMMERCIAL

## 2023-12-27 VITALS
SYSTOLIC BLOOD PRESSURE: 149 MMHG | DIASTOLIC BLOOD PRESSURE: 90 MMHG | RESPIRATION RATE: 18 BRPM | HEIGHT: 67 IN | HEART RATE: 91 BPM | WEIGHT: 166 LBS | BODY MASS INDEX: 26.06 KG/M2 | TEMPERATURE: 98.1 F | OXYGEN SATURATION: 97 %

## 2023-12-27 PROCEDURE — 99213 OFFICE O/P EST LOW 20 MIN: CPT

## 2023-12-27 NOTE — ASSESSMENT
[FreeTextEntry1] : Most relapses that occur among patients without prior radiation therapy are vaginal recurrences. Data regarding treatment of isolated vaginal relapse are limited, but outcomes appear favorable based on observational studies with RT and the few studies of surgical treatment for this patient population. In the PORTEC-1 trial, among 30 women without prior RT who experienced an isolated vaginal recurrence, the complete response rate to curative-intent treatment was 87 percent. For most women (28 patients), curative-intent treatment consisted of whole-pelvic RT, with or without brachytherapy, although one of these patients also received surgery, and two were also treated with endocrine therapy.    For patients with extravaginal extension or pelvic lymph node involvement, the prognosis is worse. In PORTEC-1, only 4 of 10 patients who were treated for pelvic relapse with RT with curative intent reached complete remission.  There is an ongoing [facility] [place], which randomly assigns these patients to RT with or without concurrent sensitizing cisplatin but is still considered investigational.    For patients treated with curative intent for locoregional recurrence, surveillance is necessary after treatment. Per the NCCN guidelines, CT CAP every six months for the first three years and then every 6 to 12 months for the following two years is recommended.

## 2023-12-27 NOTE — RESULTS/DATA
[FreeTextEntry1] : 12/21/23 CT A/P: Interval stability. No CT evidence of local recurrence or metastatic disease.  7/11/23 MRI Pelvis: Unremarkable pelvic MRI without radiographic evidence recurrence of the vaginal cuff.  7/10/23 PET: Asymmetric appearance of the vaginal cuff on the LEFT remains essentially unchanged since 2021 and is again without abnormal activity. Otherwise, no evidence of FDG avid disease.  6/26/23 CT A/P: Mild asymmetric enlargement of the left side of the vaginal cuff compared with the right. Please correlate with pelvic examination. MRI or PET scan can be performed for further evaluation as warranted. No evidence of distant metastatic disease.  1/24/23 CT:  Stable exam. No evidence of recurrent or metastatic disease.  7/22/22 CT A/P: Status post hysterectomy. No evidence of metastatic disease in the abdomen or pelvis.  3/22/22 CT C/A/P: Stable soft tissue prominence of the left vaginal cuff. No measurable disease. No evidence of metastatic disease in the chest.  12/22/21 CT C/A/P: Stable appearance of the chest compared with 3/24/2021. Interval significant decrease in size of previously noted mass at the left vaginal apex since 12/24/2020. Minimal soft tissue prominence is noted in this region as described above. Interval resolution of previously noted right hydroureteronephrosis since PET/CT dated 9/22/2021. Previously noted distal right ureteral calculus is no longer seen.  9/22/21 PET: 1.  New moderate right hydronephrosis and right hydroureter to the level of the UVJ, probably secondary to an obstructive right UVJ/distal ureteral calculus. Urologic consultation is suggested. No definite FDG avid soft tissue region of the right vaginal cuff or right pelvis to explain the new right hydronephrosis/right hydroureter. 2.  Postsurgical changes of hysterectomy and bilateral salpingo-oophorectomy. Fiducial markers in the left vaginal cuff, with unchanged, minimal non-FDG avid soft tissue prominence. 3.  No scan evidence of FDG avid locoregional or distant metastatic disease.  6/22/2021 PET/CT - Minimal soft tissue thickening in the region of the left vaginal cuff which is non-FDG avid, unchanged since March 23, 2021 and considerably decreased as compared to CT December 24, 2020.  No evidence of FDG avid distant metastatic disease.  3/24/21 CT Chest:  SHAINA  3/23/21 MRI Pelvis:  Status post hysterectomy previously demonstrated patchy cuff lesion today measures 1.5 x 1.9 x 1.3 cm in size with peripheral enhancement and possible central necrosis. This represents a decrease in size from prior CT scan where it measured 3.0 x 3.1 cm  12/24/20 CT C/A/P:  3.1 cm solid mass adjacent to the left vaginal apex suspicious for recurrent disease.  No evidence of metastatic disease in the thorax or abdomen.  12/22/20 path:  Vaginal cuff mass, biopsy:  - Clear cell carcinoma.  The tumor cells are focally positive for Napsin, and ER, and negative for MI, supporting the above diagnosis  6/10/20 Pathology: Uterus, cervix, bilateral fallopian tubes and ovaries, total hysterectomy and bilateral salpingo-oophorectomy:  Endometrial endometrioid carcinoma (FIGO I).  Tumor is extending into the adenomyosis foci.  Leiomyomas.  Adenomyosis. Unremarkable cervix.  Unremarkable bilateral ovaries and fallopian tubes Lymph node, pelvic sentinel, right, excision: Two lymph nodes negative for tumor (0/2) ( H and E and immunostain AE1-AE3) Uterus, lower uterine segment fibroid, removal:  Leiomyoma with degenerative changes Fallopian tube,  paratubal cyst, right, removal:  Unremarkable fallopian tube with microcalcifications. Paratubal cysts Lymph node, pelvic sentinel, left, excision:  Four  lymph nodes negative for tumor (0/7)( H and E and immunostain AE1-AE3) Comment :  Final grading of the tumor is grade II in correlation with patient prior biopsy. Of note, no myometrial invasion is identified, what was seen in the original frozen section slide is best interpreted as involvement / extension of the tumor into the foci of adenomyosis rather than myometrial invasion. Synoptic Summary Procedure:  Total hysterectomy and bilateral salpingo-oophorectomy Specimen Integrity:   Intact hysterectomy Tumor Site:   Anterior endometrium and Posterior endometrium Tumor Size: 3.5  cm ( greatest dimension) Histologic Type:  Endometrioid adenocarcinoma Histologic Grade:   FIGO grade II Myometrial Invasion:   Not present Adenomyosis: present - involved by carcinoma Uterine Serosa Involvement:   Not identified Lower Uterine Segment Involvement:  Not identified Cervical Stromal Involvement:   Not involved Other Tissue/Organs involvement:   Not involved Peritoneal or Ascitic Fluid:  Please see result (96-YI-) Margins:    Not applicable Lymphovascular Invasion:   Not identified Regional Lymph Nodes: Pelvic Lymph nodes: Number of Pelvic Nodes with Macrometastasis (>2mm):  0 Number of Pelvic Nodes with Micrometastasis (>0.2 mm up to 2mm): 0 Number of Pelvic Nodes with Isolated Tumor Cells (0.2 mm or less): 0 Laterality of Pelvic Nodes with Tumor: N/A Total Number of Pelvic Nodes Examined (sentinel and non-sentinel): 6 Number of Pelvic [place] Nodes Examined: 6 Laterality of Pelvic Nodes Examined: Left and right Para-aortic Lymph nodes:  No lymph nodes submitted Pathologic Stage Classification (pTNM, AJCC 8th Edition) TNM descriptors : N/A Primary Tumor (pT):  pT1a Regional Lymph Nodes (pN) or pN (sn):   pN0 Distant Metastasis (pM):  pM0 FIGO Stage (2015 FIGO Cancer Report):  Ia Additional Pathologic Findings:    None Clinical History:   None Ancillary Studies:  (MMR IHC) Immunohistochemistry Testing (IHC) for Mismatch Repair Proteins performed on tissue block 1H shows the following result: MLH1 : Loss of nuclear expression MSH2 :  Intact nuclear expression MSH6:  Intact nuclear expression PMS2 : Loss of nuclear expression Interpretation: There is a loss of nuclear expression in MLH1 and PMS2, while MSH2 and MSH6 show intact nuclear expression. Loss of nuclear expression of MLH1 and PMS2 is supportive of DNA mismatch repair deficiency and high frequency of microsatellite instability (MSI-H).  6/10/20 Pathology:  Endocervix curettings - Fragments of endometrial adenocarcinoma morphologically similar to part 2.  Scant benign cervical tissue  Endometrial curettings - Endometrial endometrioid adenocarcinoma with focal squamous and mucinous differentiation, FIGO grade 2 (see comment) Comment: Focal areas of tumor with clear cell morphology are seen admixed with fragments of endometrioid adenocarcinoma.

## 2023-12-27 NOTE — HISTORY OF PRESENT ILLNESS
[Disease: _____________________] : Disease: [unfilled] [de-identified] : The patient was diagnosed with endometrial cancer in June 2020 at the age of 60.  She presented to GYN with postmenopausal bleeding in 12/2019. She had not seen a gynecologist in 27 years and had significant history of severe vulvodynia and resultant refusal of pelvic exams. She noted intermittent postmenopausal spotting for greater than 2 years. Occurred once in 2018, once in 2019 then returned in Jan 2020 almost every day.  2/25/20 Pelvic US:  uterus- left fundal leiomyoma measuring 3.9 x 2.5 x 4.4 cm, and another right-sided leiomyoma measures 2.3 x 2 x 2.7 cm.  3/5/2020- Pelvic MRI:   uterus-  several leiomyomata including 2 pedunculated left- sided leiomyoma measuring 3.2 x and 2.5 cm,  as well as a fundal subserosal leiomyoma measuring 2.8 cm, with an apparent pedunculated 2.5 cm.  No pelvic lymphadenopathy.  D&C 6/12/20:  grade 2 endometrial cancer with clear cell features. A robotic TLH/BSO on 6/22/20 revealed a 3.5 cm endometrioid adenocarcinoma without myometrial invasion and 4 negative pelvic lymph nodes. The pelvic washings were negative for malignant cells. MMR: loss of nuclear expression of MLH1/PMS2; methylation detected, indicative of sporadic mutation. No adjuvant therapy was indicated.  She had a postop checkup due to complaints of vaginal spotting on 12/8/2020. A 3 cm vaginal cuff lesion was noted and biopsy was performed and a friable mass was seen. Pathology showed a clear cell carcinoma.  She was deemed not a surgical candidate and is s/p EBRT with Dr. Katherine Dickerson with plans for brachytherapy as well.    [de-identified] : PMH:  She has significant history of anxiety and severe vulvodynia and resultant refusal of pelvic exams as well as sexual dysfunction.\par  PSH: lumpectomy, multiple vulvar surgeries due to vulvodynia (Bartholin gland, hymenal tissue/scar tissue removed) under care of Dr. Jefferson Downs at [place]. \par  FH:: father- mesothelioma, brother- bladder cancer, maternal grandmother with breast and colon cancer, maternal great aunt with breast, maternal second cousin with breast cancer.\par  HM:  Mammo/ SBE/ CBE- followed by Dr. Briscoe (diffuse cystic mastopathy) - [facility] 9/2020 BIRADS2\par  EGD- 10/2016- hiatal hernia\par  Colonoscopy/EGD- 10/2016- negative per patient; had f/u 8/2020 which had a benign polyp per patient \par  \par   [de-identified] : She has completed EBRT in the last week of February 2021 and tolerated it well, except for vulvar irritation. She had a PET 6/4/2021 which showed minimal soft tissue thickening in the region of the left vaginal cuff which is non-FDG avid, unchanged since March 23, 2021 and considerably decreased as compared to CT December 24, 2020.  No evidence of FDG avid distant metastatic disease.\par  \par  She has occasional spotting that stopped ~ 2-3 weeks and resumed mid March, with occasional spotting. She denies any other associated signs and symptoms. She reports chronic right hip pain.

## 2023-12-29 DIAGNOSIS — F41.9 ANXIETY DISORDER, UNSPECIFIED: ICD-10-CM

## 2023-12-29 DIAGNOSIS — C54.1 MALIGNANT NEOPLASM OF ENDOMETRIUM: ICD-10-CM

## 2024-05-06 NOTE — REVIEW OF SYSTEMS
PROCEDURE NOTE: Laser for Retinal Tear OS. Diagnosis: Unspecified Retinal Break. Anesthesia: Topical. Prior to laser, risks/benefits/alternatives to laser discussed including loss of vision, decreased peripheral and night vision, need for more laser and/or surgery and patient wished to proceed. An informed consent was obtained and no assurances or guarantees were given. Spot size: 200* um. Pulse power: 190* mW. Pulse duration: 100* ms. Number of pulses: 112*. Procedure Time: 5PM*. Patient tolerated procedure well. There were no complications. Post-op instructions given. Patient given office phone number/answering service number and advised to call immediately should there be loss of vision or pain, or should they have any other questions or concerns. Jesus Reyes
[Negative] : Musculoskeletal

## 2024-05-07 ENCOUNTER — APPOINTMENT (OUTPATIENT)
Dept: GYNECOLOGIC ONCOLOGY | Facility: CLINIC | Age: 64
End: 2024-05-07
Payer: COMMERCIAL

## 2024-05-07 VITALS
HEIGHT: 67 IN | HEART RATE: 91 BPM | WEIGHT: 166 LBS | DIASTOLIC BLOOD PRESSURE: 81 MMHG | SYSTOLIC BLOOD PRESSURE: 155 MMHG | BODY MASS INDEX: 26.06 KG/M2

## 2024-05-07 DIAGNOSIS — N94.819 VULVODYNIA, UNSPECIFIED: ICD-10-CM

## 2024-05-07 PROCEDURE — 99213 OFFICE O/P EST LOW 20 MIN: CPT

## 2024-05-07 NOTE — DISCUSSION/SUMMARY
[FreeTextEntry1] : - reviewed interval reports  - exam discussed - Risk of recurrence and s/sx reviewed.  - HM reviewed; she has GI and breast f/u - she is advised to see me in 6 months for her next exam.  - imaging per Dr. Uribe, plan is CT in June - Continued dilator therapy encouraged; f/u with Dr. Dickerson. - All questions were answered to her apparent satisfaction.

## 2024-05-07 NOTE — LETTER BODY
[Dear  ___] : Dear  [unfilled], [I recently saw our patient [unfilled] for a follow-up visit.] : I recently saw our patient, [unfilled] for a follow-up visit. [Attached please find my note.] : Attached please find my note. [FreeTextEntry2] : She completed pelvic radiation therapy for vaginal recurrence and is clinically without evidence of disease. She will be seeing me regularly for followup exams.   I will keep you updated on her progress.

## 2024-05-07 NOTE — ASSESSMENT
[FreeTextEntry1] : 62 y/o s/p robotic TLH/BSO/staging for stage IA FIGO grade 2 mixed EMC, with recurrence of clear cell carcinoma at vaginal cuff. Completed pelvic RT, complete clinical response.

## 2024-05-07 NOTE — PHYSICAL EXAM
[Chaperone Present] : A chaperone was present in the examining room during all aspects of the physical examination [Absent] : Adnexa(ae): Absent [Normal] : Recto-Vaginal Exam: Normal [Fully active, able to carry on all pre-disease performance without restriction] : Status 0 - Fully active, able to carry on all pre-disease performance without restriction [de-identified] : slim spec used; patent to apex, no evidence of tumor; no blood nor abnormal discharge [de-identified] : well-healed [de-identified] : cuff mobile, no abnormal induration nor mass [de-identified] : normal sphincter tone, smooth rectovaginal septum, no cul-de-sac nodules.

## 2024-05-07 NOTE — HISTORY OF PRESENT ILLNESS
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   She underwent HSC/D&C on 6/10/20 and pathology demonstrated FIGO grade 2 EMC with clear cell features.  She subsequently underwent robotic TLH/BSO/staging on 6/22/20.  Final pathology demonstrated stage IA non-myoinvasive FIGO grade 2 (on initial path) EMC.Stains confirmed endometrioid adenocarcinoma with focal clear cell features, not clear cell carcinoma. Benign nodes and peritoneal cytology. MMR: loss of nuclear expression of MLH1/PMS2; methylation detected, indicative of sporadic mutation.  She was seen for routine followup cuff check on 11/17/20 and had a normal exam. She called office in December to report vaginal spotting, and a new vaginal soft-tissue mass was seen on exam. Biopsy demonstrated clear cell carcinoma. CT C/A/P demonstrated a 3cm vaginal cuff mass and no other evidence of disease Tumor board recommendation was for pelvic RT + vaginal brachytherapy. She also sought an opinion from Medical Oncology, Dr. Suzy Childress at [facility], who agreed with our treatment recommendation.  She initiated radiation treatment with Dr. Dickerson and completed EBRT in the last week of February 2021, tolerated well except for vulvar irritation.  Preop Markers 6/15/20:  = 14, CEA = 1.6 4/16/21:   = 7 6/28/21:   = 7 1/12/22: =5 3/24/22:  = 7 7/26/22:  = 6 1/26/23: = 7 6/28/23: = 7 12/2023: = 8  Preop CT - 6/19/20 - small renal cysts; no findings in need of f/u. CT- 1/24/23- stable with small renal cysts no evidence of disease CT- 6/26/23- Mild asymmetric enlargement of the left side of the vaginal cuff compared with the right. Please correlate with pelvic examination. MRI or PET scan can be performed for further evaluation as warranted. No evidence of distant metastatic disease. 7/10/23- PET/CT- 1.  Asymmetric appearance of the vaginal cuff on the LEFT remains essentially unchanged since 2021 and is again without abnormal activity. 2.  Otherwise, no evidence of FDG avid disease. 7/11/23: MRI pelvis: Unremarkable pelvic MRI without radiographic evidence recurrence of the vaginal cuff 12/2023- CT - SHAINA Interval stability.  **She has significant history of severe vulvodynia and resultant refusal of pelvic exams as well as sexual dysfunction.  PMH: Vulvodynia (gabapentin), Anxiety  PSH: lumpectomy, multiple vulvar surgeries due to vulvodynia (Bartholin gland, hymenal tissue/scar tissue removed) under care of Dr. Jefferson Downs at [place].  Family history of cancer: father- mesothelioma, brother- bladder cancer, maternal grandmother with breast and colon cancer, maternal great aunt with breast, maternal second cousin with breast cancer. She had genetic counseling in 8/2020. She did not undergo testing after completing counseling. She reports chronic right hip pain.    She denies any VB or other associated signs or symptoms. Using her dilator weekly. Vaginal dryness.   HM: Pap- n/a Mammo/ SBE/ CBE- followed by Dr. Sonal Mcdaniel(diffuse cystic mastopathy) - 11/2023 EGD- 10/2016- hiatal hernia Colonoscopy 10/2016- negative per patient; had f/u 8/2020 which had a benign polyp; has had RT-related rectal bleeding treated with cauterization, per patient; sees GI  Referred by (GYN) Dr. Kincaid , no longer sees PCP: Dr. Enciso Med Onc:  Robyn Uribe MD Rad Onc:  Katherine Dickerson MD Urologist: Dr. Amira Salas ([facility]) Breast surgeon: Dr. Sonal Mcdaniel

## 2024-05-21 ENCOUNTER — NON-APPOINTMENT (OUTPATIENT)
Age: 64
End: 2024-05-21

## 2024-05-29 ENCOUNTER — RESULT REVIEW (OUTPATIENT)
Age: 64
End: 2024-05-29

## 2024-06-12 ENCOUNTER — OFFICE (OUTPATIENT)
Dept: URBAN - METROPOLITAN AREA CLINIC 88 | Facility: CLINIC | Age: 64
Setting detail: OPHTHALMOLOGY
End: 2024-06-12
Payer: COMMERCIAL

## 2024-06-12 DIAGNOSIS — H35.3131: ICD-10-CM

## 2024-06-12 DIAGNOSIS — H35.371: ICD-10-CM

## 2024-06-12 DIAGNOSIS — H43.813: ICD-10-CM

## 2024-06-12 PROCEDURE — 92202 OPSCPY EXTND ON/MAC DRAW: CPT | Performed by: OPHTHALMOLOGY

## 2024-06-12 PROCEDURE — 92134 CPTRZ OPH DX IMG PST SGM RTA: CPT | Performed by: OPHTHALMOLOGY

## 2024-06-12 PROCEDURE — 92014 COMPRE OPH EXAM EST PT 1/>: CPT | Performed by: OPHTHALMOLOGY

## 2024-06-12 ASSESSMENT — LID EXAM ASSESSMENTS
OS_BLEPHARITIS: LUL 1+
OD_BLEPHARITIS: RUL 1+

## 2024-06-12 ASSESSMENT — CONFRONTATIONAL VISUAL FIELD TEST (CVF)
OS_FINDINGS: FULL
OD_FINDINGS: FULL

## 2024-06-19 ENCOUNTER — OUTPATIENT (OUTPATIENT)
Dept: OUTPATIENT SERVICES | Facility: HOSPITAL | Age: 64
LOS: 1 days | End: 2024-06-19
Payer: COMMERCIAL

## 2024-06-19 ENCOUNTER — APPOINTMENT (OUTPATIENT)
Dept: CT IMAGING | Facility: CLINIC | Age: 64
End: 2024-06-19
Payer: COMMERCIAL

## 2024-06-19 DIAGNOSIS — Z98.890 OTHER SPECIFIED POSTPROCEDURAL STATES: Chronic | ICD-10-CM

## 2024-06-19 DIAGNOSIS — C54.1 MALIGNANT NEOPLASM OF ENDOMETRIUM: ICD-10-CM

## 2024-06-19 DIAGNOSIS — N90.7 VULVAR CYST: Chronic | ICD-10-CM

## 2024-06-19 PROCEDURE — 74177 CT ABD & PELVIS W/CONTRAST: CPT

## 2024-06-19 PROCEDURE — 74177 CT ABD & PELVIS W/CONTRAST: CPT | Mod: 26

## 2024-06-21 ENCOUNTER — APPOINTMENT (OUTPATIENT)
Dept: FAMILY MEDICINE | Facility: CLINIC | Age: 64
End: 2024-06-21
Payer: COMMERCIAL

## 2024-06-21 DIAGNOSIS — U07.1 COVID-19: ICD-10-CM

## 2024-06-21 PROCEDURE — 99442: CPT

## 2024-06-21 RX ORDER — NIRMATRELVIR AND RITONAVIR 300-100 MG
20 X 150 MG & KIT ORAL
Qty: 1 | Refills: 0 | Status: ACTIVE | COMMUNITY
Start: 2024-06-21 | End: 1900-01-01

## 2024-06-26 ENCOUNTER — APPOINTMENT (OUTPATIENT)
Dept: HEMATOLOGY ONCOLOGY | Facility: CLINIC | Age: 64
End: 2024-06-26
Payer: COMMERCIAL

## 2024-06-26 DIAGNOSIS — C54.1 MALIGNANT NEOPLASM OF ENDOMETRIUM: ICD-10-CM

## 2024-06-26 DIAGNOSIS — F41.9 ANXIETY DISORDER, UNSPECIFIED: ICD-10-CM

## 2024-06-26 PROCEDURE — G2211 COMPLEX E/M VISIT ADD ON: CPT | Mod: NC

## 2024-06-26 PROCEDURE — 99443: CPT

## 2024-08-05 ENCOUNTER — APPOINTMENT (OUTPATIENT)
Dept: HEMATOLOGY ONCOLOGY | Facility: CLINIC | Age: 64
End: 2024-08-05

## 2024-08-05 PROCEDURE — G2211 COMPLEX E/M VISIT ADD ON: CPT

## 2024-08-05 PROCEDURE — 99213 OFFICE O/P EST LOW 20 MIN: CPT

## 2024-08-05 NOTE — RESULTS/DATA
[FreeTextEntry1] : 6/19/24 CT: No imaging evidence of locally recurrent or metastatic disease involving the abdomen or pelvis.  12/21/23 CT A/P: Interval stability. No CT evidence of local recurrence or metastatic disease.  7/11/23 MRI Pelvis: Unremarkable pelvic MRI without radiographic evidence recurrence of the vaginal cuff.  7/10/23 PET: Asymmetric appearance of the vaginal cuff on the LEFT remains essentially unchanged since 2021 and is again without abnormal activity. Otherwise, no evidence of FDG avid disease.  6/26/23 CT A/P: Mild asymmetric enlargement of the left side of the vaginal cuff compared with the right. Please correlate with pelvic examination. MRI or PET scan can be performed for further evaluation as warranted. No evidence of distant metastatic disease.  1/24/23 CT:  Stable exam. No evidence of recurrent or metastatic disease.  7/22/22 CT A/P: Status post hysterectomy. No evidence of metastatic disease in the abdomen or pelvis.  3/22/22 CT C/A/P: Stable soft tissue prominence of the left vaginal cuff. No measurable disease. No evidence of metastatic disease in the chest.  12/22/21 CT C/A/P: Stable appearance of the chest compared with 3/24/2021. Interval significant decrease in size of previously noted mass at the left vaginal apex since 12/24/2020. Minimal soft tissue prominence is noted in this region as described above. Interval resolution of previously noted right hydroureteronephrosis since PET/CT dated 9/22/2021. Previously noted distal right ureteral calculus is no longer seen.  9/22/21 PET: 1.  New moderate right hydronephrosis and right hydroureter to the level of the UVJ, probably secondary to an obstructive right UVJ/distal ureteral calculus. Urologic consultation is suggested. No definite FDG avid soft tissue region of the right vaginal cuff or right pelvis to explain the new right hydronephrosis/right hydroureter. 2.  Postsurgical changes of hysterectomy and bilateral salpingo-oophorectomy. Fiducial markers in the left vaginal cuff, with unchanged, minimal non-FDG avid soft tissue prominence. 3.  No scan evidence of FDG avid locoregional or distant metastatic disease.  6/22/2021 PET/CT - Minimal soft tissue thickening in the region of the left vaginal cuff which is non-FDG avid, unchanged since March 23, 2021 and considerably decreased as compared to CT December 24, 2020.  No evidence of FDG avid distant metastatic disease.  3/24/21 CT Chest:  SHAINA  3/23/21 MRI Pelvis:  Status post hysterectomy previously demonstrated patchy cuff lesion today measures 1.5 x 1.9 x 1.3 cm in size with peripheral enhancement and possible central necrosis. This represents a decrease in size from prior CT scan where it measured 3.0 x 3.1 cm  12/24/20 CT C/A/P:  3.1 cm solid mass adjacent to the left vaginal apex suspicious for recurrent disease.  No evidence of metastatic disease in the thorax or abdomen.  12/22/20 path:  Vaginal cuff mass, biopsy:  - Clear cell carcinoma.  The tumor cells are focally positive for Napsin, and ER, and negative for AL, supporting the above diagnosis  6/10/20 Pathology: Uterus, cervix, bilateral fallopian tubes and ovaries, total hysterectomy and bilateral salpingo-oophorectomy:  Endometrial endometrioid carcinoma (FIGO I).  Tumor is extending into the adenomyosis foci.  Leiomyomas.  Adenomyosis. Unremarkable cervix.  Unremarkable bilateral ovaries and fallopian tubes Lymph node, pelvic sentinel, right, excision: Two lymph nodes negative for tumor (0/2) ( H and E and immunostain AE1-AE3) Uterus, lower uterine segment fibroid, removal:  Leiomyoma with degenerative changes Fallopian tube,  paratubal cyst, right, removal:  Unremarkable fallopian tube with microcalcifications. Paratubal cysts Lymph node, pelvic sentinel, left, excision:  Four  lymph nodes negative for tumor (0/7)( H and E and immunostain AE1-AE3) Comment :  Final grading of the tumor is grade II in correlation with patient prior biopsy. Of note, no myometrial invasion is identified, what was seen in the original frozen section slide is best interpreted as involvement / extension of the tumor into the foci of adenomyosis rather than myometrial invasion. Synoptic Summary Procedure:  Total hysterectomy and bilateral salpingo-oophorectomy Specimen Integrity:   Intact hysterectomy Tumor Site:   Anterior endometrium and Posterior endometrium Tumor Size: 3.5  cm ( greatest dimension) Histologic Type:  Endometrioid adenocarcinoma Histologic Grade:   FIGO grade II Myometrial Invasion:   Not present Adenomyosis: present - involved by carcinoma Uterine Serosa Involvement:   Not identified Lower Uterine Segment Involvement:  Not identified Cervical Stromal Involvement:   Not involved Other Tissue/Organs involvement:   Not involved Peritoneal or Ascitic Fluid:  Please see result (63-CA-) Margins:    Not applicable Lymphovascular Invasion:   Not identified Regional Lymph Nodes: Pelvic Lymph nodes: Number of Pelvic Nodes with Macrometastasis (>2mm):  0 Number of Pelvic Nodes with Micrometastasis (>0.2 mm up to 2mm): 0 Number of Pelvic Nodes with Isolated Tumor Cells (0.2 mm or less): 0 Laterality of Pelvic Nodes with Tumor: N/A Total Number of Pelvic Nodes Examined (sentinel and non-sentinel): 6 Number of Pelvic [place] Nodes Examined: 6 Laterality of Pelvic Nodes Examined: Left and right Para-aortic Lymph nodes:  No lymph nodes submitted Pathologic Stage Classification (pTNM, AJCC 8th Edition) TNM descriptors : N/A Primary Tumor (pT):  pT1a Regional Lymph Nodes (pN) or pN (sn):   pN0 Distant Metastasis (pM):  pM0 FIGO Stage (2015 FIGO Cancer Report):  Ia Additional Pathologic Findings:    None Clinical History:   None Ancillary Studies:  (MMR IHC) Immunohistochemistry Testing (IHC) for Mismatch Repair Proteins performed on tissue block 1H shows the following result: MLH1 : Loss of nuclear expression MSH2 :  Intact nuclear expression MSH6:  Intact nuclear expression PMS2 : Loss of nuclear expression Interpretation: There is a loss of nuclear expression in MLH1 and PMS2, while MSH2 and MSH6 show intact nuclear expression. Loss of nuclear expression of MLH1 and PMS2 is supportive of DNA mismatch repair deficiency and high frequency of microsatellite instability (MSI-H).  6/10/20 Pathology:  Endocervix curettings - Fragments of endometrial adenocarcinoma morphologically similar to part 2.  Scant benign cervical tissue  Endometrial curettings - Endometrial endometrioid adenocarcinoma with focal squamous and mucinous differentiation, FIGO grade 2 (see comment) Comment: Focal areas of tumor with clear cell morphology are seen admixed with fragments of endometrioid adenocarcinoma.

## 2024-08-05 NOTE — HISTORY OF PRESENT ILLNESS
[Disease: _____________________] : Disease: [unfilled] [de-identified] : The patient was diagnosed with endometrial cancer in June 2020 at the age of 60.  She presented to GYN with postmenopausal bleeding in 12/2019. She had not seen a gynecologist in 27 years and had significant history of severe vulvodynia and resultant refusal of pelvic exams. She noted intermittent postmenopausal spotting for greater than 2 years. Occurred once in 2018, once in 2019 then returned in Jan 2020 almost every day.  2/25/20 Pelvic US:  uterus- left fundal leiomyoma measuring 3.9 x 2.5 x 4.4 cm, and another right-sided leiomyoma measures 2.3 x 2 x 2.7 cm.  3/5/2020- Pelvic MRI:   uterus-  several leiomyomata including 2 pedunculated left- sided leiomyoma measuring 3.2 x and 2.5 cm,  as well as a fundal subserosal leiomyoma measuring 2.8 cm, with an apparent pedunculated 2.5 cm.  No pelvic lymphadenopathy.  D&C 6/12/20:  grade 2 endometrial cancer with clear cell features. A robotic TLH/BSO on 6/22/20 revealed a 3.5 cm endometrioid adenocarcinoma without myometrial invasion and 4 negative pelvic lymph nodes. The pelvic washings were negative for malignant cells. MMR: loss of nuclear expression of MLH1/PMS2; methylation detected, indicative of sporadic mutation. No adjuvant therapy was indicated.  She had a postop checkup due to complaints of vaginal spotting on 12/8/2020. A 3 cm vaginal cuff lesion was noted and biopsy was performed and a friable mass was seen. Pathology showed a clear cell carcinoma.  She was deemed not a surgical candidate and is s/p EBRT with Dr. Katherine Dickerson with plans for brachytherapy as well.    [de-identified] : PMH:  She has significant history of anxiety and severe vulvodynia and resultant refusal of pelvic exams as well as sexual dysfunction.\par  PSH: lumpectomy, multiple vulvar surgeries due to vulvodynia (Bartholin gland, hymenal tissue/scar tissue removed) under care of Dr. Jefferson Downs at [place]. \par  FH:: father- mesothelioma, brother- bladder cancer, maternal grandmother with breast and colon cancer, maternal great aunt with breast, maternal second cousin with breast cancer.\par  HM:  Mammo/ SBE/ CBE- followed by Dr. Briscoe (diffuse cystic mastopathy) - [facility] 9/2020 BIRADS2\par  EGD- 10/2016- hiatal hernia\par  Colonoscopy/EGD- 10/2016- negative per patient; had f/u 8/2020 which had a benign polyp per patient \par  \par   [de-identified] : She has completed EBRT in the last week of February 2021 and tolerated it well, except for vulvar irritation. She had a PET 6/4/2021 which showed minimal soft tissue thickening in the region of the left vaginal cuff which is non-FDG avid, unchanged since March 23, 2021 and considerably decreased as compared to CT December 24, 2020.  No evidence of FDG avid distant metastatic disease.\par  \par  She has occasional spotting that stopped ~ 2-3 weeks and resumed mid March, with occasional spotting. She denies any other associated signs and symptoms. She reports chronic right hip pain.

## 2024-08-05 NOTE — ASSESSMENT
[FreeTextEntry1] : Most relapses that occur among patients without prior radiation therapy are vaginal recurrences. Data regarding treatment of isolated vaginal relapse are limited, but outcomes appear favorable based on observational studies with RT and the few studies of surgical treatment for this patient population. In the PORTEC-1 trial, among 30 women without prior RT who experienced an isolated vaginal recurrence, the complete response rate to curative-intent treatment was 87 percent. For most women (28 patients), curative-intent treatment consisted of whole-pelvic RT, with or without brachytherapy, although one of these patients also received surgery, and two were also treated with endocrine therapy.    For patients with extravaginal extension or pelvic lymph node involvement, the prognosis is worse. In PORTEC-1, only 4 of 10 patients who were treated for pelvic relapse with RT with curative intent reached complete remission.  There is an ongoing [facility] trial 238, which randomly assigns these patients to RT with or without concurrent sensitizing cisplatin but is still considered investigational.    For patients treated with curative intent for locoregional recurrence, surveillance is necessary after treatment. Per the NCCN guidelines, CT CAP every six months for the first three years and then every 6 to 12 months for the following two years is recommended.

## 2024-10-09 ENCOUNTER — APPOINTMENT (OUTPATIENT)
Dept: FAMILY MEDICINE | Facility: CLINIC | Age: 64
End: 2024-10-09
Payer: COMMERCIAL

## 2024-10-09 VITALS
HEIGHT: 67 IN | WEIGHT: 168 LBS | HEART RATE: 106 BPM | BODY MASS INDEX: 26.37 KG/M2 | TEMPERATURE: 98.5 F | OXYGEN SATURATION: 98 % | SYSTOLIC BLOOD PRESSURE: 154 MMHG | DIASTOLIC BLOOD PRESSURE: 80 MMHG

## 2024-10-09 VITALS — SYSTOLIC BLOOD PRESSURE: 132 MMHG | DIASTOLIC BLOOD PRESSURE: 86 MMHG

## 2024-10-09 DIAGNOSIS — N20.0 CALCULUS OF KIDNEY: ICD-10-CM

## 2024-10-09 DIAGNOSIS — M79.2 NEURALGIA AND NEURITIS, UNSPECIFIED: ICD-10-CM

## 2024-10-09 DIAGNOSIS — M85.80 OTHER SPECIFIED DISORDERS OF BONE DENSITY AND STRUCTURE, UNSPECIFIED SITE: ICD-10-CM

## 2024-10-09 DIAGNOSIS — Z00.00 ENCOUNTER FOR GENERAL ADULT MEDICAL EXAMINATION W/OUT ABNORMAL FINDINGS: ICD-10-CM

## 2024-10-09 DIAGNOSIS — C54.1 MALIGNANT NEOPLASM OF ENDOMETRIUM: ICD-10-CM

## 2024-10-09 DIAGNOSIS — K62.89 OTHER SPECIFIED DISEASES OF ANUS AND RECTUM: ICD-10-CM

## 2024-10-09 DIAGNOSIS — M54.50 LOW BACK PAIN, UNSPECIFIED: ICD-10-CM

## 2024-10-09 DIAGNOSIS — E04.9 NONTOXIC GOITER, UNSPECIFIED: ICD-10-CM

## 2024-10-09 LAB
BILIRUB UR QL STRIP: NEGATIVE
GLUCOSE UR-MCNC: NEGATIVE
HCG UR QL: 0.2 EU/DL
HGB UR QL STRIP.AUTO: NEGATIVE
KETONES UR-MCNC: NEGATIVE
LEUKOCYTE ESTERASE UR QL STRIP: NORMAL
NITRITE UR QL STRIP: NEGATIVE
PH UR STRIP: 6
PROT UR STRIP-MCNC: NEGATIVE
SP GR UR STRIP: 1.01

## 2024-10-09 PROCEDURE — 81003 URINALYSIS AUTO W/O SCOPE: CPT | Mod: QW

## 2024-10-09 PROCEDURE — 99214 OFFICE O/P EST MOD 30 MIN: CPT | Mod: 25

## 2024-10-10 LAB
25(OH)D3 SERPL-MCNC: 37.5 NG/ML
CHOLEST SERPL-MCNC: 182 MG/DL
ESTIMATED AVERAGE GLUCOSE: 103 MG/DL
FOLATE SERPL-MCNC: 12.5 NG/ML
HBA1C MFR BLD HPLC: 5.2 %
HDLC SERPL-MCNC: 60 MG/DL
LDLC SERPL CALC-MCNC: 110 MG/DL
NONHDLC SERPL-MCNC: 122 MG/DL
TRIGL SERPL-MCNC: 59 MG/DL
TSH SERPL-ACNC: 1.85 UIU/ML
VIT B12 SERPL-MCNC: 379 PG/ML

## 2024-11-05 ENCOUNTER — APPOINTMENT (OUTPATIENT)
Dept: GYNECOLOGIC ONCOLOGY | Facility: CLINIC | Age: 64
End: 2024-11-05
Payer: COMMERCIAL

## 2024-11-05 VITALS
HEIGHT: 67 IN | RESPIRATION RATE: 18 BRPM | DIASTOLIC BLOOD PRESSURE: 97 MMHG | HEART RATE: 107 BPM | BODY MASS INDEX: 26.21 KG/M2 | SYSTOLIC BLOOD PRESSURE: 145 MMHG | WEIGHT: 167 LBS | OXYGEN SATURATION: 96 %

## 2024-11-05 DIAGNOSIS — C54.1 MALIGNANT NEOPLASM OF ENDOMETRIUM: ICD-10-CM

## 2024-11-05 PROCEDURE — 99459 PELVIC EXAMINATION: CPT

## 2024-11-05 PROCEDURE — 99213 OFFICE O/P EST LOW 20 MIN: CPT

## 2024-11-19 ENCOUNTER — NON-APPOINTMENT (OUTPATIENT)
Age: 64
End: 2024-11-19

## 2024-11-21 ENCOUNTER — APPOINTMENT (OUTPATIENT)
Dept: COLORECTAL SURGERY | Facility: CLINIC | Age: 64
End: 2024-11-21
Payer: COMMERCIAL

## 2024-11-21 VITALS
BODY MASS INDEX: 26.21 KG/M2 | HEIGHT: 67 IN | RESPIRATION RATE: 14 BRPM | HEART RATE: 92 BPM | DIASTOLIC BLOOD PRESSURE: 93 MMHG | WEIGHT: 167 LBS | SYSTOLIC BLOOD PRESSURE: 147 MMHG

## 2024-11-21 DIAGNOSIS — L29.0 PRURITUS ANI: ICD-10-CM

## 2024-11-21 DIAGNOSIS — K64.8 OTHER HEMORRHOIDS: ICD-10-CM

## 2024-11-21 PROCEDURE — 46600 DIAGNOSTIC ANOSCOPY SPX: CPT

## 2024-11-21 PROCEDURE — 99204 OFFICE O/P NEW MOD 45 MIN: CPT | Mod: 25

## 2024-11-21 RX ORDER — HYDROCORTISONE 25 MG/G
2.5 CREAM TOPICAL 3 TIMES DAILY
Qty: 1 | Refills: 3 | Status: ACTIVE | COMMUNITY
Start: 2024-11-21 | End: 1900-01-01

## 2024-12-07 ENCOUNTER — APPOINTMENT (OUTPATIENT)
Dept: ULTRASOUND IMAGING | Facility: CLINIC | Age: 64
End: 2024-12-07
Payer: COMMERCIAL

## 2024-12-07 ENCOUNTER — OUTPATIENT (OUTPATIENT)
Dept: OUTPATIENT SERVICES | Facility: HOSPITAL | Age: 64
LOS: 1 days | End: 2024-12-07
Payer: COMMERCIAL

## 2024-12-07 DIAGNOSIS — N20.0 CALCULUS OF KIDNEY: ICD-10-CM

## 2024-12-07 DIAGNOSIS — E04.9 NONTOXIC GOITER, UNSPECIFIED: ICD-10-CM

## 2024-12-07 DIAGNOSIS — M85.80 OTHER SPECIFIED DISORDERS OF BONE DENSITY AND STRUCTURE, UNSPECIFIED SITE: ICD-10-CM

## 2024-12-07 DIAGNOSIS — Z98.890 OTHER SPECIFIED POSTPROCEDURAL STATES: Chronic | ICD-10-CM

## 2024-12-07 DIAGNOSIS — N90.7 VULVAR CYST: Chronic | ICD-10-CM

## 2024-12-07 DIAGNOSIS — Z00.8 ENCOUNTER FOR OTHER GENERAL EXAMINATION: ICD-10-CM

## 2024-12-07 PROCEDURE — 76536 US EXAM OF HEAD AND NECK: CPT

## 2024-12-07 PROCEDURE — 76536 US EXAM OF HEAD AND NECK: CPT | Mod: 26

## 2024-12-07 PROCEDURE — 76775 US EXAM ABDO BACK WALL LIM: CPT

## 2024-12-07 PROCEDURE — 76775 US EXAM ABDO BACK WALL LIM: CPT | Mod: 26

## 2024-12-13 ENCOUNTER — OFFICE (OUTPATIENT)
Dept: URBAN - METROPOLITAN AREA CLINIC 88 | Facility: CLINIC | Age: 64
Setting detail: OPHTHALMOLOGY
End: 2024-12-13
Payer: COMMERCIAL

## 2024-12-13 DIAGNOSIS — H43.813: ICD-10-CM

## 2024-12-13 DIAGNOSIS — H35.3131: ICD-10-CM

## 2024-12-13 DIAGNOSIS — H35.371: ICD-10-CM

## 2024-12-13 PROCEDURE — 92134 CPTRZ OPH DX IMG PST SGM RTA: CPT | Performed by: OPHTHALMOLOGY

## 2024-12-13 PROCEDURE — 92014 COMPRE OPH EXAM EST PT 1/>: CPT | Performed by: OPHTHALMOLOGY

## 2024-12-13 ASSESSMENT — TONOMETRY
OS_IOP_MMHG: 16
OD_IOP_MMHG: 15

## 2024-12-13 ASSESSMENT — SUPERFICIAL PUNCTATE KERATITIS (SPK)
OD_SPK: 1+
OS_SPK: 1+

## 2024-12-13 ASSESSMENT — KERATOMETRY
OS_AXISANGLE_DEGREES: 090
OS_K1POWER_DIOPTERS: 43.50
OD_AXISANGLE_DEGREES: 090
OD_K2POWER_DIOPTERS: 43.00
OS_K2POWER_DIOPTERS: 43.50
OD_K1POWER_DIOPTERS: 43.00
METHOD_AUTO_MANUAL: AUTO

## 2024-12-13 ASSESSMENT — REFRACTION_AUTOREFRACTION
OS_SPHERE: +0.75
OS_AXIS: 079
OD_SPHERE: +0.50
OS_CYLINDER: -0.75
OD_AXIS: 105
OD_CYLINDER: -0.25

## 2024-12-13 ASSESSMENT — LID EXAM ASSESSMENTS
OD_BLEPHARITIS: RUL 1+
OS_BLEPHARITIS: LUL 1+

## 2024-12-13 ASSESSMENT — VISUAL ACUITY
OS_BCVA: 20/20
OD_BCVA: 20/20

## 2024-12-13 ASSESSMENT — CONFRONTATIONAL VISUAL FIELD TEST (CVF)
OD_FINDINGS: FULL
OS_FINDINGS: FULL

## 2024-12-30 NOTE — H&P PST ADULT - NEGATIVE RESPIRATORY AND THORAX SYMPTOMS
Subjective   Patient ID: Carine Rowell is a 40 y.o. female who presents for vaginal discomfort (Reports left labia swollen almost a week, no discharge).  HPI  Thought she pulled a muscle Thursday    Feels labia was swollen as of Friday night- painful to the touch - today- it is unchanged-   Has tried warm compress in the moment  Ibuprofen and tylenol without relief    Some recent intercourse- has vaginal dryness uses lube-   Wax two weeks prior     Review of Systems   Genitourinary:  Positive for genital sores and pelvic pain. Negative for vaginal discharge.       Objective   Physical Exam  Constitutional:       Appearance: Normal appearance. She is normal weight.   Pulmonary:      Effort: Pulmonary effort is normal.   Genitourinary:     Labia:         Right: Tenderness present. No rash, lesion or injury.         Left: No rash, tenderness, lesion or injury.           Comments: Small palpable 1-2cm hard round marble like mass tenderness to palpation- bottom of right labia swollen, soft, mildly edematous- no erythema or discharge present   Psychiatric:         Mood and Affect: Mood normal.         Behavior: Behavior normal.         Thought Content: Thought content normal.         Judgment: Judgment normal.         Assessment/Plan   Problem List Items Addressed This Visit             ICD-10-CM       Ob-Gyn Problems    Labial swelling - Primary N94.89    Relevant Medications    sulfamethoxazole-trimethoprim (Bactrim DS) 800-160 mg tablet     Other Visit Diagnoses         Codes    Bartholin's cyst     N75.0    Relevant Medications    sulfamethoxazole-trimethoprim (Bactrim DS) 800-160 mg tablet          Dr. Vicente to assess - warm soaks and short course of antibiotics- if symptoms worsen to return to discuss drainage if size increases          BHARGAVI Nino 12/30/24 10:41 AM    no wheezing/no cough/no dyspnea

## 2025-01-02 ENCOUNTER — OUTPATIENT (OUTPATIENT)
Dept: OUTPATIENT SERVICES | Facility: HOSPITAL | Age: 65
LOS: 1 days | End: 2025-01-02
Payer: COMMERCIAL

## 2025-01-02 ENCOUNTER — APPOINTMENT (OUTPATIENT)
Dept: CT IMAGING | Facility: CLINIC | Age: 65
End: 2025-01-02
Payer: COMMERCIAL

## 2025-01-02 DIAGNOSIS — Z00.8 ENCOUNTER FOR OTHER GENERAL EXAMINATION: ICD-10-CM

## 2025-01-02 PROCEDURE — 74177 CT ABD & PELVIS W/CONTRAST: CPT | Mod: 26

## 2025-01-02 PROCEDURE — 74177 CT ABD & PELVIS W/CONTRAST: CPT

## 2025-01-02 PROCEDURE — 71260 CT THORAX DX C+: CPT | Mod: 26

## 2025-01-02 PROCEDURE — 71260 CT THORAX DX C+: CPT

## 2025-01-03 ENCOUNTER — OUTPATIENT (OUTPATIENT)
Dept: OUTPATIENT SERVICES | Facility: HOSPITAL | Age: 65
LOS: 1 days | Discharge: HOME | End: 2025-01-03

## 2025-01-03 ENCOUNTER — LABORATORY RESULT (OUTPATIENT)
Age: 65
End: 2025-01-03

## 2025-01-03 DIAGNOSIS — N90.7 VULVAR CYST: Chronic | ICD-10-CM

## 2025-01-03 DIAGNOSIS — C54.1 MALIGNANT NEOPLASM OF ENDOMETRIUM: ICD-10-CM

## 2025-01-03 DIAGNOSIS — Z98.890 OTHER SPECIFIED POSTPROCEDURAL STATES: Chronic | ICD-10-CM

## 2025-01-06 ENCOUNTER — NON-APPOINTMENT (OUTPATIENT)
Age: 65
End: 2025-01-06

## 2025-01-06 ENCOUNTER — APPOINTMENT (OUTPATIENT)
Dept: HEMATOLOGY ONCOLOGY | Facility: CLINIC | Age: 65
End: 2025-01-06
Payer: COMMERCIAL

## 2025-01-06 VITALS
HEIGHT: 67 IN | TEMPERATURE: 98.1 F | DIASTOLIC BLOOD PRESSURE: 83 MMHG | SYSTOLIC BLOOD PRESSURE: 140 MMHG | WEIGHT: 171 LBS | BODY MASS INDEX: 26.84 KG/M2 | HEART RATE: 79 BPM | OXYGEN SATURATION: 97 %

## 2025-01-06 DIAGNOSIS — C54.1 MALIGNANT NEOPLASM OF ENDOMETRIUM: ICD-10-CM

## 2025-01-06 LAB
ALBUMIN SERPL ELPH-MCNC: 4.2 G/DL
ALP BLD-CCNC: 87 U/L
ALT SERPL-CCNC: 44 U/L
ANION GAP SERPL CALC-SCNC: 12 MMOL/L
AST SERPL-CCNC: 28 U/L
BILIRUB SERPL-MCNC: 0.5 MG/DL
BUN SERPL-MCNC: 18 MG/DL
CALCIUM SERPL-MCNC: 9.7 MG/DL
CANCER AG125 SERPL-ACNC: 8 U/ML
CHLORIDE SERPL-SCNC: 102 MMOL/L
CO2 SERPL-SCNC: 27 MMOL/L
CREAT SERPL-MCNC: 0.72 MG/DL
EGFR: 94 ML/MIN/1.73M2
GLUCOSE SERPL-MCNC: 81 MG/DL
MAGNESIUM SERPL-MCNC: 2.2 MG/DL
POTASSIUM SERPL-SCNC: 4.8 MMOL/L
PROT SERPL-MCNC: 6.5 G/DL
SODIUM SERPL-SCNC: 141 MMOL/L

## 2025-01-06 PROCEDURE — 99215 OFFICE O/P EST HI 40 MIN: CPT

## 2025-01-06 PROCEDURE — G2211 COMPLEX E/M VISIT ADD ON: CPT

## 2025-01-10 ENCOUNTER — APPOINTMENT (OUTPATIENT)
Dept: BREAST CENTER | Facility: CLINIC | Age: 65
End: 2025-01-10
Payer: COMMERCIAL

## 2025-01-10 VITALS
DIASTOLIC BLOOD PRESSURE: 82 MMHG | BODY MASS INDEX: 26.68 KG/M2 | SYSTOLIC BLOOD PRESSURE: 129 MMHG | WEIGHT: 170 LBS | HEART RATE: 74 BPM | OXYGEN SATURATION: 96 % | HEIGHT: 67 IN

## 2025-01-10 DIAGNOSIS — Z12.39 ENCOUNTER FOR OTHER SCREENING FOR MALIGNANT NEOPLASM OF BREAST: ICD-10-CM

## 2025-01-10 DIAGNOSIS — Z80.3 FAMILY HISTORY OF MALIGNANT NEOPLASM OF BREAST: ICD-10-CM

## 2025-01-10 DIAGNOSIS — R92.30 DENSE BREASTS, UNSPECIFIED: ICD-10-CM

## 2025-01-10 PROCEDURE — 99214 OFFICE O/P EST MOD 30 MIN: CPT

## 2025-02-18 ENCOUNTER — APPOINTMENT (OUTPATIENT)
Dept: FAMILY MEDICINE | Facility: CLINIC | Age: 65
End: 2025-02-18
Payer: COMMERCIAL

## 2025-02-18 VITALS — DIASTOLIC BLOOD PRESSURE: 80 MMHG | RESPIRATION RATE: 16 BRPM | SYSTOLIC BLOOD PRESSURE: 126 MMHG | HEART RATE: 88 BPM

## 2025-02-18 VITALS
HEIGHT: 67 IN | HEART RATE: 108 BPM | BODY MASS INDEX: 25.74 KG/M2 | DIASTOLIC BLOOD PRESSURE: 88 MMHG | TEMPERATURE: 97.6 F | WEIGHT: 164 LBS | OXYGEN SATURATION: 98 % | SYSTOLIC BLOOD PRESSURE: 124 MMHG

## 2025-02-18 DIAGNOSIS — R05.9 COUGH, UNSPECIFIED: ICD-10-CM

## 2025-02-18 LAB
BILIRUB UR QL STRIP: NORMAL
GLUCOSE UR-MCNC: NEGATIVE
HCG UR QL: NORMAL EU/DL
HGB UR QL STRIP.AUTO: NEGATIVE
KETONES UR-MCNC: NORMAL
LEUKOCYTE ESTERASE UR QL STRIP: NORMAL
NITRITE UR QL STRIP: NEGATIVE
PH UR STRIP: 6
PROT UR STRIP-MCNC: NEGATIVE
SP GR UR STRIP: 1.02

## 2025-02-18 PROCEDURE — 99214 OFFICE O/P EST MOD 30 MIN: CPT | Mod: 25

## 2025-02-18 PROCEDURE — 81003 URINALYSIS AUTO W/O SCOPE: CPT | Mod: QW

## 2025-02-19 LAB
APPEARANCE: CLEAR
BACTERIA: NEGATIVE /HPF
BILIRUBIN URINE: NEGATIVE
BLOOD URINE: NEGATIVE
CAST: 0 /LPF
COLOR: YELLOW
EPITHELIAL CELLS: 1 /HPF
GLUCOSE QUALITATIVE U: NEGATIVE MG/DL
KETONES URINE: NEGATIVE MG/DL
LEUKOCYTE ESTERASE URINE: ABNORMAL
MICROSCOPIC-UA: NORMAL
NITRITE URINE: NEGATIVE
PH URINE: 6.5
PROTEIN URINE: NEGATIVE MG/DL
RED BLOOD CELLS URINE: 0 /HPF
SPECIFIC GRAVITY URINE: 1.01
UROBILINOGEN URINE: 0.2 MG/DL
WHITE BLOOD CELLS URINE: 2 /HPF

## 2025-02-21 LAB — BACTERIA UR CULT: ABNORMAL

## 2025-02-21 RX ORDER — CIPROFLOXACIN HYDROCHLORIDE 250 MG/1
250 TABLET, FILM COATED ORAL
Qty: 10 | Refills: 0 | Status: ACTIVE | COMMUNITY
Start: 2025-02-21 | End: 1900-01-01

## 2025-02-25 ENCOUNTER — APPOINTMENT (OUTPATIENT)
Dept: GYNECOLOGIC ONCOLOGY | Facility: CLINIC | Age: 65
End: 2025-02-25
Payer: COMMERCIAL

## 2025-02-25 VITALS
OXYGEN SATURATION: 95 % | HEIGHT: 67 IN | DIASTOLIC BLOOD PRESSURE: 98 MMHG | BODY MASS INDEX: 26.21 KG/M2 | SYSTOLIC BLOOD PRESSURE: 147 MMHG | WEIGHT: 167 LBS | HEART RATE: 84 BPM

## 2025-02-25 DIAGNOSIS — Z87.898 PERSONAL HISTORY OF OTHER SPECIFIED CONDITIONS: ICD-10-CM

## 2025-02-25 PROBLEM — R31.9 HEMATURIA, UNSPECIFIED TYPE: Status: ACTIVE | Noted: 2025-02-18

## 2025-02-25 PROCEDURE — 99459 PELVIC EXAMINATION: CPT

## 2025-02-25 PROCEDURE — 99213 OFFICE O/P EST LOW 20 MIN: CPT

## 2025-02-25 RX ORDER — BENZONATATE 200 MG/1
200 CAPSULE ORAL 3 TIMES DAILY
Qty: 30 | Refills: 1 | Status: DISCONTINUED | COMMUNITY
Start: 2025-02-18 | End: 2025-02-25

## 2025-02-27 ENCOUNTER — NON-APPOINTMENT (OUTPATIENT)
Age: 65
End: 2025-02-27

## 2025-03-05 ENCOUNTER — APPOINTMENT (OUTPATIENT)
Dept: FAMILY MEDICINE | Facility: CLINIC | Age: 65
End: 2025-03-05
Payer: COMMERCIAL

## 2025-03-05 VITALS
HEART RATE: 99 BPM | TEMPERATURE: 98.2 F | SYSTOLIC BLOOD PRESSURE: 128 MMHG | DIASTOLIC BLOOD PRESSURE: 80 MMHG | BODY MASS INDEX: 26.37 KG/M2 | HEIGHT: 67 IN | OXYGEN SATURATION: 98 % | WEIGHT: 168 LBS

## 2025-03-05 DIAGNOSIS — M85.80 OTHER SPECIFIED DISORDERS OF BONE DENSITY AND STRUCTURE, UNSPECIFIED SITE: ICD-10-CM

## 2025-03-05 LAB
BILIRUB UR QL STRIP: NEGATIVE
GLUCOSE UR-MCNC: NEGATIVE
HCG UR QL: 0.2 EU/DL
HGB UR QL STRIP.AUTO: NEGATIVE
KETONES UR-MCNC: NEGATIVE
LEUKOCYTE ESTERASE UR QL STRIP: NEGATIVE
NITRITE UR QL STRIP: NEGATIVE
PH UR STRIP: 5.5
PROT UR STRIP-MCNC: NEGATIVE
SP GR UR STRIP: 1

## 2025-03-05 PROCEDURE — 81003 URINALYSIS AUTO W/O SCOPE: CPT | Mod: QW

## 2025-03-05 PROCEDURE — 99214 OFFICE O/P EST MOD 30 MIN: CPT | Mod: 25

## 2025-03-06 LAB
APPEARANCE: CLEAR
BILIRUBIN URINE: NEGATIVE
BLOOD URINE: NEGATIVE
COLOR: YELLOW
GLUCOSE QUALITATIVE U: NEGATIVE MG/DL
KETONES URINE: NEGATIVE MG/DL
LEUKOCYTE ESTERASE URINE: NEGATIVE
NITRITE URINE: NEGATIVE
PH URINE: 6
PROTEIN URINE: NEGATIVE MG/DL
SPECIFIC GRAVITY URINE: 1.01
UROBILINOGEN URINE: 0.2 MG/DL

## 2025-03-10 ENCOUNTER — APPOINTMENT (OUTPATIENT)
Dept: FAMILY MEDICINE | Facility: CLINIC | Age: 65
End: 2025-03-10
Payer: COMMERCIAL

## 2025-03-10 ENCOUNTER — NON-APPOINTMENT (OUTPATIENT)
Age: 65
End: 2025-03-10

## 2025-03-10 VITALS
BODY MASS INDEX: 26.24 KG/M2 | SYSTOLIC BLOOD PRESSURE: 140 MMHG | TEMPERATURE: 97.9 F | HEART RATE: 108 BPM | OXYGEN SATURATION: 97 % | DIASTOLIC BLOOD PRESSURE: 90 MMHG | HEIGHT: 67 IN | WEIGHT: 167.19 LBS

## 2025-03-10 DIAGNOSIS — R31.9 HEMATURIA, UNSPECIFIED: ICD-10-CM

## 2025-03-10 LAB
BILIRUB UR QL STRIP: NEGATIVE
CLARITY UR: CLEAR
COLLECTION METHOD: NORMAL
GLUCOSE UR-MCNC: NEGATIVE
HCG UR QL: 0.2 EU/DL
HGB UR QL STRIP.AUTO: NEGATIVE
KETONES UR-MCNC: NEGATIVE
LEUKOCYTE ESTERASE UR QL STRIP: NORMAL
NITRITE UR QL STRIP: NEGATIVE
PH UR STRIP: 7
PROT UR STRIP-MCNC: NEGATIVE
SP GR UR STRIP: 1.01

## 2025-03-10 PROCEDURE — 99213 OFFICE O/P EST LOW 20 MIN: CPT | Mod: 25

## 2025-03-10 PROCEDURE — 81003 URINALYSIS AUTO W/O SCOPE: CPT | Mod: QW

## 2025-03-11 ENCOUNTER — APPOINTMENT (OUTPATIENT)
Dept: GYNECOLOGIC ONCOLOGY | Facility: CLINIC | Age: 65
End: 2025-03-11
Payer: COMMERCIAL

## 2025-03-11 VITALS
SYSTOLIC BLOOD PRESSURE: 135 MMHG | BODY MASS INDEX: 25.9 KG/M2 | HEIGHT: 67 IN | WEIGHT: 165 LBS | HEART RATE: 96 BPM | DIASTOLIC BLOOD PRESSURE: 89 MMHG | RESPIRATION RATE: 16 BRPM | OXYGEN SATURATION: 97 %

## 2025-03-11 DIAGNOSIS — C54.1 MALIGNANT NEOPLASM OF ENDOMETRIUM: ICD-10-CM

## 2025-03-11 PROCEDURE — 99213 OFFICE O/P EST LOW 20 MIN: CPT

## 2025-03-11 PROCEDURE — 99459 PELVIC EXAMINATION: CPT

## 2025-03-12 ENCOUNTER — APPOINTMENT (OUTPATIENT)
Dept: COLORECTAL SURGERY | Facility: CLINIC | Age: 65
End: 2025-03-12
Payer: COMMERCIAL

## 2025-03-12 VITALS
OXYGEN SATURATION: 98 % | DIASTOLIC BLOOD PRESSURE: 82 MMHG | TEMPERATURE: 97.2 F | BODY MASS INDEX: 26.21 KG/M2 | WEIGHT: 167 LBS | HEART RATE: 81 BPM | SYSTOLIC BLOOD PRESSURE: 129 MMHG | HEIGHT: 67 IN

## 2025-03-12 DIAGNOSIS — K62.5 HEMORRHAGE OF ANUS AND RECTUM: ICD-10-CM

## 2025-03-12 LAB — BACTERIA UR CULT: NORMAL

## 2025-03-12 PROCEDURE — 99214 OFFICE O/P EST MOD 30 MIN: CPT | Mod: 25

## 2025-03-12 PROCEDURE — 46600 DIAGNOSTIC ANOSCOPY SPX: CPT

## 2025-03-13 ENCOUNTER — TRANSCRIPTION ENCOUNTER (OUTPATIENT)
Age: 65
End: 2025-03-13

## 2025-03-19 NOTE — PHYSICAL EXAM
never used [Normal] : normoactive bowel sounds, soft and nontender, no hepatosplenomegaly or masses appreciated [Normal] : normal external genitalia

## 2025-03-31 ENCOUNTER — NON-APPOINTMENT (OUTPATIENT)
Age: 65
End: 2025-03-31

## 2025-03-31 ENCOUNTER — APPOINTMENT (OUTPATIENT)
Dept: UROLOGY | Facility: CLINIC | Age: 65
End: 2025-03-31
Payer: COMMERCIAL

## 2025-03-31 DIAGNOSIS — R31.0 GROSS HEMATURIA: ICD-10-CM

## 2025-03-31 PROCEDURE — 99203 OFFICE O/P NEW LOW 30 MIN: CPT

## 2025-04-01 LAB
APPEARANCE: CLEAR
BACTERIA: NEGATIVE /HPF
BILIRUBIN URINE: NEGATIVE
BLOOD URINE: NEGATIVE
CAST: 0 /LPF
COLOR: YELLOW
EPITHELIAL CELLS: 0 /HPF
GLUCOSE QUALITATIVE U: NEGATIVE MG/DL
KETONES URINE: NEGATIVE MG/DL
LEUKOCYTE ESTERASE URINE: NEGATIVE
MICROSCOPIC-UA: NORMAL
NITRITE URINE: NEGATIVE
PH URINE: 7
PROTEIN URINE: NEGATIVE MG/DL
RED BLOOD CELLS URINE: 0 /HPF
SPECIFIC GRAVITY URINE: 1
UROBILINOGEN URINE: 0.2 MG/DL
WHITE BLOOD CELLS URINE: 0 /HPF

## 2025-04-02 LAB — BACTERIA UR CULT: NORMAL

## 2025-04-07 ENCOUNTER — APPOINTMENT (OUTPATIENT)
Dept: UROLOGY | Facility: CLINIC | Age: 65
End: 2025-04-07
Payer: COMMERCIAL

## 2025-04-07 VITALS
HEIGHT: 67 IN | OXYGEN SATURATION: 98 % | DIASTOLIC BLOOD PRESSURE: 88 MMHG | HEART RATE: 103 BPM | SYSTOLIC BLOOD PRESSURE: 130 MMHG | BODY MASS INDEX: 26.21 KG/M2 | WEIGHT: 167 LBS | RESPIRATION RATE: 14 BRPM

## 2025-04-07 DIAGNOSIS — R31.29 OTHER MICROSCOPIC HEMATURIA: ICD-10-CM

## 2025-04-07 PROCEDURE — 52000 CYSTOURETHROSCOPY: CPT

## 2025-05-06 ENCOUNTER — APPOINTMENT (OUTPATIENT)
Dept: GYNECOLOGIC ONCOLOGY | Facility: CLINIC | Age: 65
End: 2025-05-06

## 2025-06-25 ENCOUNTER — OFFICE (OUTPATIENT)
Dept: URBAN - METROPOLITAN AREA CLINIC 12 | Facility: CLINIC | Age: 65
Setting detail: OPHTHALMOLOGY
End: 2025-06-25
Payer: COMMERCIAL

## 2025-06-25 DIAGNOSIS — H01.001: ICD-10-CM

## 2025-06-25 DIAGNOSIS — H35.3131: ICD-10-CM

## 2025-06-25 DIAGNOSIS — H43.813: ICD-10-CM

## 2025-06-25 DIAGNOSIS — H16.223: ICD-10-CM

## 2025-06-25 PROCEDURE — 92014 COMPRE OPH EXAM EST PT 1/>: CPT | Performed by: OPHTHALMOLOGY

## 2025-06-25 PROCEDURE — 92250 FUNDUS PHOTOGRAPHY W/I&R: CPT | Performed by: OPHTHALMOLOGY

## 2025-06-25 ASSESSMENT — LID EXAM ASSESSMENTS
OS_BLEPHARITIS: LUL 1+
OD_BLEPHARITIS: RUL 1+

## 2025-06-25 ASSESSMENT — TONOMETRY
OS_IOP_MMHG: 15
OD_IOP_MMHG: 16

## 2025-06-25 ASSESSMENT — KERATOMETRY
OD_K1POWER_DIOPTERS: 42.75
OD_K2POWER_DIOPTERS: 43.00
OS_AXISANGLE_DEGREES: 016
OS_K1POWER_DIOPTERS: 43.00
METHOD_AUTO_MANUAL: AUTO
OS_K2POWER_DIOPTERS: 43.50
OD_AXISANGLE_DEGREES: 180

## 2025-06-25 ASSESSMENT — REFRACTION_MANIFEST
OD_AXIS: 094
OD_VA1: 20/NI
OS_CYLINDER: -0.75
OS_SPHERE: +1.00
OS_AXIS: 085
OS_VA1: 20/NI
OD_CYLINDER: -0.50
OD_SPHERE: +0.75

## 2025-06-25 ASSESSMENT — VISUAL ACUITY
OS_BCVA: 20/20
OD_BCVA: 20/20-3

## 2025-06-25 ASSESSMENT — REFRACTION_CURRENTRX
OD_ADD: +2.50
OS_ADD: +2.50
OD_OVR_VA: 20/
OS_OVR_VA: 20/

## 2025-06-25 ASSESSMENT — REFRACTION_AUTOREFRACTION
OD_SPHERE: +0.75
OS_CYLINDER: -0.75
OD_CYLINDER: -0.50
OS_SPHERE: +1.00
OS_AXIS: 085
OD_AXIS: 094

## 2025-06-25 ASSESSMENT — CONFRONTATIONAL VISUAL FIELD TEST (CVF)
OS_FINDINGS: FULL
OD_FINDINGS: FULL

## 2025-06-25 ASSESSMENT — SUPERFICIAL PUNCTATE KERATITIS (SPK)
OD_SPK: 1+
OS_SPK: 1+

## 2025-06-26 ENCOUNTER — OUTPATIENT (OUTPATIENT)
Dept: OUTPATIENT SERVICES | Facility: HOSPITAL | Age: 65
LOS: 1 days | End: 2025-06-26
Payer: COMMERCIAL

## 2025-06-26 ENCOUNTER — APPOINTMENT (OUTPATIENT)
Dept: ULTRASOUND IMAGING | Facility: CLINIC | Age: 65
End: 2025-06-26
Payer: COMMERCIAL

## 2025-06-26 ENCOUNTER — APPOINTMENT (OUTPATIENT)
Dept: FAMILY MEDICINE | Facility: CLINIC | Age: 65
End: 2025-06-26
Payer: COMMERCIAL

## 2025-06-26 VITALS
OXYGEN SATURATION: 98 % | HEART RATE: 90 BPM | DIASTOLIC BLOOD PRESSURE: 88 MMHG | SYSTOLIC BLOOD PRESSURE: 134 MMHG | WEIGHT: 175 LBS | TEMPERATURE: 97.2 F | HEIGHT: 67 IN | BODY MASS INDEX: 27.47 KG/M2

## 2025-06-26 VITALS — HEART RATE: 76 BPM | DIASTOLIC BLOOD PRESSURE: 88 MMHG | SYSTOLIC BLOOD PRESSURE: 130 MMHG | RESPIRATION RATE: 16 BRPM

## 2025-06-26 DIAGNOSIS — Z98.890 OTHER SPECIFIED POSTPROCEDURAL STATES: Chronic | ICD-10-CM

## 2025-06-26 DIAGNOSIS — N90.7 VULVAR CYST: Chronic | ICD-10-CM

## 2025-06-26 DIAGNOSIS — I87.2 VENOUS INSUFFICIENCY (CHRONIC) (PERIPHERAL): ICD-10-CM

## 2025-06-26 PROCEDURE — 99214 OFFICE O/P EST MOD 30 MIN: CPT

## 2025-06-26 PROCEDURE — 93970 EXTREMITY STUDY: CPT

## 2025-06-26 PROCEDURE — 93970 EXTREMITY STUDY: CPT | Mod: 26

## 2025-06-26 RX ORDER — TRIAMTERENE AND HYDROCHLOROTHIAZIDE 37.5; 25 MG/1; MG/1
37.5-25 CAPSULE ORAL
Qty: 30 | Refills: 1 | Status: ACTIVE | COMMUNITY
Start: 2025-06-26 | End: 1900-01-01

## 2025-08-05 ENCOUNTER — OUTPATIENT (OUTPATIENT)
Dept: OUTPATIENT SERVICES | Facility: HOSPITAL | Age: 65
LOS: 1 days | End: 2025-08-05
Payer: COMMERCIAL

## 2025-08-05 ENCOUNTER — APPOINTMENT (OUTPATIENT)
Dept: CT IMAGING | Facility: CLINIC | Age: 65
End: 2025-08-05
Payer: COMMERCIAL

## 2025-08-05 DIAGNOSIS — Z00.8 ENCOUNTER FOR OTHER GENERAL EXAMINATION: ICD-10-CM

## 2025-08-05 DIAGNOSIS — C54.1 MALIGNANT NEOPLASM OF ENDOMETRIUM: ICD-10-CM

## 2025-08-05 DIAGNOSIS — Z98.890 OTHER SPECIFIED POSTPROCEDURAL STATES: Chronic | ICD-10-CM

## 2025-08-05 DIAGNOSIS — N90.7 VULVAR CYST: Chronic | ICD-10-CM

## 2025-08-05 PROCEDURE — 74177 CT ABD & PELVIS W/CONTRAST: CPT

## 2025-08-05 PROCEDURE — 74177 CT ABD & PELVIS W/CONTRAST: CPT | Mod: 26

## 2025-08-08 LAB
ALBUMIN SERPL ELPH-MCNC: 4.2 G/DL
ALP BLD-CCNC: 74 U/L
ALT SERPL-CCNC: 25 U/L
ANION GAP SERPL CALC-SCNC: 11 MMOL/L
AST SERPL-CCNC: 26 U/L
BASOPHILS # BLD AUTO: 0.07 K/UL
BASOPHILS NFR BLD AUTO: 1.2 %
BILIRUB SERPL-MCNC: 0.6 MG/DL
BUN SERPL-MCNC: 12 MG/DL
CALCIUM SERPL-MCNC: 9.4 MG/DL
CANCER AG125 SERPL-ACNC: 8 U/ML
CHLORIDE SERPL-SCNC: 104 MMOL/L
CO2 SERPL-SCNC: 24 MMOL/L
CREAT SERPL-MCNC: 0.72 MG/DL
EGFRCR SERPLBLD CKD-EPI 2021: 93 ML/MIN/1.73M2
EOSINOPHIL # BLD AUTO: 0.11 K/UL
EOSINOPHIL NFR BLD AUTO: 1.8 %
GLUCOSE SERPL-MCNC: 80 MG/DL
HCT VFR BLD CALC: 44.7 %
HGB BLD-MCNC: 14.4 G/DL
IMM GRANULOCYTES NFR BLD AUTO: 0.3 %
LYMPHOCYTES # BLD AUTO: 1.4 K/UL
LYMPHOCYTES NFR BLD AUTO: 23.5 %
MAGNESIUM SERPL-MCNC: 2.2 MG/DL
MAN DIFF?: NORMAL
MCHC RBC-ENTMCNC: 31 PG
MCHC RBC-ENTMCNC: 32.2 G/DL
MCV RBC AUTO: 96.1 FL
MONOCYTES # BLD AUTO: 0.53 K/UL
MONOCYTES NFR BLD AUTO: 8.9 %
NEUTROPHILS # BLD AUTO: 3.84 K/UL
NEUTROPHILS NFR BLD AUTO: 64.3 %
PLATELET # BLD AUTO: 383 K/UL
POTASSIUM SERPL-SCNC: 4.9 MMOL/L
PROT SERPL-MCNC: 6.5 G/DL
RBC # BLD: 4.65 M/UL
RBC # FLD: 13.2 %
SODIUM SERPL-SCNC: 139 MMOL/L
WBC # FLD AUTO: 5.97 K/UL

## 2025-08-11 ENCOUNTER — APPOINTMENT (OUTPATIENT)
Dept: HEMATOLOGY ONCOLOGY | Facility: CLINIC | Age: 65
End: 2025-08-11
Payer: COMMERCIAL

## 2025-08-11 VITALS
WEIGHT: 175.5 LBS | DIASTOLIC BLOOD PRESSURE: 88 MMHG | RESPIRATION RATE: 16 BRPM | OXYGEN SATURATION: 95 % | BODY MASS INDEX: 27.55 KG/M2 | HEART RATE: 104 BPM | TEMPERATURE: 99.3 F | SYSTOLIC BLOOD PRESSURE: 144 MMHG | HEIGHT: 67 IN

## 2025-08-11 PROCEDURE — 99215 OFFICE O/P EST HI 40 MIN: CPT

## 2025-08-11 PROCEDURE — G2211 COMPLEX E/M VISIT ADD ON: CPT | Mod: NC

## 2025-08-19 ENCOUNTER — APPOINTMENT (OUTPATIENT)
Dept: GYNECOLOGIC ONCOLOGY | Facility: CLINIC | Age: 65
End: 2025-08-19
Payer: COMMERCIAL

## 2025-08-19 VITALS
BODY MASS INDEX: 27.15 KG/M2 | RESPIRATION RATE: 16 BRPM | HEIGHT: 67 IN | HEART RATE: 81 BPM | OXYGEN SATURATION: 98 % | DIASTOLIC BLOOD PRESSURE: 88 MMHG | WEIGHT: 173 LBS | SYSTOLIC BLOOD PRESSURE: 157 MMHG

## 2025-08-19 DIAGNOSIS — C54.1 MALIGNANT NEOPLASM OF ENDOMETRIUM: ICD-10-CM

## 2025-08-19 PROCEDURE — 99459 PELVIC EXAMINATION: CPT

## 2025-08-19 PROCEDURE — 99213 OFFICE O/P EST LOW 20 MIN: CPT
